# Patient Record
Sex: MALE | Race: WHITE | NOT HISPANIC OR LATINO | Employment: FULL TIME | URBAN - METROPOLITAN AREA
[De-identification: names, ages, dates, MRNs, and addresses within clinical notes are randomized per-mention and may not be internally consistent; named-entity substitution may affect disease eponyms.]

---

## 2017-05-02 DIAGNOSIS — N52.9 ERECTILE DYSFUNCTION, UNSPECIFIED ERECTILE DYSFUNCTION TYPE: ICD-10-CM

## 2017-05-02 DIAGNOSIS — E22.0 ACROMEGALY: ICD-10-CM

## 2017-05-02 DIAGNOSIS — E29.1 HYPOGONADISM IN MALE: ICD-10-CM

## 2017-05-02 DIAGNOSIS — R37 SEXUAL DYSFUNCTION: ICD-10-CM

## 2017-05-02 RX ORDER — TESTOSTERONE 20.25 MG/1.25G
GEL TOPICAL
Qty: 150 G | Refills: 5 | Status: SHIPPED | OUTPATIENT
Start: 2017-05-02 | End: 2017-05-25 | Stop reason: SDUPTHER

## 2017-05-02 NOTE — TELEPHONE ENCOUNTER
----- Message from Martha Paiz sent at 5/2/2017  1:00 PM CDT -----  Contact: pt   States he is calling to get a refill on androgel 1.062% gel and filled until the time of his appt and can be  reached at [phone]//thanks/dbw    Pt pharm is   [facility] in [place]   On [address]  [place]

## 2017-05-09 ENCOUNTER — OFFICE VISIT (OUTPATIENT)
Dept: ENDOCRINOLOGY | Facility: CLINIC | Age: 47
End: 2017-05-09
Payer: COMMERCIAL

## 2017-05-09 ENCOUNTER — LAB VISIT (OUTPATIENT)
Dept: LAB | Facility: HOSPITAL | Age: 47
End: 2017-05-09
Attending: INTERNAL MEDICINE
Payer: COMMERCIAL

## 2017-05-09 VITALS
DIASTOLIC BLOOD PRESSURE: 75 MMHG | SYSTOLIC BLOOD PRESSURE: 105 MMHG | TEMPERATURE: 98 F | HEART RATE: 64 BPM | BODY MASS INDEX: 26.04 KG/M2 | HEIGHT: 75 IN | WEIGHT: 209.44 LBS

## 2017-05-09 DIAGNOSIS — E22.0 ACROMEGALY: ICD-10-CM

## 2017-05-09 DIAGNOSIS — E29.1 HYPOGONADISM IN MALE: ICD-10-CM

## 2017-05-09 DIAGNOSIS — E23.7 PITUITARY DISORDER: ICD-10-CM

## 2017-05-09 DIAGNOSIS — E29.1 HYPOGONADISM IN MALE: Primary | ICD-10-CM

## 2017-05-09 LAB
ALBUMIN SERPL BCP-MCNC: 3.9 G/DL
ALP SERPL-CCNC: 49 U/L
ALT SERPL W/O P-5'-P-CCNC: 13 U/L
ANION GAP SERPL CALC-SCNC: 8 MMOL/L
AST SERPL-CCNC: 16 U/L
BASOPHILS # BLD AUTO: 0.02 K/UL
BASOPHILS NFR BLD: 0.5 %
BILIRUB SERPL-MCNC: 1.1 MG/DL
BUN SERPL-MCNC: 13 MG/DL
CALCIUM SERPL-MCNC: 9.5 MG/DL
CHLORIDE SERPL-SCNC: 108 MMOL/L
CHOLEST/HDLC SERPL: 3.2 {RATIO}
CO2 SERPL-SCNC: 26 MMOL/L
CORTIS SERPL-MCNC: 6.2 UG/DL
CREAT SERPL-MCNC: 0.9 MG/DL
DIFFERENTIAL METHOD: ABNORMAL
EOSINOPHIL # BLD AUTO: 0.1 K/UL
EOSINOPHIL NFR BLD: 1.8 %
ERYTHROCYTE [DISTWIDTH] IN BLOOD BY AUTOMATED COUNT: 13.1 %
EST. GFR  (AFRICAN AMERICAN): >60 ML/MIN/1.73 M^2
EST. GFR  (NON AFRICAN AMERICAN): >60 ML/MIN/1.73 M^2
FSH SERPL-ACNC: 3.5 MIU/ML
GLUCOSE SERPL-MCNC: 82 MG/DL
HCT VFR BLD AUTO: 40.1 %
HDL/CHOLESTEROL RATIO: 30.9 %
HDLC SERPL-MCNC: 139 MG/DL
HDLC SERPL-MCNC: 43 MG/DL
HGB BLD-MCNC: 13.8 G/DL
LDLC SERPL CALC-MCNC: 80.2 MG/DL
LH SERPL-ACNC: 2.2 MIU/ML
LYMPHOCYTES # BLD AUTO: 1.5 K/UL
LYMPHOCYTES NFR BLD: 37.8 %
MCH RBC QN AUTO: 29.9 PG
MCHC RBC AUTO-ENTMCNC: 34.4 %
MCV RBC AUTO: 87 FL
MONOCYTES # BLD AUTO: 0.3 K/UL
MONOCYTES NFR BLD: 6.9 %
NEUTROPHILS # BLD AUTO: 2.1 K/UL
NEUTROPHILS NFR BLD: 53 %
NONHDLC SERPL-MCNC: 96 MG/DL
PLATELET # BLD AUTO: 171 K/UL
PMV BLD AUTO: 9 FL
POTASSIUM SERPL-SCNC: 4.3 MMOL/L
PROLACTIN SERPL IA-MCNC: 15 NG/ML
PROT SERPL-MCNC: 7.5 G/DL
RBC # BLD AUTO: 4.61 M/UL
SODIUM SERPL-SCNC: 142 MMOL/L
T3FREE SERPL-MCNC: 2.7 PG/ML
T4 FREE SERPL-MCNC: 1.08 NG/DL
TESTOST SERPL-MCNC: 393 NG/DL
TRIGL SERPL-MCNC: 79 MG/DL
TSH SERPL DL<=0.005 MIU/L-ACNC: 1.08 UIU/ML
WBC # BLD AUTO: 3.92 K/UL

## 2017-05-09 PROCEDURE — 84305 ASSAY OF SOMATOMEDIN: CPT

## 2017-05-09 PROCEDURE — 84481 FREE ASSAY (FT-3): CPT

## 2017-05-09 PROCEDURE — 99999 PR PBB SHADOW E&M-EST. PATIENT-LVL III: CPT | Mod: PBBFAC,,, | Performed by: INTERNAL MEDICINE

## 2017-05-09 PROCEDURE — 82533 TOTAL CORTISOL: CPT

## 2017-05-09 PROCEDURE — 82024 ASSAY OF ACTH: CPT

## 2017-05-09 PROCEDURE — 80061 LIPID PANEL: CPT

## 2017-05-09 PROCEDURE — 84439 ASSAY OF FREE THYROXINE: CPT

## 2017-05-09 PROCEDURE — 1160F RVW MEDS BY RX/DR IN RCRD: CPT | Mod: S$GLB,,, | Performed by: INTERNAL MEDICINE

## 2017-05-09 PROCEDURE — 83001 ASSAY OF GONADOTROPIN (FSH): CPT

## 2017-05-09 PROCEDURE — 84403 ASSAY OF TOTAL TESTOSTERONE: CPT

## 2017-05-09 PROCEDURE — 84402 ASSAY OF FREE TESTOSTERONE: CPT

## 2017-05-09 PROCEDURE — 83002 ASSAY OF GONADOTROPIN (LH): CPT

## 2017-05-09 PROCEDURE — 84443 ASSAY THYROID STIM HORMONE: CPT

## 2017-05-09 PROCEDURE — 99204 OFFICE O/P NEW MOD 45 MIN: CPT | Mod: S$GLB,,, | Performed by: INTERNAL MEDICINE

## 2017-05-09 PROCEDURE — 80053 COMPREHEN METABOLIC PANEL: CPT

## 2017-05-09 PROCEDURE — 85025 COMPLETE CBC W/AUTO DIFF WBC: CPT

## 2017-05-09 PROCEDURE — 83003 ASSAY GROWTH HORMONE (HGH): CPT

## 2017-05-09 PROCEDURE — 84146 ASSAY OF PROLACTIN: CPT

## 2017-05-09 NOTE — PROGRESS NOTES
"Subjective:       Patient ID: Stephon Lynne is a 46 y.o. male.    Chief Complaint: Low Testosterone    HPI     Here to establish care with endocrinologist; here with wife who is former ER nurse  Hx of low testosterone and acromegaly  Dx: 2002 by Dr Mackey and wife states diagnosed based on physical features and IGF-1/Growth hormones were always normal  Had one MRI at time of diagnosis hat showed "Space around pituitary on MRI and possibly tumor?"    Unable to keep follow up care due lack of insurance    Has low testosterone but states androgel pump was not helping fatigue even with 4 pumps- takes appropriately-has been out for several weeks    Wife notices depressed mood    Sister has cushings  Patient had Seizures in childhood-9yo- took dilantin 2 yrs    Hit forehead on forklift blade as an adult-no LOC otherwise no head trauma    Ring size now 14 since 2000, was 12 incollege    Hat size increased , pants size increased gradually they say at hip not waste        Reports change of facial features gradually since childhood and young adulthood      Former binge drinker per wife, he quit 3 months ago    Quit tobacco in 2000      Eye surgery for strabismus in childhood    Seen by u genetics in childhood    Had echo 2001-nl per wife    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 Negative for appetite change, fever and unexpected weight change.   HENT: Positive for hearing loss. Negative for sore throat, trouble swallowing and voice change.         Large tongue   Eyes: Positive for visual disturbance.        Trouble with far-sight over 2 years since exam, no diplopia, uses reading glasses   Respiratory: Negative for shortness of breath and wheezing.    Cardiovascular: Negative for chest pain, palpitations and leg swelling.   Gastrointestinal: Negative for abdominal " pain, blood in stool, diarrhea, nausea and vomiting.   Endocrine: Negative for cold intolerance, heat intolerance, polydipsia, polyphagia and polyuria.   Genitourinary: Negative for difficulty urinating and dysuria.   Musculoskeletal: Negative for arthralgias, gait problem and joint swelling.   Skin: Negative for color change and rash.        Small patch of dry skin mid chest he says from heat   Neurological: Positive for headaches. Negative for dizziness, tremors, seizures, weakness and numbness.        Headaches only twice a year   Hematological: Negative for adenopathy. Does not bruise/bleed easily.   Psychiatric/Behavioral: Positive for dysphoric mood and sleep disturbance. Negative for confusion. The patient is not nervous/anxious.         Has sleep apnea       Objective:      Physical Exam   Constitutional: He is oriented to person, place, and time. He appears well-developed and well-nourished. No distress.   Low baritone voice quality   HENT:   Head: Normocephalic and atraumatic.   Right Ear: External ear normal.   Left Ear: External ear normal.   Mouth/Throat: Oropharynx is clear and moist. No oropharyngeal exudate.   Teeth are crowded together  With some spacing,  Prominent jaw  Macroglossia present   Eyes: Conjunctivae and EOM are normal. Pupils are equal, round, and reactive to light. No scleral icterus.   Neck: No JVD present. No tracheal deviation present. No thyromegaly present.   Cardiovascular: Normal rate, regular rhythm, normal heart sounds and intact distal pulses.  Exam reveals no gallop and no friction rub.    No murmur heard.  Pulmonary/Chest: Effort normal and breath sounds normal. No respiratory distress. He has no wheezes. He has no rales.   Abdominal: Soft. Bowel sounds are normal. He exhibits no distension and no mass. There is no tenderness. There is no rebound and no guarding. No hernia.   Musculoskeletal: He exhibits deformity. He exhibits no edema.   Chest with pes excavatum    Large  hands and feet   Lymphadenopathy:     He has no cervical adenopathy.   Neurological: He is alert and oriented to person, place, and time. He has normal reflexes. No cranial nerve deficit.   Skin: Skin is warm and dry. Rash noted. He is not diaphoretic. No erythema.   Small patch of paular erythema mid chest    Normal body hair distribution   Psychiatric: He has a normal mood and affect. His behavior is normal. Thought content normal.   Vitals reviewed.        Lab Review:   Lab Results   Component Value Date    CHOL 126 09/01/2015    TRIG 70 09/01/2015    HDL 51 09/01/2015     Results for DAVID DÍAZ (MRN [id number]) as of 5/9/2017 09:58   Ref. Range 10/6/2016 09:00   WBC Latest Ref Range: 3.90 - 12.70 K/uL 7.51   RBC Latest Ref Range: 4.60 - 6.20 M/uL 4.67   Hemoglobin Latest Ref Range: 14.0 - 18.0 g/dL 13.4 (L)   Hematocrit Latest Ref Range: 40.0 - 54.0 % 40.1   MCV Latest Ref Range: 82 - 98 fL 86   MCH Latest Ref Range: 27.0 - 31.0 pg 28.7   MCHC Latest Ref Range: 32.0 - 36.0 % 33.4   RDW Latest Ref Range: 11.5 - 14.5 % 12.5   Platelets Latest Ref Range: 150 - 350 K/uL 174   MPV Latest Ref Range: 9.2 - 12.9 fL 9.9   Gran% Latest Ref Range: 38.0 - 73.0 % 62.4   Gran # Latest Ref Range: 1.8 - 7.7 K/uL 4.7   Lymph% Latest Ref Range: 18.0 - 48.0 % 25.8   Lymph # Latest Ref Range: 1.0 - 4.8 K/uL 1.9   Mono% Latest Ref Range: 4.0 - 15.0 % 11.2   Mono # Latest Ref Range: 0.3 - 1.0 K/uL 0.8   Eosinophil% Latest Ref Range: 0.0 - 8.0 % 0.4   Eos # Latest Ref Range: 0.0 - 0.5 K/uL 0.0   Basophil% Latest Ref Range: 0.0 - 1.9 % 0.1   Baso # Latest Ref Range: 0.00 - 0.20 K/uL 0.01   Sodium Latest Ref Range: 136 - 145 mmol/L 139   Potassium Latest Ref Range: 3.5 - 5.1 mmol/L 3.8   Chloride Latest Ref Range: 95 - 110 mmol/L 106   CO2 Latest Ref Range: 23 - 29 mmol/L 26   Anion Gap Latest Ref Range: 8 - 16 mmol/L 7 (L)   BUN, Bld Latest Ref Range: 6 - 20 mg/dL 10   Creatinine Latest Ref Range: 0.5 - 1.4 mg/dL 0.9   eGFR if  non  Latest Ref Range: >60 mL/min/1.73 m^2 >60.0   eGFR if African American Latest Ref Range: >60 mL/min/1.73 m^2 >60.0   Glucose Latest Ref Range: 70 - 110 mg/dL 97   Calcium Latest Ref Range: 8.7 - 10.5 mg/dL 9.1   Alkaline Phosphatase Latest Ref Range: 55 - 135 U/L 56   Total Protein Latest Ref Range: 6.0 - 8.4 g/dL 7.2   Albumin Latest Ref Range: 3.5 - 5.2 g/dL 3.8   Total Bilirubin Latest Ref Range: 0.1 - 1.0 mg/dL 2.5 (H)   AST Latest Ref Range: 10 - 40 U/L 17   ALT Latest Ref Range: 10 - 44 U/L 17   TSH Latest Ref Range: 0.400 - 4.000 uIU/mL 1.480   T3, Free Latest Ref Range: 2.3 - 4.2 pg/mL 2.3   Free T4 Latest Ref Range: 0.71 - 1.51 ng/dL 1.09   PSA, SCREEN Latest Ref Range: 0.00 - 4.00 ng/mL 0.14   Prolactin Latest Ref Range: 3.5 - 19.4 ng/mL 11.1   Testosterone Latest Ref Range: 250 - 1100 ng/dL 137 (L)   Testosterone, Free Latest Ref Range: 46.0 - 224.0 pg/mL 19.8 (L)   Testosterone, Total Latest Ref Range: 195.0 - 1138.0 ng/dL 142 (L)   Testosterone Testosterone Latest Ref Range: 110.0 - 575.0 ng/dL 40.8 (L)   Sex Hormone Binding Globulin Latest Ref Range: 10 - 50 nmol/L 26   Albumin Latest Ref Range: 3.6 - 5.1 g/dL 4.5   Differential Method Unknown Automated   Somatomedin (IGF-I) Latest Ref Range: 101 - 267 ng/mL 259       Assessment:     1. Hypogonadism in male  Lipid panel    Prolactin    T3, free    T4, free    TSH    Testosterone, free    Insulin-like growth factor    Growth hormone    Comprehensive metabolic panel    CBC auto differential    Follicle stimulating hormone    Cortisol, 8AM    ACTH    MRI Brain W WO Contrast    Luteinizing hormone    Testosterone   2. Acromegaly  Lipid panel    Prolactin    T3, free    T4, free    TSH    Testosterone, free    Insulin-like growth factor    Growth hormone    Comprehensive metabolic panel    CBC auto differential    Follicle stimulating hormone    Cortisol, 8AM    ACTH    MRI Brain W WO Contrast    Luteinizing hormone    Testosterone   3.  Pituitary disorder  MRI Brain W WO Contrast        Patient does have acromegalic features but unusually has normal IGF-1/growth hormone levels. 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 This is rare though has been reported in case review literature.     Will re-evaluate HPA-axis  He would like to try injections of testosterone as topical is too expensive and didn't seem to work  May give first dose in office and since wife is a nurse she would like to administer at home    Discussed pathophysiology, signs and symptoms, complications and management of disease.    Plan:   Hypogonadism in male  -     Lipid panel; Future; Expected date: 5/9/17  -     Prolactin; Future; Expected date: 5/9/17  -     T3, free; Future; Expected date: 5/9/17  -     T4, free; Future; Expected date: 5/9/17  -     TSH; Future; Expected date: 5/9/17  -     Testosterone, free; Future; Expected date: 5/9/17  -     Insulin-like growth factor; Future; Expected date: 5/9/17  -     Growth hormone; Future; Expected date: 5/9/17  -     Comprehensive metabolic panel; Future; Expected date: 5/9/17  -     CBC auto differential; Future; Expected date: 5/9/17  -     Follicle stimulating hormone; Future; Expected date: 5/9/17  -     Cortisol, 8AM; Future; Expected date: 5/9/17  -     ACTH; Future; Expected date: 5/9/17  -     MRI Brain W WO Contrast; Future; Expected date: 5/9/17  -     Luteinizing hormone; Future; Expected date: 5/9/17  -     Testosterone; Future; Expected date: 5/9/17    Acromegaly  -     Lipid panel; Future; Expected date: 5/9/17  -     Prolactin; Future; Expected date: 5/9/17  -     T3, free; Future; Expected date: 5/9/17  -     T4, free; Future; Expected date: 5/9/17  -     TSH; Future; Expected date: 5/9/17  -     Testosterone, free; Future; Expected date: 5/9/17  -     Insulin-like growth factor; Future; Expected date:  5/9/17  -     Growth hormone; Future; Expected date: 5/9/17  -     Comprehensive metabolic panel; Future; Expected date: 5/9/17  -     CBC auto differential; Future; Expected date: 5/9/17  -     Follicle stimulating hormone; Future; Expected date: 5/9/17  -     Cortisol, 8AM; Future; Expected date: 5/9/17  -     ACTH; Future; Expected date: 5/9/17  -     MRI Brain W WO Contrast; Future; Expected date: 5/9/17  -     Luteinizing hormone; Future; Expected date: 5/9/17  -     Testosterone; Future; Expected date: 5/9/17    Pituitary disorder  -     MRI Brain W WO Contrast; Future; Expected date: 5/9/17      Return in about 6 weeks (around 6/20/2017).      This note is unavailable for viewing online. Certain specialties, including Behavioral Health and Pain Management, are currently not included in the open progress note program. For notes unavailable online, you can request a copy of your medical record.

## 2017-05-09 NOTE — MR AVS SNAPSHOT
[facility]  [address]  [address]  [place] [address]  Phone: [phone]  Fax: [phone]                  Stephon Lynne   2017 8:00 AM   Office Visit    Description:  Male : 1970   Provider:  Nory Bacon MD   Department:  [facility]           Reason for Visit     Low Testosterone           Diagnoses this Visit        Comments    Hypogonadism in male    -  Primary     Acromegaly         Pituitary disorder                To Do List           Future Appointments        Provider Department Dept Phone    2017 11:55 AM LABORATORY, [facility] [phone]    2017 4:00 PM [facility] MRI1 [facility] -  [phone]    2017 9:30 AM Nory Bacon MD [facility] [phone]    10/6/2017 8:20 AM Ailyn Child MD [facility] [phone]      Goals (5 Years of Data)     None      Follow-Up and Disposition     Return in about 6 years (around 2023).      [facility]     [facility] Nurse Care Line -  Assistance  Unless otherwise directed by your provider, please contact [facility], our nurse care line that is available for  assistance.     Registered nurses in the [facility] provide: appointment scheduling, clinical advisement, health education, and other advisory services.  Call: [phone] (toll free)               Medications           Message regarding Medications     Verify the changes and/or additions to your medication regime listed below are the same as discussed with your clinician today.  If any of these changes or additions are incorrect, please notify your healthcare provider.             Verify that the below list of medications is an accurate representation of the medications you are currently taking.  If none reported, the list may be blank. If incorrect, please contact your healthcare provider. Carry this list with you in case of  "emergency.           Current Medications     testosterone (ANDROGEL) 20.25 mg/1.25 gram (1.62 %) GlPm APPLY 4 PUMPS TO SKIN DAILY AS DIRECTED           Clinical Reference Information           Your Vitals Were     BP Pulse Temp Height Weight BMI    105/75 64 97.6 °F (36.4 °C) 6' 3" (1.905 m) 95 kg (209 lb 7 oz) 26.18 kg/m2      Blood Pressure          Most Recent Value    BP  105/75      Allergies as of 5/9/2017     No Known Allergies      Immunizations Administered on Date of Encounter - 5/9/2017     None      Orders Placed During Today's Visit     Future Labs/Procedures Expected by Expires    ACTH  5/9/2017 7/8/2018    CBC auto differential  5/9/2017 7/8/2018    Comprehensive metabolic panel  5/9/2017 7/8/2018    Cortisol, 8AM  5/9/2017 7/8/2018    Follicle stimulating hormone  5/9/2017 7/8/2018    Growth hormone  5/9/2017 7/8/2018    Insulin-like growth factor  5/9/2017 7/8/2018    Lipid panel  5/9/2017 7/8/2018    Luteinizing hormone  5/9/2017 7/8/2018    MRI Brain W WO Contrast  5/9/2017 5/9/2018    Prolactin  5/9/2017 7/8/2018    T3, free  5/9/2017 7/8/2018    T4, free  5/9/2017 7/8/2018    Testosterone, free  5/9/2017 7/8/2018    Testosterone  5/9/2017 7/8/2018    TSH  5/9/2017 7/8/2018      Language Assistance Services     ATTENTION: Language assistance services are available, free of charge. Please call [phone].      ATENCIÓN: Si habla español, tiene a lora disposición servicios gratuitos de asistencia lingüística. Llame al [phone].     CHÚ Ý: N?u b?n nói Ti?ng Vi?t, có các d?ch v? h? tr? ngôn ng? mi?n phí dành cho b?n. G?i s? [phone].         [facility] complies with applicable Federal civil rights laws and does not discriminate on the basis of race, color, national origin, age, disability, or sex.        "

## 2017-05-12 LAB
ACTH PLAS-MCNC: 23 PG/ML
GH SERPL-MCNC: 0.1 NG/ML
TESTOST FREE SERPL-MCNC: 8.6 PG/ML

## 2017-05-18 LAB
IGF-I SERPL-MCNC: 144 NG/ML (ref 44–275)
IGF-I Z-SCORE SERPL: 0.25 SD

## 2017-05-25 DIAGNOSIS — N52.9 ERECTILE DYSFUNCTION, UNSPECIFIED ERECTILE DYSFUNCTION TYPE: ICD-10-CM

## 2017-05-25 DIAGNOSIS — E22.0 ACROMEGALY: ICD-10-CM

## 2017-05-25 DIAGNOSIS — R37 SEXUAL DYSFUNCTION: ICD-10-CM

## 2017-05-25 DIAGNOSIS — E29.1 HYPOGONADISM IN MALE: ICD-10-CM

## 2017-05-29 ENCOUNTER — PATIENT MESSAGE (OUTPATIENT)
Dept: ENDOCRINOLOGY | Facility: CLINIC | Age: 47
End: 2017-05-29

## 2017-05-29 RX ORDER — TESTOSTERONE 20.25 MG/1.25G
GEL TOPICAL
Qty: 150 G | Refills: 5 | Status: SHIPPED | OUTPATIENT
Start: 2017-05-29 | End: 2018-02-13 | Stop reason: SDUPTHER

## 2017-06-07 ENCOUNTER — PATIENT MESSAGE (OUTPATIENT)
Dept: ENDOCRINOLOGY | Facility: CLINIC | Age: 47
End: 2017-06-07

## 2017-06-08 ENCOUNTER — TELEPHONE (OUTPATIENT)
Dept: ADMINISTRATIVE | Facility: HOSPITAL | Age: 47
End: 2017-06-08

## 2017-06-08 NOTE — TELEPHONE ENCOUNTER
Informed patients wife we can start the testosterone injections once I am able to give it in the department. . So we will call him when we are able to start.

## 2017-06-08 NOTE — TELEPHONE ENCOUNTER
This message is being sent by Courtney Lynne on behalf of Brandon Bacon,   I am sending a message for my  Brandon Lynne whom I have proxy access for. I know he emailed you for a refill on his Androgel however he forgot about the cost associated with the gel ($500 with ins). Are we able to do the injections or a cheaper route?     Thank you,   Courtney Lynne

## 2017-06-15 NOTE — TELEPHONE ENCOUNTER
PATIENT CALLED REQUESTING INFORMATION ON TESTOSTERONE INJECTIONS. HE WAS CONFUSED AS TO WHY HE COULD NOT RECEIVE THE INJECTION AT THIS TIME AT [facility],. I EXPLAINED HE CAN, WE HAVE HAD TO ORDER CERTAIN MEDICATONS SINCE DR STORY CAME ON BOARD AND WE ARE AWAITING THE ARRIVAL OF MEDICATIONS AT THIS TIME. HE VOICED UNDERSTANDING AND WAS VERY APPRECIATIVE.

## 2017-08-21 ENCOUNTER — TELEPHONE (OUTPATIENT)
Dept: ENDOCRINOLOGY | Facility: CLINIC | Age: 47
End: 2017-08-21

## 2017-08-21 NOTE — TELEPHONE ENCOUNTER
Per Camelia Deras, attempted to advise pt that once testosterone was received, prescribed treatment would begin.

## 2017-08-21 NOTE — TELEPHONE ENCOUNTER
----- Message from Camelia Deras LPN sent at 8/18/2017  8:42 AM CDT -----  Sereathia, Can you please notify this patient once the depo-testosterone has been ordered and received in clinic so he may start his ordered therapy?  Thank you -Camelia  ----- Message -----  From: Kristi Franz LPN  Sent: 6/8/2017   3:50 PM  To: JULIANA Rosales,    See below..... Kristi  ----- Message -----  From: Nory Bacon MD  Sent: 6/8/2017   3:10 PM  To: Kristi Franz LPN    In response to the testosterone please let patient or his wife know we can start the testosterone injections once I am able to give it in the department. Joy Deras is working on getting this done, there needs to be a lock box, etc. So we will call him when we are able to start. Thanks

## 2018-02-06 PROBLEM — E34.9 TESTOSTERONE DEFICIENCY: Status: ACTIVE | Noted: 2018-02-06

## 2018-12-01 ENCOUNTER — OFFICE VISIT (OUTPATIENT)
Dept: URGENT CARE | Facility: CLINIC | Age: 48
End: 2018-12-01
Payer: COMMERCIAL

## 2018-12-01 VITALS
HEART RATE: 79 BPM | TEMPERATURE: 98 F | WEIGHT: 209.88 LBS | OXYGEN SATURATION: 98 % | HEIGHT: 75 IN | BODY MASS INDEX: 26.1 KG/M2 | SYSTOLIC BLOOD PRESSURE: 132 MMHG | RESPIRATION RATE: 20 BRPM | DIASTOLIC BLOOD PRESSURE: 86 MMHG

## 2018-12-01 DIAGNOSIS — J01.40 ACUTE NON-RECURRENT PANSINUSITIS: Primary | ICD-10-CM

## 2018-12-01 PROCEDURE — 99214 OFFICE O/P EST MOD 30 MIN: CPT | Mod: S$GLB,,, | Performed by: PHYSICIAN ASSISTANT

## 2018-12-01 PROCEDURE — 99999 PR PBB SHADOW E&M-EST. PATIENT-LVL III: CPT | Mod: PBBFAC,,, | Performed by: PHYSICIAN ASSISTANT

## 2018-12-01 RX ORDER — AMOXICILLIN AND CLAVULANATE POTASSIUM 875; 125 MG/1; MG/1
1 TABLET, FILM COATED ORAL 2 TIMES DAILY
Qty: 14 TABLET | Refills: 0 | Status: SHIPPED | OUTPATIENT
Start: 2018-12-01 | End: 2018-12-08

## 2018-12-01 NOTE — PATIENT INSTRUCTIONS
Sinusitis (Antibiotic Treatment)    The sinuses are air-filled spaces within the bones of the face. They connect to the inside of the nose. Sinusitis is an inflammation of the tissue lining the sinus cavity. Sinus inflammation can occur during a cold. It can also be due to allergies to pollens and other particles in the air. Sinusitis can cause symptoms of sinus congestion and fullness. A sinus infection causes fever, headache and facial pain. There is often green or yellow drainage from the nose or into the back of the throat (post-nasal drip). You have been given antibiotics to treat this condition.  Home care:  · Take the full course of antibiotics as instructed. Do not stop taking them, even if you feel better.  · Drink plenty of water, hot tea, and other liquids. This may help thin mucus. It also may promote sinus drainage.  · Heat may help soothe painful areas of the face. Use a towel soaked in hot water. Or,  the shower and direct the hot spray onto your face. Using a vaporizer along with a menthol rub at night may also help.   · An expectorant containing guaifenesin may help thin the mucus and promote drainage from the sinuses.  · Over-the-counter decongestants may be used unless a similar medicine was prescribed. Nasal sprays work the fastest. Use one that contains phenylephrine or oxymetazoline. First blow the nose gently. Then use the spray. Do not use these medicines more often than directed on the label or symptoms may get worse. You may also use tablets containing pseudoephedrine. Avoid products that combine ingredients, because side effects may be increased. Read labels. You can also ask the pharmacist for help. (NOTE: Persons with high blood pressure should not use decongestants. They can raise blood pressure.)  · Over-the-counter antihistamines may help if allergies contributed to your sinusitis.    · Do not use nasal rinses or irrigation during an acute sinus infection, unless told to by  your health care provider. Rinsing may spread the infection to other sinuses.  · Use acetaminophen or ibuprofen to control pain, unless another pain medicine was prescribed. (If you have chronic liver or kidney disease or ever had a stomach ulcer, talk with your doctor before using these medicines. Aspirin should never be used in anyone under 18 years of age who is ill with a fever. It may cause severe liver damage.)  · Don't smoke. This can worsen symptoms.  Follow-up care  Follow up with your healthcare provider or our staff if you are not improving within the next week.  When to seek medical advice  Call your healthcare provider if any of these occur:  · Facial pain or headache becoming more severe  · Stiff neck  · Unusual drowsiness or confusion  · Swelling of the forehead or eyelids  · Vision problems, including blurred or double vision  · Fever of 100.4ºF (38ºC) or higher, or as directed by your healthcare provider  · Seizure  · Breathing problems  · Symptoms not resolving within 10 days  Date Last Reviewed: 4/13/2015  © 6512-7918 [employer]. [address] [place] [address]. All rights reserved. This information is not intended as a substitute for professional medical care. Always follow your healthcare professional's instructions.      Take all antibiotics even if you feel better before completing the entire regimen.   -Use normal saline nasal spray during the day every 3 hours for sinus irrigation and congestion.    -Avoid exposure to cigarette smoke.    -Practice good handwashing.  -Use warm compresses to sinuses for relief several times a day  -Increase fluid intake to at least 64 ounces of water per day to keep secretions thin and loose  -Use a humidifier in home to help relieve congestion or steam inhalation three times a day for 20-30 minutes.  -Sleep with head of bed elevated   -Take tylenol or ibuprofen as needed for sinus headache.    -Use warm salt water gargles for throat  discomfort.  -Avoid caffeine and alcohol    Follow up with PCP in 1 week if no improvement or sooner if worsening.    Go to ER if you develop fever of 103 or higher, chest pain, shortness of breath, upper back pain, stiff neck or severe headache.

## 2018-12-01 NOTE — PROGRESS NOTES
"Subjective:       Patient ID: Stephon Lynne is a 48 y.o. male.    Chief Complaint: Sinusitis    Sinus Problem   This is a new problem. The current episode started 1 to 4 weeks ago (had improved a few days ago and then sudden worsening of headache). The problem has been rapidly worsening since onset. Maximum temperature: earlier in the week low grade 100. The pain is severe. Associated symptoms include congestion (thick green and yellow discharge), headaches and sinus pressure. Pertinent negatives include no chills, coughing (not consistent), ear pain, shortness of breath, sneezing or sore throat. Treatments tried: nyquil for a week  The treatment provided no relief.     Review of Systems   Constitutional: Negative for chills, fatigue and fever.   HENT: Positive for congestion (thick green and yellow discharge) and sinus pressure. Negative for ear discharge, ear pain, postnasal drip, rhinorrhea, sneezing and sore throat.    Eyes: Negative for pain and discharge.   Respiratory: Negative for cough (not consistent), shortness of breath and wheezing.    Cardiovascular: Negative for chest pain and leg swelling.   Gastrointestinal: Negative for abdominal pain, nausea and vomiting.   Musculoskeletal: Negative for myalgias.   Skin: Negative for rash.   Neurological: Positive for headaches.       Objective:      /86   Pulse 79   Temp 98.2 °F (36.8 °C)   Resp 20   Ht 6' 3" (1.905 m)   Wt 95.2 kg (209 lb 14.1 oz)   SpO2 98%   BMI 26.23 kg/m²   Physical Exam   Constitutional: He is oriented to person, place, and time. He appears well-developed and well-nourished. No distress.   HENT:   Head: Normocephalic and atraumatic.   Right Ear: Tympanic membrane, external ear and ear canal normal.   Left Ear: Tympanic membrane, external ear and ear canal normal.   Nose: Nose normal. Right sinus exhibits no maxillary sinus tenderness and no frontal sinus tenderness. Left sinus exhibits no maxillary sinus tenderness and no " frontal sinus tenderness.   Mouth/Throat: Oropharynx is clear and moist. No oropharyngeal exudate or posterior oropharyngeal erythema. No tonsillar exudate.   Eyes: Conjunctivae and EOM are normal. Pupils are equal, round, and reactive to light.   Neck: Normal range of motion. Neck supple.   Cardiovascular: Normal rate, regular rhythm, normal heart sounds and intact distal pulses. Exam reveals no gallop and no friction rub.   No murmur heard.  Pulmonary/Chest: Effort normal and breath sounds normal. No stridor. No respiratory distress. He has no wheezes. He has no rales. He exhibits no tenderness.   Lymphadenopathy:     He has no cervical adenopathy.   Neurological: He is alert and oriented to person, place, and time.   Skin: Skin is warm and dry. No rash noted. He is not diaphoretic.   Nursing note and vitals reviewed.      Assessment:       1. Acute non-recurrent pansinusitis        Plan:       Acute non-recurrent pansinusitis  -     amoxicillin-clavulanate 875-125mg (AUGMENTIN) 875-125 mg per tablet; Take 1 tablet by mouth 2 (two) times daily. for 7 days  Dispense: 14 tablet; Refill: 0          Take all antibiotics even if you feel better before completing the entire regimen.   -Use normal saline nasal spray during the day every 3 hours for sinus irrigation and congestion.    -Avoid exposure to cigarette smoke.    -Practice good handwashing.  -Use warm compresses to sinuses for relief several times a day  -Increase fluid intake to at least 64 ounces of water per day to keep secretions thin and loose  -Use a humidifier in home to help relieve congestion or steam inhalation three times a day for 20-30 minutes.  -Sleep with head of bed elevated   -Take tylenol or ibuprofen as needed for sinus headache.    -Use warm salt water gargles for throat discomfort.  -Avoid caffeine and alcohol    Follow up with PCP in 1 week if no improvement or sooner if worsening.    Go to ER if you develop fever of 103 or higher, chest  pain, shortness of breath, upper back pain, stiff neck or severe headache.      Heather Trant PA-C [facility]

## 2019-01-30 ENCOUNTER — TELEPHONE (OUTPATIENT)
Dept: NEUROLOGY | Facility: CLINIC | Age: 49
End: 2019-01-30

## 2019-01-30 NOTE — TELEPHONE ENCOUNTER
----- Message from Nichelle Daley sent at 1/30/2019  1:34 PM CST -----  Contact: wife  Wife - Courtney Lynne - [phone] MRN [id number] is a patient of Dr Paiz's/patient is being referred to a Neurologist by his eye doctor - Dr Naveen Jane - [facility]/Dr Jane is wanting patient checked for a possible Pituitary tumor and feels patient is needing an MRI/can Dr Paiz check patient for this per wife?/please advise

## 2019-01-30 NOTE — TELEPHONE ENCOUNTER
Called wife in regards to patient and scheduling new patient appointment. Appointment successfully scheduled. Patient informed to arrive 15 minutes early , and informed of location of clinic , patient verbalized understanding.

## 2019-02-15 ENCOUNTER — OFFICE VISIT (OUTPATIENT)
Dept: FAMILY MEDICINE | Facility: CLINIC | Age: 49
End: 2019-02-15
Payer: COMMERCIAL

## 2019-02-15 VITALS
HEART RATE: 107 BPM | OXYGEN SATURATION: 96 % | WEIGHT: 213.06 LBS | DIASTOLIC BLOOD PRESSURE: 60 MMHG | BODY MASS INDEX: 27.34 KG/M2 | TEMPERATURE: 99 F | HEIGHT: 74 IN | SYSTOLIC BLOOD PRESSURE: 100 MMHG

## 2019-02-15 DIAGNOSIS — R68.89 FLU-LIKE SYMPTOMS: Primary | ICD-10-CM

## 2019-02-15 LAB
CTP QC/QA: YES
FLUAV AG NPH QL: NEGATIVE
FLUBV AG NPH QL: NEGATIVE

## 2019-02-15 PROCEDURE — 99999 PR PBB SHADOW E&M-EST. PATIENT-LVL III: CPT | Mod: PBBFAC,,, | Performed by: FAMILY MEDICINE

## 2019-02-15 PROCEDURE — 99213 OFFICE O/P EST LOW 20 MIN: CPT | Mod: 25,S$GLB,, | Performed by: FAMILY MEDICINE

## 2019-02-15 PROCEDURE — 87804 INFLUENZA ASSAY W/OPTIC: CPT | Mod: QW,S$GLB,, | Performed by: FAMILY MEDICINE

## 2019-02-15 PROCEDURE — 99213 PR OFFICE/OUTPT VISIT, EST, LEVL III, 20-29 MIN: ICD-10-PCS | Mod: 25,S$GLB,, | Performed by: FAMILY MEDICINE

## 2019-02-15 PROCEDURE — 99999 PR PBB SHADOW E&M-EST. PATIENT-LVL III: ICD-10-PCS | Mod: PBBFAC,,, | Performed by: FAMILY MEDICINE

## 2019-02-15 PROCEDURE — 96372 THER/PROPH/DIAG INJ SC/IM: CPT | Mod: 59,S$GLB,, | Performed by: FAMILY MEDICINE

## 2019-02-15 PROCEDURE — 96372 PR INJECTION,THERAP/PROPH/DIAG2ST, IM OR SUBCUT: ICD-10-PCS | Mod: 59,S$GLB,, | Performed by: FAMILY MEDICINE

## 2019-02-15 PROCEDURE — 87804 POCT INFLUENZA A/B: ICD-10-PCS | Mod: QW,S$GLB,, | Performed by: FAMILY MEDICINE

## 2019-02-15 RX ORDER — ONDANSETRON 4 MG/1
4 TABLET, ORALLY DISINTEGRATING ORAL EVERY 6 HOURS PRN
Qty: 20 TABLET | Refills: 0 | Status: SHIPPED | OUTPATIENT
Start: 2019-02-15

## 2019-02-15 RX ORDER — BETAMETHASONE SODIUM PHOSPHATE AND BETAMETHASONE ACETATE 3; 3 MG/ML; MG/ML
6 INJECTION, SUSPENSION INTRA-ARTICULAR; INTRALESIONAL; INTRAMUSCULAR; SOFT TISSUE
Status: COMPLETED | OUTPATIENT
Start: 2019-02-15 | End: 2019-02-15

## 2019-02-15 RX ORDER — PROMETHAZINE HYDROCHLORIDE AND DEXTROMETHORPHAN HYDROBROMIDE 6.25; 15 MG/5ML; MG/5ML
5 SYRUP ORAL 3 TIMES DAILY PRN
Qty: 118 ML | Refills: 0 | Status: SHIPPED | OUTPATIENT
Start: 2019-02-15 | End: 2019-02-25

## 2019-02-15 RX ADMIN — BETAMETHASONE SODIUM PHOSPHATE AND BETAMETHASONE ACETATE 6 MG: 3; 3 INJECTION, SUSPENSION INTRA-ARTICULAR; INTRALESIONAL; INTRAMUSCULAR; SOFT TISSUE at 08:02

## 2019-02-15 NOTE — LETTER
February 15, 2019      [facility]  [address]  [facility] [address]  Phone: [phone]  Fax: [phone]       Patient: Stephon Lynne   YOB: 1970  Date of Visit: 02/15/2019    To Whom It May Concern:    Luis Miguel Lynne  was at [facility] on 02/15/2019. He may return to work on 2/18/19 with no restrictions. If you have any questions or concerns, or if I can be of further assistance, please do not hesitate to contact me.    Sincerely,    Candi Pacheco MA

## 2019-02-15 NOTE — PROGRESS NOTES
"Subjective:       Patient ID: Stephon Lynne is a 48 y.o. male.    Chief Complaint: Sore Throat; Cough; Fatigue; and Fever      HPI Comments:       Current Outpatient Medications:     ondansetron (ZOFRAN-ODT) 4 MG TbDL, Take 1 tablet (4 mg total) by mouth every 6 (six) hours as needed., Disp: 20 tablet, Rfl: 0    promethazine-dextromethorphan (PROMETHAZINE-DM) 6.25-15 mg/5 mL Syrp, Take 5 mLs by mouth 3 (three) times daily as needed., Disp: 118 mL, Rfl: 0    testosterone (ANDROGEL) 20.25 mg/1.25 gram (1.62 %) GlPm, APPLY 3 PUMPS TO SKIN DAILY AS DIRECTED, Disp: 150 g, Rfl: 5    Current Facility-Administered Medications:     betamethasone acetate-betamethasone sodium phosphate injection 6 mg, 6 mg, Intramuscular, 1 time in Clinic/HOD, Fredi Landis MD      Presents with a 48 hr history of sore throat and cough, body aches and headache, nausea and vomiting, low-grade fever and chills.  Recent exposure to flu.  Nonsmoker.  Taking DayQuil and Robitussin DM which gives temporary relief.  Works in a plant.  White/yellow sputum.      Review of Systems   Constitutional: Positive for activity change, chills and fever. Negative for appetite change.   HENT: Positive for congestion, postnasal drip and rhinorrhea. Negative for sore throat.    Respiratory: Positive for cough. Negative for shortness of breath and wheezing.    Cardiovascular: Negative for chest pain.   Gastrointestinal: Positive for nausea and vomiting. Negative for abdominal pain and diarrhea.   Genitourinary: Negative for difficulty urinating.   Musculoskeletal: Positive for myalgias. Negative for arthralgias.   Neurological: Positive for headaches. Negative for dizziness.       Objective:      Vitals:    02/15/19 0803   BP: 100/60   Pulse: 107   Temp: 98.6 °F (37 °C)   SpO2: 96%   Weight: 96.6 kg (213 lb 1.2 oz)   Height: 6' 2" (1.88 m)     Physical Exam   Constitutional: He is oriented to person, place, and time. He appears well-developed and " well-nourished.  Non-toxic appearance. He appears ill. No distress.   HENT:   Head: Normocephalic.   Right Ear: Tympanic membrane, external ear and ear canal normal.   Left Ear: Tympanic membrane, external ear and ear canal normal.   Nose: Mucosal edema and rhinorrhea present.   Mouth/Throat: Mucous membranes are normal. Posterior oropharyngeal edema present. No oropharyngeal exudate or posterior oropharyngeal erythema.   Neck: Neck supple. No thyromegaly present.   Cardiovascular: Normal rate, regular rhythm and normal heart sounds.   No murmur heard.  Pulmonary/Chest: Effort normal and breath sounds normal. He has no wheezes. He has no rales.   Abdominal: Soft. He exhibits no distension and no mass. There is no hepatosplenomegaly. There is no tenderness.   Musculoskeletal: He exhibits no edema.   Lymphadenopathy:     He has no cervical adenopathy.   Neurological: He is alert and oriented to person, place, and time.   Skin: Skin is warm and dry. He is not diaphoretic.   Psychiatric: He has a normal mood and affect. His behavior is normal. Judgment and thought content normal.   Nursing note and vitals reviewed.      Assessment:       1. Flu-like symptoms        Plan:   Flu-like symptoms  Comments:  Rapid flu negative.  Fluids and rest.  Promethazine DM for nighttime cough.  Zofran for nausea and vomiting.  [facility] IM.  Follow-up precautions given  Orders:  -     POCT Influenza A/B    Other orders  -     ondansetron (ZOFRAN-ODT) 4 MG TbDL; Take 1 tablet (4 mg total) by mouth every 6 (six) hours as needed.  Dispense: 20 tablet; Refill: 0  -     promethazine-dextromethorphan (PROMETHAZINE-DM) 6.25-15 mg/5 mL Syrp; Take 5 mLs by mouth 3 (three) times daily as needed.  Dispense: 118 mL; Refill: 0  -     betamethasone acetate-betamethasone sodium phosphate injection 6 mg

## 2019-04-04 ENCOUNTER — OFFICE VISIT (OUTPATIENT)
Dept: NEUROLOGY | Facility: CLINIC | Age: 49
End: 2019-04-04
Payer: COMMERCIAL

## 2019-04-04 ENCOUNTER — TELEPHONE (OUTPATIENT)
Dept: NEUROLOGY | Facility: CLINIC | Age: 49
End: 2019-04-04

## 2019-04-04 VITALS
SYSTOLIC BLOOD PRESSURE: 114 MMHG | DIASTOLIC BLOOD PRESSURE: 84 MMHG | WEIGHT: 210.63 LBS | BODY MASS INDEX: 27.03 KG/M2 | HEIGHT: 74 IN | RESPIRATION RATE: 16 BRPM | HEART RATE: 60 BPM

## 2019-04-04 DIAGNOSIS — E23.7 PITUITARY DYSFUNCTION: ICD-10-CM

## 2019-04-04 DIAGNOSIS — G44.53 THUNDERCLAP HEADACHE: ICD-10-CM

## 2019-04-04 DIAGNOSIS — G43.719 INTRACTABLE CHRONIC MIGRAINE WITHOUT AURA AND WITHOUT STATUS MIGRAINOSUS: Primary | ICD-10-CM

## 2019-04-04 DIAGNOSIS — E22.0 GIGANTISM AND ACROMEGALY: ICD-10-CM

## 2019-04-04 DIAGNOSIS — E22.0 ACROMEGALY: ICD-10-CM

## 2019-04-04 PROCEDURE — 99999 PR PBB SHADOW E&M-EST. PATIENT-LVL III: CPT | Mod: PBBFAC,,, | Performed by: PSYCHIATRY & NEUROLOGY

## 2019-04-04 PROCEDURE — 99204 OFFICE O/P NEW MOD 45 MIN: CPT | Mod: S$GLB,,, | Performed by: PSYCHIATRY & NEUROLOGY

## 2019-04-04 PROCEDURE — 99204 PR OFFICE/OUTPT VISIT, NEW, LEVL IV, 45-59 MIN: ICD-10-PCS | Mod: S$GLB,,, | Performed by: PSYCHIATRY & NEUROLOGY

## 2019-04-04 PROCEDURE — 99999 PR PBB SHADOW E&M-EST. PATIENT-LVL III: ICD-10-PCS | Mod: PBBFAC,,, | Performed by: PSYCHIATRY & NEUROLOGY

## 2019-04-04 NOTE — PROGRESS NOTES
Headache questionnaire    1. When did your Headaches start?    Occasional       2. Where are your headaches located?   Right temple      3. Your headache's characteristics:   Stabbing      4. How long does the headache last?   hours      5. How often does the headache occur?   occasional       6. Are your headaches preceded or accompanied by other symptoms? yes   If yes, please describe.  Nausea, always associated with headaches      7. Does the headache awaken you at night? yes   If so, how often? Infrequently- maybe 2-3 times per year        8. Please alw the word that best describes your headache's intensity:    moderate      9. Using a scale of 1 through 10, with 0 = no pain and 10 = the worst pain:   What score is your headache now? 0   What score is your headache at its worst? 10   What score is your headache at its best? 1        10. Possible associated headache symptoms:  []  Sensitivity to light  [] Dizziness  [] Nasal or sinus pressure/ pain   [x] Sensitivity to noise  [] Vertigo  [] Problems with concentration  [] Sensitivity to smells  [] Ringing in ears  [x] Problems with memory    [x] Blurred vision  [] Irritability  [] Problems with task completion   [] Double vision  [] Anger  []  Problems with relaxation  [] Loss of appetite  [] Anxiety  [] Neck tightness, Neck pain  [] Nausea   [] Nasal congestion  [] Vomiting         11. Headache improving factors:  [x] Sleep  [] Heat  [] Darkness  [] Ice  [x] Local pressure [] Menses (period)  [] Massage   [x] Medications:        12. Headache worsening factors:   [x] Fatigue [] Sneezing  [] Changes in Weather  [x] Light [x] Bending Over [] Stress  [x] Noise [] Ovulation  [] Multiple Sclerosis Flare-Up  [] Smells  [] Menses  [] Food   [] Coughing [] Alcohol      13. Number of caffeinated drinks per day: 2-3      14. Number of diet drinks per day:  0      15. Have you seen any other [facility] within the last 3 years?  No      Please Check any  Medications Tried for Headache    AED Neuromodulators  MAOIs  Ergot Alkaloids    Acetazolamide (Diamox) [] Phenelzine (Nardil) [] Dihydroergotamine (Migranal) []   Carbamazepine (Tegretol) [] Tranylcypromine (Parnate) [] Ergotamine (Ergomar) []   Gabapentin (Neurontin) [] Antihistamine/Serotonergic  Triptans    Lacosamide (Vimpat) [] Cyproheptadine (Periactin) [] Almotriptan (Axert) []   Lamotrigine (Lamictal) [] Antihypertensives  Eletriptan (Relpax) []   Levatiracetam (Keppra) [] Atenolol (Tenormin) [] Frovatriptan (Frova) []   Oxcarbazepine (Trileptal) [] Bisoprostol (Zebeta) [] Naratriptan (Amerge) []   Phenobarbital [] Candesartan (Atacand) [] Rizatriptan (Maxalt) []   Phenytoin (Dilantin) [] Diltiazem (Cardizem) [] Sumatriptan (Imitrex) []   Pregabalin (Lyrica) [] Lisinopril (Prinivil, Zestril) [] Zolmitriptan (Zomig) []   Primidone (Mysoline) [] Metoprolol (Toprol) [] Combo Abortives    Tiagabine (Gabatril) [] Nadolol (Corgard) [] Butalbital and Acetaminophen (Bupap) []   Topiramate (Topamax)  (Trokendi) [] Nicardipine (Cardene) []     Vigabatrin (Sabril) [] Nimodipine (Nimotop) [] Butalbital, Acetaminophen, and caffeine (Fioricet) []   Valproic Acid (Depakote) (Divalproex Sodium) [] Propranolol (Inderal) []     Zonisamide (Zonegran) [] Telmisartan (Micardis) [] Butalbital, Aspirin, and caffeine (Fiorinal) []   Benzodiazepines  Timolol (Blocadren) []     Alprazolam (Xanax) [] Verapamil (Calan, Verelan) [] Butalbital, Caffeine, Acetaminophen, and Codeine (Fioricet with Codeine) []   Diazepam (Valium) [] NSAIDs      Lorazepam (Ativan) [] Acetaminophen (Tylenol) [x]     Clonazepam (Klonopin) [] Acetylsalicylic Acid (Aspirin) [] Butalbital, Caffeine, Aspirin, and Codeine  (Fiorinal with Codeine) []   Antidepressants  Diclofenac (Cambia) [x]     Amitriptyline (Elavil) [] Ibuprofen (Motrin) [x]     Desipramine (Norpramin) [] Indomethacin (Indocin) [] Aspirin, Caffeine, and Acetaminophen (Excedrin) (Goodys) []    Doxepin (Sinequan) [] Ketoprofen (Orudis) []     Fluoxetine (Prozac) [] Ketorolac (Toradol) [] Acetaminophen, Dichloralphenazone, and Isometheptene (Midrin) []   Imipramine (Tofranil) [] Naproxen (Anaprox) (Aleve) [x]     Nortriptyline (Pamelor) [] Meclofenamic Acid (Meclomen) []     Venlafaxine (Effexor) [] Meloxicam (Mobic) [] Aspirin, Salicylamide, and Caffeine (BC Powder) []

## 2019-04-04 NOTE — PROGRESS NOTES
Subjective:       Patient ID: Stephon Lynne is a 48 y.o. male.    Chief Complaint: Headache    HPI   The patient is a pleasant 47 y/o male presenting with chief complaint of a new onset of scintillating scotoma not followed by headache. He has experienced a total of 3 episodes. He was evaluated by ophthalmology and given the diagnosis of scintillating scotoma. Of interest, the patient has history of [place] Headache. Imaging after headache resolved revealed an abnormality in the area surrounding the pituitary. He has been diagnosed with Acromegaly and Hypogonadism. He was not followed with imaging studies. He also reports anosmia, cannot remember ever being able to smell. 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   Headache questionnaire    1. When did your Headaches start?    Occasional       2. Where are your headaches located?   Right temple      3. Your headache's characteristics:   Stabbing      4. How long does the headache last?   hours      5. How often does the headache occur?   occasional       6. Are your headaches preceded or accompanied by other symptoms? yes   If yes, please describe.  Nausea, always associated with headaches      7. Does the headache awaken you at night? yes   If so, how often? Infrequently- maybe 2-3 times per year        8. Please law the word that best describes your headache's intensity:    moderate      9. Using a scale of 1 through 10, with 0 = no pain and 10 = the worst pain:   What score is your headache now? 0   What score is your headache at its worst? 10   What score is your headache at its best? 1        10. Possible associated headache symptoms:  []  Sensitivity to light  [] Dizziness  [] Nasal or sinus pressure/ pain   [x] Sensitivity to noise  [] Vertigo  [] Problems with concentration  [] Sensitivity to smells  [] Ringing in ears  [x] Problems with memory    [x] Blurred vision  [] Irritability  [] Problems with task completion   [] Double vision  [] Anger  []  Problems  with relaxation  [] Loss of appetite  [] Anxiety  [] Neck tightness, Neck pain  [] Nausea   [] Nasal congestion  [] Vomiting         11. Headache improving factors:  [x] Sleep  [] Heat  [] Darkness  [] Ice  [x] Local pressure [] Menses (period)  [] Massage   [x] Medications:        12. Headache worsening factors:   [x] Fatigue [] Sneezing  [] Changes in Weather  [x] Light [x] Bending Over [] Stress  [x] Noise [] Ovulation  [] Multiple Sclerosis Flare-Up  [] Smells  [] Menses  [] Food   [] Coughing [] Alcohol      13. Number of caffeinated drinks per day: 2-3      14. Number of diet drinks per day:  0      15. Have you seen any other [facility] within the last 3 years?  No      Please Check any Medications Tried for Headache    AED Neuromodulators  MAOIs  Ergot Alkaloids    Acetazolamide (Diamox) [] Phenelzine (Nardil) [] Dihydroergotamine (Migranal) []   Carbamazepine (Tegretol) [] Tranylcypromine (Parnate) [] Ergotamine (Ergomar) []   Gabapentin (Neurontin) [] Antihistamine/Serotonergic  Triptans    Lacosamide (Vimpat) [] Cyproheptadine (Periactin) [] Almotriptan (Axert) []   Lamotrigine (Lamictal) [] Antihypertensives  Eletriptan (Relpax) []   Levatiracetam (Keppra) [] Atenolol (Tenormin) [] Frovatriptan (Frova) []   Oxcarbazepine (Trileptal) [] Bisoprostol (Zebeta) [] Naratriptan (Amerge) []   Phenobarbital [] Candesartan (Atacand) [] Rizatriptan (Maxalt) []   Phenytoin (Dilantin) [] Diltiazem (Cardizem) [] Sumatriptan (Imitrex) []   Pregabalin (Lyrica) [] Lisinopril (Prinivil, Zestril) [] Zolmitriptan (Zomig) []   Primidone (Mysoline) [] Metoprolol (Toprol) [] Combo Abortives    Tiagabine (Gabatril) [] Nadolol (Corgard) [] Butalbital and Acetaminophen (Bupap) []   Topiramate (Topamax)  (Trokendi) [] Nicardipine (Cardene) []     Vigabatrin (Sabril) [] Nimodipine (Nimotop) [] Butalbital, Acetaminophen, and caffeine (Fioricet) []   Valproic Acid (Depakote) (Divalproex Sodium) [] Propranolol (Inderal) []      Zonisamide (Zonegran) [] Telmisartan (Micardis) [] Butalbital, Aspirin, and caffeine (Fiorinal) []   Benzodiazepines  Timolol (Blocadren) []     Alprazolam (Xanax) [] Verapamil (Calan, Verelan) [] Butalbital, Caffeine, Acetaminophen, and Codeine (Fioricet with Codeine) []   Diazepam (Valium) [] NSAIDs      Lorazepam (Ativan) [] Acetaminophen (Tylenol) [x]     Clonazepam (Klonopin) [] Acetylsalicylic Acid (Aspirin) [] Butalbital, Caffeine, Aspirin, and Codeine  (Fiorinal with Codeine) []   Antidepressants  Diclofenac (Cambia) [x]     Amitriptyline (Elavil) [] Ibuprofen (Motrin) [x]     Desipramine (Norpramin) [] Indomethacin (Indocin) [] Aspirin, Caffeine, and Acetaminophen (Excedrin) (Goodys) []   Doxepin (Sinequan) [] Ketoprofen (Orudis) []     Fluoxetine (Prozac) [] Ketorolac (Toradol) [] Acetaminophen, Dichloralphenazone, and Isometheptene (Midrin) []   Imipramine (Tofranil) [] Naproxen (Anaprox) (Aleve) [x]     Nortriptyline (Pamelor) [] Meclofenamic Acid (Meclomen) []     Venlafaxine (Effexor) [] Meloxicam (Mobic) [] Aspirin, Salicylamide, and Caffeine (BC Powder) []          Review of Systems   Constitutional: Positive for fatigue. Negative for activity change, appetite change, chills and fever.   HENT: Negative for congestion, dental problem, ear pain, hearing loss, sinus pressure, tinnitus, trouble swallowing and voice change.    Eyes: Positive for visual disturbance. Negative for photophobia and pain.   Respiratory: Negative for cough, chest tightness and shortness of breath.    Cardiovascular: Negative for chest pain, palpitations and leg swelling.   Gastrointestinal: Negative for abdominal pain, blood in stool, constipation, diarrhea, nausea and vomiting.   Endocrine: Negative for cold intolerance and heat intolerance.   Genitourinary: Negative for difficulty urinating, dysuria and urgency.   Musculoskeletal: Negative for arthralgias, back pain, gait problem, myalgias, neck pain and neck stiffness.    Skin: Negative for color change, pallor and rash.   Neurological: Positive for tremors. Negative for dizziness, seizures, syncope, facial asymmetry, speech difficulty, weakness, light-headedness, numbness and headaches.   Hematological: Negative for adenopathy. Does not bruise/bleed easily.   Psychiatric/Behavioral: Negative for agitation, behavioral problems, confusion, decreased concentration, self-injury, sleep disturbance and suicidal ideas. The patient is not nervous/anxious.                Past Medical History:   Diagnosis Date    Acromegaly     Hypogonadism in male     Sleep apnea      Past Surgical History:   Procedure Laterality Date    EYE SURGERY      hydrocele       No family history on file.  Social History     Socioeconomic History    Marital status:      Spouse name: Not on file    Number of children: Not on file    Years of education: Not on file    Highest education level: Not on file   Occupational History    Not on file   Social Needs    Financial resource strain: Not on file    Food insecurity:     Worry: Not on file     Inability: Not on file    Transportation needs:     Medical: Not on file     Non-medical: Not on file   Tobacco Use    Smoking status: Never Smoker    Smokeless tobacco: Never Used   Substance and Sexual Activity    Alcohol use: No     Alcohol/week: 0.0 oz    Drug use: No    Sexual activity: Not on file   Lifestyle    Physical activity:     Days per week: Not on file     Minutes per session: Not on file    Stress: Not on file   Relationships    Social connections:     Talks on phone: Not on file     Gets together: Not on file     Attends [employer] service: Not on file     Active member of club or organization: Not on file     Attends meetings of clubs or organizations: Not on file     Relationship status: Not on file   Other Topics Concern    Not on file   Social History Narrative    Not on file     Review of patient's allergies indicates:  No  Known Allergies    Current Outpatient Medications:     ondansetron (ZOFRAN-ODT) 4 MG TbDL, Take 1 tablet (4 mg total) by mouth every 6 (six) hours as needed., Disp: 20 tablet, Rfl: 0    testosterone (ANDROGEL) 20.25 mg/1.25 gram (1.62 %) GlPm, APPLY 3 PUMPS TO SKIN DAILY AS DIRECTED, Disp: 150 g, Rfl: 5      Objective:      Vitals:    04/04/19 1105   BP: 114/84   Pulse: 60   Resp: 16     Body mass index is 27.05 kg/m².    Physical Exam    Constitutional:     He appears well-developed and well-nourished. He is well groomed  HENT:    Head: Dysmorphic face with course features, Macrognathia and macroglossia.   Eyes: Conjunctivae and EOM are normal. Pupils are equal, round, and reactive to light OU. Slight skew deviation  Neck: Neck supple. No thyromegaly present  Cardiovascular: Normal rate and normal heart sounds  Pulmonary/Chest: Effort normal and breath sounds normal  Musculoskeletal: Large and wide hands and feet  Skin: Skin is warm and dry  Psychiatric: Normal mood and affect     Neuro exam:    Mental status:  Awake, attentive, Alert, oriented to self, place, year and month  Language function is intact    Cranial Nerves:  Smell was not formally evaluated  Cranial Nerves II - XII: intact    Coordination:     Rapid alternating movements and rapid finger tapping - normal bilaterally  Finger to nose - normal and symmetric bilaterally   Heel to shin test - normal and symmetric bilaterally   Arm roll - smooth and symmetric   No intentional or positional tremor.     Motor:  Normal muscle bulk and symmetry. No fasciculations were noted    Strength 5/5 bilaterally     Reflexes:  Tendon reflexes were 2 + at biceps, triceps, brachioradialis, patellar, and Achilles bilaterally  On plantar stimulation toes were down going bilaterally  No clonus was noted     Sensory: Intact to light touch, pin prick in all extremities.    Gait: Romberg absent. Normal gait.     Review of Data:  Lab Results   Component Value Date      02/08/2018    K 4.4 02/08/2018     02/08/2018    CO2 25 02/08/2018    BUN 15 02/08/2018    CREATININE 1.00 02/08/2018     (H) 02/08/2018    HGBA1C 5.2 09/01/2015    AST 18 02/08/2018    ALT 16 02/08/2018    ALBUMIN 4.5 02/08/2018    ALBUMIN 4.5 10/06/2016    PROT 7.4 02/08/2018    BILITOT 1.0 02/08/2018    CHOL 139 05/09/2017    HDL 43 05/09/2017    LDLCALC 80.2 05/09/2017    TRIG 79 05/09/2017       Lab Results   Component Value Date    WBC 3.92 05/09/2017    HGB 13.8 (L) 05/09/2017    HCT 40.1 05/09/2017    MCV 87 05/09/2017     05/09/2017       Lab Results   Component Value Date    TSH 1.085 05/09/2017           Assessment and Plan   This patient with Acromegaly and known pituitary dysfunction presents with new onset of scintillating scotoma. He has history of abnormal MRI of the brain. He was told that surrounding area of pituitary underwent Some kind of event but no specific diagnosis given. This is not typically related to pituitary dysfunction, rather we see more commonly in association with PFO and ASD. Nonetheless, I will obtain a dedicated pituitary MRI W WO to assess the pituitary region. Will obtain a contrasted ECHO to assess persistency of ASD/PFO.    Refer to endocrinology for follow up and to assess further need of pituitary function    Gigantism and acromegaly  -     MRI Pituitary W W/O Contrast; Future; Expected date: 04/04/2019  2D ECHO with agitated saline          Dhaval Paiz M.D  Medical Director, Headache and Facial Pain  [facility]

## 2019-07-04 ENCOUNTER — HOSPITAL ENCOUNTER (EMERGENCY)
Facility: HOSPITAL | Age: 49
Discharge: HOME | End: 2019-07-04
Attending: EMERGENCY MEDICINE
Payer: COMMERCIAL

## 2019-07-04 VITALS
HEART RATE: 63 BPM | RESPIRATION RATE: 19 BRPM | BODY MASS INDEX: 27.93 KG/M2 | SYSTOLIC BLOOD PRESSURE: 109 MMHG | DIASTOLIC BLOOD PRESSURE: 62 MMHG | HEIGHT: 74 IN | TEMPERATURE: 99 F | OXYGEN SATURATION: 99 % | WEIGHT: 217.63 LBS

## 2019-07-04 DIAGNOSIS — Q67.6 PECTUS EXCAVATUM: Primary | ICD-10-CM

## 2019-07-04 DIAGNOSIS — D64.9 ANEMIA, UNSPECIFIED TYPE: ICD-10-CM

## 2019-07-04 DIAGNOSIS — R07.9 CHEST PAIN: ICD-10-CM

## 2019-07-04 LAB
ALBUMIN SERPL BCP-MCNC: 4.2 G/DL (ref 3.5–5.2)
ALP SERPL-CCNC: 60 U/L (ref 55–135)
ALT SERPL W/O P-5'-P-CCNC: 16 U/L (ref 10–44)
ANION GAP SERPL CALC-SCNC: 11 MMOL/L (ref 8–16)
AST SERPL-CCNC: 15 U/L (ref 10–40)
BASOPHILS # BLD AUTO: 0.01 K/UL (ref 0–0.2)
BASOPHILS NFR BLD: 0.1 % (ref 0–1.9)
BILIRUB SERPL-MCNC: 2.7 MG/DL (ref 0.1–1)
BNP SERPL-MCNC: 13 PG/ML (ref 0–99)
BUN SERPL-MCNC: 14 MG/DL (ref 6–20)
CALCIUM SERPL-MCNC: 9.2 MG/DL (ref 8.7–10.5)
CHLORIDE SERPL-SCNC: 105 MMOL/L (ref 95–110)
CO2 SERPL-SCNC: 23 MMOL/L (ref 23–29)
CREAT SERPL-MCNC: 0.9 MG/DL (ref 0.5–1.4)
DIFFERENTIAL METHOD: ABNORMAL
EOSINOPHIL # BLD AUTO: 0.1 K/UL (ref 0–0.5)
EOSINOPHIL NFR BLD: 0.8 % (ref 0–8)
ERYTHROCYTE [DISTWIDTH] IN BLOOD BY AUTOMATED COUNT: 13.1 % (ref 11.5–14.5)
EST. GFR  (AFRICAN AMERICAN): >60 ML/MIN/1.73 M^2
EST. GFR  (NON AFRICAN AMERICAN): >60 ML/MIN/1.73 M^2
GLUCOSE SERPL-MCNC: 91 MG/DL (ref 70–110)
HCT VFR BLD AUTO: 39.8 % (ref 40–54)
HGB BLD-MCNC: 13.4 G/DL (ref 14–18)
LYMPHOCYTES # BLD AUTO: 1.7 K/UL (ref 1–4.8)
LYMPHOCYTES NFR BLD: 23.5 % (ref 18–48)
MCH RBC QN AUTO: 29.7 PG (ref 27–31)
MCHC RBC AUTO-ENTMCNC: 33.7 G/DL (ref 32–36)
MCV RBC AUTO: 88 FL (ref 82–98)
MONOCYTES # BLD AUTO: 0.7 K/UL (ref 0.3–1)
MONOCYTES NFR BLD: 9.7 % (ref 4–15)
NEUTROPHILS # BLD AUTO: 4.8 K/UL (ref 1.8–7.7)
NEUTROPHILS NFR BLD: 65.9 % (ref 38–73)
PLATELET # BLD AUTO: 186 K/UL (ref 150–350)
PMV BLD AUTO: 9.2 FL (ref 9.2–12.9)
POTASSIUM SERPL-SCNC: 4.2 MMOL/L (ref 3.5–5.1)
PROT SERPL-MCNC: 7.6 G/DL (ref 6–8.4)
RBC # BLD AUTO: 4.51 M/UL (ref 4.6–6.2)
SODIUM SERPL-SCNC: 139 MMOL/L (ref 136–145)
TROPONIN I SERPL DL<=0.01 NG/ML-MCNC: 0.01 NG/ML (ref 0–0.03)
TROPONIN I SERPL DL<=0.01 NG/ML-MCNC: <0.006 NG/ML (ref 0–0.03)
WBC # BLD AUTO: 7.24 K/UL (ref 3.9–12.7)

## 2019-07-04 PROCEDURE — 93005 ELECTROCARDIOGRAM TRACING: CPT

## 2019-07-04 PROCEDURE — 93010 ELECTROCARDIOGRAM REPORT: CPT | Mod: ,,, | Performed by: INTERNAL MEDICINE

## 2019-07-04 PROCEDURE — 83880 ASSAY OF NATRIURETIC PEPTIDE: CPT

## 2019-07-04 PROCEDURE — 86703 HIV-1/HIV-2 1 RESULT ANTBDY: CPT

## 2019-07-04 PROCEDURE — 96361 HYDRATE IV INFUSION ADD-ON: CPT

## 2019-07-04 PROCEDURE — 84484 ASSAY OF TROPONIN QUANT: CPT

## 2019-07-04 PROCEDURE — 25500020 PHARM REV CODE 255: Performed by: EMERGENCY MEDICINE

## 2019-07-04 PROCEDURE — 93010 EKG 12-LEAD: ICD-10-PCS | Mod: ,,, | Performed by: INTERNAL MEDICINE

## 2019-07-04 PROCEDURE — 96374 THER/PROPH/DIAG INJ IV PUSH: CPT | Mod: 59

## 2019-07-04 PROCEDURE — 99285 EMERGENCY DEPT VISIT HI MDM: CPT | Mod: 25

## 2019-07-04 PROCEDURE — 85025 COMPLETE CBC W/AUTO DIFF WBC: CPT

## 2019-07-04 PROCEDURE — 63600175 PHARM REV CODE 636 W HCPCS: Performed by: EMERGENCY MEDICINE

## 2019-07-04 PROCEDURE — 25000003 PHARM REV CODE 250: Performed by: EMERGENCY MEDICINE

## 2019-07-04 PROCEDURE — 80053 COMPREHEN METABOLIC PANEL: CPT

## 2019-07-04 RX ORDER — NITROGLYCERIN 0.4 MG/1
0.4 TABLET SUBLINGUAL EVERY 5 MIN PRN
Status: DISCONTINUED | OUTPATIENT
Start: 2019-07-04 | End: 2019-07-04 | Stop reason: HOSPADM

## 2019-07-04 RX ORDER — NAPROXEN 500 MG/1
500 TABLET ORAL 2 TIMES DAILY WITH MEALS
Qty: 28 TABLET | Refills: 0 | Status: SHIPPED | OUTPATIENT
Start: 2019-07-04 | End: 2019-07-11

## 2019-07-04 RX ORDER — KETOROLAC TROMETHAMINE 30 MG/ML
30 INJECTION, SOLUTION INTRAMUSCULAR; INTRAVENOUS
Status: COMPLETED | OUTPATIENT
Start: 2019-07-04 | End: 2019-07-04

## 2019-07-04 RX ADMIN — NITROGLYCERIN 0.4 MG: 0.4 TABLET SUBLINGUAL at 11:07

## 2019-07-04 RX ADMIN — SODIUM CHLORIDE 500 ML: 0.9 INJECTION, SOLUTION INTRAVENOUS at 12:07

## 2019-07-04 RX ADMIN — IOHEXOL 100 ML: 350 INJECTION, SOLUTION INTRAVENOUS at 12:07

## 2019-07-04 RX ADMIN — KETOROLAC TROMETHAMINE 30 MG: 30 INJECTION, SOLUTION INTRAMUSCULAR; INTRAVENOUS at 02:07

## 2019-07-04 NOTE — ED PROVIDER NOTES
SCRIBE #1 NOTE: I, Zeeshan Mosqueda, am scribing for, and in the presence of, Jossy Honeycutt DO. I have scribed the entire note.      History      Chief Complaint   Patient presents with    Chest Pain     pt c/o chest pain since this morning with nausea and weakness       Review of patient's allergies indicates:  No Known Allergies     HPI   HPI    7/4/2019, 11:11 AM   History obtained from the patient      History of Present Illness: Stephon Lynne is a 48 y.o. male patient who presents to the Emergency Department for CP which onset gradually this morning around 7:30 AM. Pt states after waking up he had soreness in his chest. He thought his muscles were sore after working yesterday. Pt c/o the pain focused in the center of his chest radiating to his left collar bone.  Chest pain has been constant.  Pt also reports when he was drinking water he felt a slight bump in his chest. Symptoms are constant and moderate in severity. No mitigating or exacerbating factors reported. Associated sxs include bilateral upper extremity weakness and nausea this morning. Patient denies any SOB, vomiting, HA, numbness, abdominal pain, diaphoresis, fever, chills, diarrhea, dizziness, and all other sxs at this time. Prior Tx includes 3  ; 81 mg Asprin. No further complaints or concerns at this time.  Patient denies any prior history of coronary artery disease, hypertension, hyperlipidemia, or diabetes.  Patient does have a family history of coronary artery disease.  Patient denies any calf pain or calf tenderness Prior DVT or PE..        Arrival mode: Personal vehicle     PCP: Yuniel Simon MD       Past Medical History:  Past Medical History:   Diagnosis Date    Acromegaly     Hypogonadism in male     Sleep apnea        Past Surgical History:  Past Surgical History:   Procedure Laterality Date    EYE SURGERY      HERNIA REPAIR      hydrocele         Family History:  History reviewed. No pertinent family  history.      Social History:  Social History     Tobacco Use    Smoking status: Never Smoker    Smokeless tobacco: Never Used   Substance and Sexual Activity    Alcohol use: No     Alcohol/week: 0.0 oz    Drug use: No    Sexual activity: Unknown       ROS   Review of Systems   Constitutional: Negative for fever.   HENT: Negative for sore throat.    Respiratory: Negative for shortness of breath.    Cardiovascular: Positive for chest pain.   Gastrointestinal: Positive for nausea. Negative for abdominal pain and vomiting.   Genitourinary: Negative for dysuria and frequency.   Musculoskeletal: Negative for back pain.   Skin: Negative for rash.   Neurological: Positive for weakness (upper extremities). Negative for dizziness and headaches.   Hematological: Does not bruise/bleed easily.   All other systems reviewed and are negative.      Physical Exam      Initial Vitals [07/04/19 1053]   BP Pulse Resp Temp SpO2   109/61 85 18 98.2 °F (36.8 °C) 98 %      MAP       --          Physical Exam  Nursing Notes and Vital Signs Reviewed.  Constitutional: Patient is in no acute distress. Well-developed and well-nourished.  Head: Atraumatic. Normocephalic.  Eyes: PERRL. EOM intact. Conjunctivae are not pale. No scleral icterus.  ENT: Mucous membranes are moist. Oropharynx is clear and symmetric.    Neck: Supple. Full ROM. No lymphadenopathy.  Cardiovascular: Regular rate. Regular rhythm. No murmurs, rubs, or gallops. Distal pulses are 2+ and symmetric.  Pulmonary/Chest: No respiratory distress. Clear to auscultation bilaterally. No wheezing or rales. Positive Pectus excavatum.  Abdominal: Soft and non-distended.  There is no tenderness.  No rebound, guarding, or rigidity. Good bowel sounds.  Musculoskeletal: Moves all extremities. No obvious deformities. No edema. No calf tenderness.  Skin: Warm and dry.  Neurological:  Alert, awake, and appropriate.  Normal speech.  No acute focal neurological deficits are appreciated. CN 2  through 12 intact  Psychiatric: Normal affect. Good eye contact. Appropriate in content.    ED Course    Procedures  ED Vital Signs:  Vitals:    07/04/19 1102 07/04/19 1110 07/04/19 1115 07/04/19 1116   BP: (!) 117/59 (!) 110/58  104/61   Pulse: 77 76 76 77   Resp: 17 18 18   Temp:       TempSrc:       SpO2: 99% 98%  98%   Weight:       Height:        07/04/19 1131 07/04/19 1146 07/04/19 1151 07/04/19 1259   BP: 104/64 (!) 98/58 (!) 102/59 (!) 100/56   Pulse: 77 80 75 69   Resp: 16 19 11 17   Temp:       TempSrc:       SpO2: 96% 97% 98% 99%   Weight:       Height:        07/04/19 1330 07/04/19 1444 07/04/19 1447 07/04/19 1520   BP: (!) 102/58 (!) 98/56  (!) 99/59   Pulse: 67 68  67   Resp: 16 19   Temp:   98.8 °F (37.1 °C)    TempSrc:   Oral    SpO2: 97% 99%  97%   Weight:       Height:        07/04/19 1538 07/04/19 1542 07/04/19 1547   BP:  109/62    Pulse:  63    Resp:  19    Temp: 98.8 °F (37.1 °C)  98.8 °F (37.1 °C)   TempSrc:   Oral   SpO2:  99%    Weight:      Height:          Abnormal Lab Results:  Labs Reviewed   CBC W/ AUTO DIFFERENTIAL - Abnormal; Notable for the following components:       Result Value    RBC 4.51 (*)     Hemoglobin 13.4 (*)     Hematocrit 39.8 (*)     All other components within normal limits   COMPREHENSIVE METABOLIC PANEL - Abnormal; Notable for the following components:    Total Bilirubin 2.7 (*)     All other components within normal limits   TROPONIN I   TROPONIN I   B-TYPE NATRIURETIC PEPTIDE   HIV 1 / 2 ANTIBODY        All Lab Results:  Results for orders placed or performed during the hospital encounter of 07/04/19   CBC auto differential   Result Value Ref Range    WBC 7.24 3.90 - 12.70 K/uL    RBC 4.51 (L) 4.60 - 6.20 M/uL    Hemoglobin 13.4 (L) 14.0 - 18.0 g/dL    Hematocrit 39.8 (L) 40.0 - 54.0 %    Mean Corpuscular Volume 88 82 - 98 fL    Mean Corpuscular Hemoglobin 29.7 27.0 - 31.0 pg    Mean Corpuscular Hemoglobin Conc 33.7 32.0 - 36.0 g/dL    RDW 13.1 11.5 - 14.5 %     Platelets 186 150 - 350 K/uL    MPV 9.2 9.2 - 12.9 fL    Gran # (ANC) 4.8 1.8 - 7.7 K/uL    Lymph # 1.7 1.0 - 4.8 K/uL    Mono # 0.7 0.3 - 1.0 K/uL    Eos # 0.1 0.0 - 0.5 K/uL    Baso # 0.01 0.00 - 0.20 K/uL    Gran% 65.9 38.0 - 73.0 %    Lymph% 23.5 18.0 - 48.0 %    Mono% 9.7 4.0 - 15.0 %    Eosinophil% 0.8 0.0 - 8.0 %    Basophil% 0.1 0.0 - 1.9 %    Differential Method Automated    Comprehensive metabolic panel   Result Value Ref Range    Sodium 139 136 - 145 mmol/L    Potassium 4.2 3.5 - 5.1 mmol/L    Chloride 105 95 - 110 mmol/L    CO2 23 23 - 29 mmol/L    Glucose 91 70 - 110 mg/dL    BUN, Bld 14 6 - 20 mg/dL    Creatinine 0.9 0.5 - 1.4 mg/dL    Calcium 9.2 8.7 - 10.5 mg/dL    Total Protein 7.6 6.0 - 8.4 g/dL    Albumin 4.2 3.5 - 5.2 g/dL    Total Bilirubin 2.7 (H) 0.1 - 1.0 mg/dL    Alkaline Phosphatase 60 55 - 135 U/L    AST 15 10 - 40 U/L    ALT 16 10 - 44 U/L    Anion Gap 11 8 - 16 mmol/L    eGFR if African American >60 >60 mL/min/1.73 m^2    eGFR if non African American >60 >60 mL/min/1.73 m^2   Troponin I #1   Result Value Ref Range    Troponin I <0.006 0.000 - 0.026 ng/mL   Troponin I #2   Result Value Ref Range    Troponin I 0.011 0.000 - 0.026 ng/mL   B-Type natriuretic peptide (BNP)   Result Value Ref Range    BNP 13 0 - 99 pg/mL     Results for STEWART DÍAZ (MRN [id number]) as of 7/4/2019 12:21   Ref. Range 11/13/2015 07:58 10/6/2016 09:00 5/9/2017 10:17 2/8/2018 07:09 2/15/2019 08:23 7/4/2019 11:26   BILIRUBIN TOTAL Latest Ref Range: 0.1 - 1.0 mg/dL 1.6 (H) 2.5 (H) 1.1 (H) 1.0  2.7 (H)       Imaging Results:  Imaging Results          CTA Chest Non-Coronary (PE Study) (Final result)  Result time 07/04/19 13:13:10    Final result by Zeeshan Sims Jr., MD (07/04/19 13:13:10)                 Impression:      No pulmonary embolus.    Subtle patchy right upper lobe infiltrate.    All CT scan at this facility use dose modulation, iterative reconstruction, and/or weight base dosing when appropriate to reduce  radiation dose to as low as reasonably achievable.      Electronically signed by: Zeeshan Sims Jr., MD  Date:    07/04/2019  Time:    13:13             Narrative:    EXAMINATION:  CTA CHEST NON CORONARY    CLINICAL HISTORY:  Chest pain, normal ekg;    TECHNIQUE:  Axial CTA images performed through the chest after the administration of 100 cc intravenous contrast. Maximum intensity projections were performed and interpreted.    FINDINGS:  There is no evidence of pulmonary embolus. Pulmonary artery caliber is normal. The heart, great vessels, and mediastinal structures are within normal limits. No thoracic adenopathy.    In the patchy right upper lobe infiltrate.  No effusion or pneumothorax.    The upper abdominal visualized organs are within normal limits.    The bones are intact.                               X-Ray Chest AP Portable (Final result)  Result time 07/04/19 11:32:00    Final result by Alejandro Garcia MD (07/04/19 11:32:00)                 Impression:      No acute findings.      Electronically signed by: Alejandro Garcia MD  Date:    07/04/2019  Time:    11:32             Narrative:    EXAMINATION:  XR CHEST AP PORTABLE    CLINICAL HISTORY:  Acute chest pain, Chest Pain;    COMPARISON:  None    FINDINGS:  Heart size is normal.  Aortic arch is mildly prominent.  The lung fields are clear at this time.                               The EKG was ordered, reviewed, and independently interpreted by the ED provider.  Interpretation time: 10:53  Rate: 72 BPM  Rhythm: normal sinus rhythm  Interpretation: possible left atrial enlargement. No STEMI.             The Emergency Provider reviewed the vital signs and test results, which are outlined above.    ED Discussion     12:22 PM: Re-evaluated pt. Pt is resting comfortably and is in no acute distress.  D/w pt all pertinent results. D/w pt any concerns expressed at this time. Answered all questions. Pt expresses understanding at this time.    1:37 PM:   Re-evaluated pt. Pt is resting comfortably and expresses no major concerns or complaints.     2:57 PM: Repeat troponin  Results for STEWART DÍAZ (MRN [id number]) as of 7/4/2019 14:56   Ref. Range 7/4/2019 11:26 7/4/2019 11:30 7/4/2019 12:50 7/4/2019 14:13   Troponin I Latest Ref Range: 0.000 - 0.026 ng/mL <0.006   0.011     3:18 PM: Reassessed pt at this time.  Pt states his condition has improved at this time. Discussed with pt all pertinent ED information and results. Discussed pt dx and plan of tx. Gave pt all f/u and return to the ED instructions. All questions and concerns were addressed at this time. Pt expresses understanding of information and instructions, and is comfortable with plan to discharge. Pt is stable for discharge.    I have discussed with patient and/or family/caretaker chest pain precautions, specifically to return for worsening chest pain, shortness of breath, fever, or any concern.  I have low suspicion for cardiopulmonary, vascular, infectious, respiratory, or other emergent medical condition based on my evaluation in the ED.    I discussed with patient and/or family/caretaker that evaluation in the ED does not suggest any emergent or life threatening medical conditions requiring immediate intervention beyond what was provided in the ED, and I believe patient is safe for discharge.  Regardless, an unremarkable evaluation in the ED does not preclude the development or presence of a serious of life threatening condition. As such, patient was instructed to return immediately for any worsening or change in current symptoms.      ED Medication(s):  Medications   sodium chloride 0.9% bolus 500 mL (0 mLs Intravenous Stopped 7/4/19 1345)   iohexol (OMNIPAQUE 350) injection 100 mL (100 mLs Intravenous Given 7/4/19 1250)   ketorolac injection 30 mg (30 mg Intravenous Given 7/4/19 8104)     Discharge Medication List as of 7/4/2019  3:18 PM      START taking these medications    Details   naproxen  (NAPROSYN) 500 MG tablet Take 1 tablet (500 mg total) by mouth 2 (two) times daily with meals., Starting Thu 7/4/2019, Print               Follow-up Information     Jacky Seaman MD In 2 days.    Specialty:  Cardiology  Why:  Return to the ED for: Worsening pain, nausea, vomiting, breaking out in sweat or other concerns.  Contact information:  [id number] [facility]  [place] [address] [phone]                     Medical Decision Making    Medical Decision Making:   Clinical Tests:   Lab Tests: Ordered and Reviewed  Radiological Study: Ordered and Reviewed  Medical Tests: Ordered and Reviewed     Additional MDM:   Heart Score:    History:          Slightly suspicious.  ECG:             Normal  Age:               45-65 years  Risk factors: 1-2 risk factors  Troponin:       Less than or equal to normal limit  Final Score: 2           Scribe Attestation:   Scribe #1: I performed the above scribed service and the documentation accurately describes the services I performed. I attest to the accuracy of the note.    Attending:   Physician Attestation Statement for Scribe #1: I, Jossy Honeycutt DO, personally performed the services described in this documentation, as scribed by Zeeshan Mosqueda, in my presence, and it is both accurate and complete.          Clinical Impression       ICD-10-CM ICD-9-CM   1. Pectus excavatum Q67.6 754.81   2. Chest pain R07.9 786.50   3. Anemia, unspecified type D64.9 285.9       Disposition:   Disposition: Discharged  Condition: Stable       Jossy Honeycutt DO  07/05/19 0972

## 2019-07-05 LAB — HIV 1+2 AB+HIV1 P24 AG SERPL QL IA: NEGATIVE

## 2019-07-08 ENCOUNTER — TELEPHONE (OUTPATIENT)
Dept: CARDIOLOGY | Facility: CLINIC | Age: 49
End: 2019-07-08

## 2019-07-08 NOTE — TELEPHONE ENCOUNTER
----- Message from Valentina Hewitt sent at 7/8/2019  7:42 AM CDT -----  Contact: pt spouse   Type:  Sooner Apoointment Request    Caller is requesting a sooner appointment.  Caller declined first available appointment listed below.  Caller will not accept being placed on the waitlist and is requesting a message be sent to doctor.  Name of Caller:Courtney/pt spouse   When is the first available appointment? 08/14/19  Symptoms:F/U after ER visit   Would the patient rather a call back or a response via [facility]? Call back   Best Call Back Number:[phone]  Additional Information:

## 2019-07-11 ENCOUNTER — OFFICE VISIT (OUTPATIENT)
Dept: CARDIOLOGY | Facility: CLINIC | Age: 49
End: 2019-07-11
Payer: COMMERCIAL

## 2019-07-11 VITALS
BODY MASS INDEX: 28.71 KG/M2 | DIASTOLIC BLOOD PRESSURE: 80 MMHG | HEART RATE: 72 BPM | HEIGHT: 74 IN | WEIGHT: 223.75 LBS | SYSTOLIC BLOOD PRESSURE: 100 MMHG

## 2019-07-11 DIAGNOSIS — R07.89 OTHER CHEST PAIN: ICD-10-CM

## 2019-07-11 DIAGNOSIS — G47.33 OSA (OBSTRUCTIVE SLEEP APNEA): ICD-10-CM

## 2019-07-11 DIAGNOSIS — R07.9 CHEST PAIN, UNSPECIFIED TYPE: Primary | ICD-10-CM

## 2019-07-11 PROCEDURE — 99999 PR PBB SHADOW E&M-EST. PATIENT-LVL III: ICD-10-PCS | Mod: PBBFAC,,, | Performed by: INTERNAL MEDICINE

## 2019-07-11 PROCEDURE — 99204 OFFICE O/P NEW MOD 45 MIN: CPT | Mod: S$GLB,,, | Performed by: INTERNAL MEDICINE

## 2019-07-11 PROCEDURE — 99204 PR OFFICE/OUTPT VISIT, NEW, LEVL IV, 45-59 MIN: ICD-10-PCS | Mod: S$GLB,,, | Performed by: INTERNAL MEDICINE

## 2019-07-11 PROCEDURE — 3008F PR BODY MASS INDEX (BMI) DOCUMENTED: ICD-10-PCS | Mod: CPTII,S$GLB,, | Performed by: INTERNAL MEDICINE

## 2019-07-11 PROCEDURE — 3008F BODY MASS INDEX DOCD: CPT | Mod: CPTII,S$GLB,, | Performed by: INTERNAL MEDICINE

## 2019-07-11 PROCEDURE — 99999 PR PBB SHADOW E&M-EST. PATIENT-LVL III: CPT | Mod: PBBFAC,,, | Performed by: INTERNAL MEDICINE

## 2019-07-11 RX ORDER — ACETAMINOPHEN, DEXTROMETHORPHAN HBR, DOXYLAMINE SUCCINATE, PHENYLEPHRINE HCL 650; 20; 12.5; 1 MG/30ML; MG/30ML; MG/30ML; MG/30ML
1 SOLUTION ORAL DAILY
COMMUNITY

## 2019-07-11 NOTE — PROGRESS NOTES
Subjective:   Patient ID:  Stephon Lynne is a 48 y.o. male who presents for cardiac consult of Chest Pain (E.R. f/u)      HPI  The patient came in today for cardiac consult of Chest Pain (E.R. f/u)    7/11/19  Stephon Lynne is a 48 y.o. male pt with YURI, hypogonadism, former smoker presents for initial CV evaluation after recent ER visit.     He went to the ER last week with - CP - which onset gradually that morning around 7:30 AM. Pt stated after waking up he had soreness in his chest. He thought his muscles were sore after working yesterday. Pt c/o the pain focused in the center of his chest radiating to his left collar bone.  Chest pain has been constant.  Pt also reports when he was drinking water he felt a slight bump in his chest. Symptoms are constant and moderate in severity. No mitigating or exacerbating factors reported. Associated sxs include bilateral upper extremity weakness and nausea this morning. Patient denies any SOB, vomiting, HA, numbness, abdominal pain, diaphoresis, fever, chills, diarrhea, dizziness, and all other sxs at this time. Prior Tx includes 3 ; 81 mg Asprin.     PT had neg trops x 2, CTA neg for PE. ECG neg for ischemia.  He had neg workup in 2003 as well. He has intermittent bradycardia as well, has been using old CPAP machine, needs updated settings.     Patient feels  no leg swelling, no PND, no palpitation, no dizziness, no syncope, no CNS symptoms.    Patient has fairly good exercise tolerance. Works as .     Patient is compliant with medications.    FH - brother with multiple stents    7/4/19 ECG  Normal sinus rhythm  Possible Left atrial enlargement  Left axis deviation    Past Medical History:   Diagnosis Date    Acromegaly     Hypogonadism in male     Sleep apnea        Past Surgical History:   Procedure Laterality Date    EYE SURGERY      HERNIA REPAIR      hydrocele         Social History     Tobacco Use    Smoking status: Former Smoker      Types: Cigarettes     Last attempt to quit: 2002     Years since quittin.5    Smokeless tobacco: Never Used   Substance Use Topics    Alcohol use: Not Currently     Alcohol/week: 0.0 oz    Drug use: No       Family History   Problem Relation Age of Onset    Aortic aneurysm Mother     Hyperlipidemia Father     Pulmonary embolism Sister     Heart attack Brother     Aortic aneurysm Sister        Patient's Medications   New Prescriptions    No medications on file   Previous Medications    CYANOCOBALAMIN, VITAMIN B-12, (VITAMIN B-12) 1,000 MCG TBSR    Take 1 tablet by mouth once daily.    ONDANSETRON (ZOFRAN-ODT) 4 MG TBDL    Take 1 tablet (4 mg total) by mouth every 6 (six) hours as needed.    DON'S WORT ORAL    Take 1 tablet by mouth once daily.    Modified Medications    No medications on file   Discontinued Medications    NAPROXEN (NAPROSYN) 500 MG TABLET    Take 1 tablet (500 mg total) by mouth 2 (two) times daily with meals.    TESTOSTERONE (ANDROGEL) 20.25 MG/1.25 GRAM (1.62 %) GLPM    APPLY 3 PUMPS TO SKIN DAILY AS DIRECTED       Review of Systems   Constitutional: Positive for malaise/fatigue.   HENT: Negative.    Eyes: Negative.    Respiratory: Positive for shortness of breath.    Cardiovascular: Positive for chest pain.   Gastrointestinal: Negative.    Genitourinary: Negative.    Musculoskeletal: Negative.    Skin: Negative.    Neurological: Negative.    Endo/Heme/Allergies: Negative.    Psychiatric/Behavioral: Negative.    All 12 systems otherwise negative.      Wt Readings from Last 3 Encounters:   19 101.5 kg (223 lb 12.3 oz)   19 98.7 kg (217 lb 9.5 oz)   19 95.5 kg (210 lb 10.4 oz)     Temp Readings from Last 3 Encounters:   19 98.8 °F (37.1 °C) (Oral)   02/15/19 98.6 °F (37 °C)   18 98.2 °F (36.8 °C)     BP Readings from Last 3 Encounters:   19 100/80   19 109/62   19 114/84     Pulse Readings from Last 3 Encounters:   19 72  "  07/04/19 63   04/04/19 60       /80 (BP Method: Large (Manual))   Pulse 72   Ht 6' 2" (1.88 m)   Wt 101.5 kg (223 lb 12.3 oz)   BMI 28.73 kg/m²     Objective:   Physical Exam   Constitutional: He is oriented to person, place, and time. He appears well-developed and well-nourished. No distress.   HENT:   Head: Normocephalic and atraumatic.   Nose: Nose normal.   Mouth/Throat: Oropharynx is clear and moist.   Eyes: Conjunctivae and EOM are normal. No scleral icterus.   Neck: Normal range of motion. Neck supple. No JVD present. No thyromegaly present.   Cardiovascular: Normal rate, regular rhythm, S1 normal and S2 normal. Exam reveals no gallop, no S3, no S4 and no friction rub.   No murmur heard.  Pulmonary/Chest: Effort normal and breath sounds normal. No stridor. No respiratory distress. He has no wheezes. He has no rales. He exhibits no tenderness.   Abdominal: Soft. Bowel sounds are normal. He exhibits no distension and no mass. There is no tenderness. There is no rebound.   Genitourinary:   Genitourinary Comments: Deferred   Musculoskeletal: Normal range of motion. He exhibits no edema, tenderness or deformity.   Lymphadenopathy:     He has no cervical adenopathy.   Neurological: He is alert and oriented to person, place, and time. He exhibits normal muscle tone. Coordination normal.   Skin: Skin is warm and dry. No rash noted. He is not diaphoretic. No erythema. No pallor.   Psychiatric: He has a normal mood and affect. His behavior is normal. Judgment and thought content normal.   Nursing note and vitals reviewed.      Lab Results   Component Value Date     07/04/2019    K 4.2 07/04/2019     07/04/2019    CO2 23 07/04/2019    BUN 14 07/04/2019    CREATININE 0.9 07/04/2019    GLU 91 07/04/2019    HGBA1C 5.2 09/01/2015    AST 15 07/04/2019    ALT 16 07/04/2019    ALBUMIN 4.2 07/04/2019    ALBUMIN 4.5 10/06/2016    PROT 7.6 07/04/2019    BILITOT 2.7 (H) 07/04/2019    WBC 7.24 07/04/2019    " HGB 13.4 (L) 07/04/2019    HCT 39.8 (L) 07/04/2019    MCV 88 07/04/2019     07/04/2019    TSH 1.085 05/09/2017    CHOL 139 05/09/2017    HDL 43 05/09/2017    LDLCALC 80.2 05/09/2017    TRIG 79 05/09/2017    BNP 13 07/04/2019     Assessment:      1. Chest pain, unspecified type    2. Other chest pain    3. YURI (obstructive sleep apnea)        Plan:   1. Chest pain with significant RFs  - recent ER workup neg   - will order exercise nuclear stress test  - 2D ECHO ordered  - discussed non cardiac etiologies as well    2. YURI  - needs to f/u with pulm    Thank you for allowing me to participate in this patient's care. Please do not hesitate to contact me with any questions or concerns. Consult note has been forwarded to the referral physician.

## 2019-07-23 ENCOUNTER — HOSPITAL ENCOUNTER (OUTPATIENT)
Dept: RADIOLOGY | Facility: HOSPITAL | Age: 49
Discharge: HOME | End: 2019-07-23
Attending: INTERNAL MEDICINE
Payer: COMMERCIAL

## 2019-07-23 ENCOUNTER — CLINICAL SUPPORT (OUTPATIENT)
Dept: CARDIOLOGY | Facility: CLINIC | Age: 49
End: 2019-07-23
Attending: INTERNAL MEDICINE
Payer: COMMERCIAL

## 2019-07-23 DIAGNOSIS — R07.9 CHEST PAIN, UNSPECIFIED TYPE: ICD-10-CM

## 2019-07-23 DIAGNOSIS — R07.89 OTHER CHEST PAIN: ICD-10-CM

## 2019-07-23 LAB
DIASTOLIC DYSFUNCTION: NO
DIASTOLIC DYSFUNCTION: YES
ESTIMATED PA SYSTOLIC PRESSURE: 28.2
RETIRED EF AND QEF - SEE NOTES: 45 (ref 55–65)

## 2019-07-23 PROCEDURE — 78452 HT MUSCLE IMAGE SPECT MULT: CPT | Mod: 26,,, | Performed by: INTERNAL MEDICINE

## 2019-07-23 PROCEDURE — 93306 TTE W/DOPPLER COMPLETE: CPT | Mod: PBBFAC | Performed by: INTERNAL MEDICINE

## 2019-07-23 PROCEDURE — 78452 NM MULTI TREAD STRESS CARDIAC COMPONENT: ICD-10-PCS | Mod: 26,,, | Performed by: INTERNAL MEDICINE

## 2019-07-23 PROCEDURE — 93306 2D ECHO WITH COLOR FLOW DOPPLER: ICD-10-PCS | Mod: S$GLB,,, | Performed by: INTERNAL MEDICINE

## 2019-07-23 PROCEDURE — 93015 NM MULTI TREAD STRESS CARDIAC COMPONENT: ICD-10-PCS | Mod: S$GLB,,, | Performed by: INTERNAL MEDICINE

## 2019-07-23 PROCEDURE — A9502 TC99M TETROFOSMIN: HCPCS

## 2019-07-23 PROCEDURE — 93015 CV STRESS TEST SUPVJ I&R: CPT | Mod: S$GLB,,, | Performed by: INTERNAL MEDICINE

## 2019-08-07 ENCOUNTER — OFFICE VISIT (OUTPATIENT)
Dept: PULMONOLOGY | Facility: CLINIC | Age: 49
End: 2019-08-07
Payer: COMMERCIAL

## 2019-08-07 ENCOUNTER — OFFICE VISIT (OUTPATIENT)
Dept: CARDIOLOGY | Facility: CLINIC | Age: 49
End: 2019-08-07
Payer: COMMERCIAL

## 2019-08-07 ENCOUNTER — LAB VISIT (OUTPATIENT)
Dept: LAB | Facility: HOSPITAL | Age: 49
End: 2019-08-07
Attending: INTERNAL MEDICINE
Payer: COMMERCIAL

## 2019-08-07 VITALS
SYSTOLIC BLOOD PRESSURE: 100 MMHG | BODY MASS INDEX: 29.09 KG/M2 | OXYGEN SATURATION: 96 % | HEART RATE: 82 BPM | DIASTOLIC BLOOD PRESSURE: 77 MMHG | HEIGHT: 74 IN | WEIGHT: 226.63 LBS | RESPIRATION RATE: 18 BRPM

## 2019-08-07 VITALS
DIASTOLIC BLOOD PRESSURE: 80 MMHG | SYSTOLIC BLOOD PRESSURE: 102 MMHG | HEART RATE: 83 BPM | OXYGEN SATURATION: 96 % | BODY MASS INDEX: 29.09 KG/M2 | WEIGHT: 226.63 LBS | HEIGHT: 74 IN

## 2019-08-07 DIAGNOSIS — G47.33 OSA (OBSTRUCTIVE SLEEP APNEA): ICD-10-CM

## 2019-08-07 DIAGNOSIS — R07.89 OTHER CHEST PAIN: ICD-10-CM

## 2019-08-07 DIAGNOSIS — Z82.49 FAMILY HISTORY OF PREMATURE CAD: ICD-10-CM

## 2019-08-07 DIAGNOSIS — R06.09 DYSPNEA ON EXERTION: ICD-10-CM

## 2019-08-07 DIAGNOSIS — R07.89 CHEST WALL PAIN: ICD-10-CM

## 2019-08-07 DIAGNOSIS — R93.89 ABNORMAL CT OF THE CHEST: Primary | ICD-10-CM

## 2019-08-07 DIAGNOSIS — R55 NEAR SYNCOPE: Primary | ICD-10-CM

## 2019-08-07 DIAGNOSIS — R93.1 ABNORMAL ECHOCARDIOGRAM: ICD-10-CM

## 2019-08-07 DIAGNOSIS — R55 NEAR SYNCOPE: ICD-10-CM

## 2019-08-07 PROCEDURE — 99999 PR PBB SHADOW E&M-EST. PATIENT-LVL III: CPT | Mod: PBBFAC,,, | Performed by: INTERNAL MEDICINE

## 2019-08-07 PROCEDURE — 36415 COLL VENOUS BLD VENIPUNCTURE: CPT

## 2019-08-07 PROCEDURE — 99999 PR PBB SHADOW E&M-EST. PATIENT-LVL III: ICD-10-PCS | Mod: PBBFAC,,, | Performed by: INTERNAL MEDICINE

## 2019-08-07 PROCEDURE — 99214 PR OFFICE/OUTPT VISIT, EST, LEVL IV, 30-39 MIN: ICD-10-PCS | Mod: S$GLB,,, | Performed by: INTERNAL MEDICINE

## 2019-08-07 PROCEDURE — 3008F BODY MASS INDEX DOCD: CPT | Mod: CPTII,S$GLB,, | Performed by: INTERNAL MEDICINE

## 2019-08-07 PROCEDURE — 3008F PR BODY MASS INDEX (BMI) DOCUMENTED: ICD-10-PCS | Mod: CPTII,S$GLB,, | Performed by: INTERNAL MEDICINE

## 2019-08-07 PROCEDURE — 82306 VITAMIN D 25 HYDROXY: CPT

## 2019-08-07 PROCEDURE — 99205 OFFICE O/P NEW HI 60 MIN: CPT | Mod: S$GLB,,, | Performed by: INTERNAL MEDICINE

## 2019-08-07 PROCEDURE — 99213 OFFICE O/P EST LOW 20 MIN: CPT | Mod: PBBFAC | Performed by: INTERNAL MEDICINE

## 2019-08-07 PROCEDURE — 99205 PR OFFICE/OUTPT VISIT, NEW, LEVL V, 60-74 MIN: ICD-10-PCS | Mod: S$GLB,,, | Performed by: INTERNAL MEDICINE

## 2019-08-07 PROCEDURE — 84443 ASSAY THYROID STIM HORMONE: CPT

## 2019-08-07 PROCEDURE — 99214 OFFICE O/P EST MOD 30 MIN: CPT | Mod: S$GLB,,, | Performed by: INTERNAL MEDICINE

## 2019-08-07 NOTE — PATIENT INSTRUCTIONS
Please call the [facility] to schedule sleep study at  [phone] . Usually take 1- 2 days to get insurance company approval.  You will need to schedule at follow up clinic appointment 7 days after the sleep study to review the results.

## 2019-08-07 NOTE — PROGRESS NOTES
Subjective:   Patient ID:  Stephon Lynne is a 48 y.o. male who presents for follow up of chest pain, unspecified type      HPI  A 47 yo male with acromegaly untreated sleep apnea is here fro f/u he had an episode of nausea looked bad felt chest pressure fatigue nausea. He has  An eval done showing normal lvf by cardiolite and negative cardiolite . He still has episodes suggestive of vagal symptoms. He has untreated sleep apnea has not been using his machine regularily he has exertional shortness of breath he feels tired fatigued. Has chest pain gets near syncopal if he exerts pressure or bears down . He has conduction abnormalities on ekg he has pectus excavatum.  He stays well  Hydrated.   Past Medical History:   Diagnosis Date    Abnormal echocardiogram 2019    Acromegaly     Family history of premature CAD 2019    Hypogonadism in male     Near syncope 2019    Other chest pain 2019    Sleep apnea        Past Surgical History:   Procedure Laterality Date    EYE SURGERY      HERNIA REPAIR      hydrocele         Social History     Tobacco Use    Smoking status: Former Smoker     Types: Cigarettes     Last attempt to quit: 2002     Years since quittin.6    Smokeless tobacco: Never Used   Substance Use Topics    Alcohol use: Not Currently     Alcohol/week: 0.0 oz    Drug use: No       Family History   Problem Relation Age of Onset    Aortic aneurysm Mother     Hyperlipidemia Father     Pulmonary embolism Sister     Heart attack Brother     Aortic aneurysm Sister        Current Outpatient Medications   Medication Sig    cyanocobalamin, vitamin B-12, (VITAMIN B-12) 1,000 mcg TbSR Take 1 tablet by mouth once daily.    ondansetron (ZOFRAN-ODT) 4 MG TbDL Take 1 tablet (4 mg total) by mouth every 6 (six) hours as needed.    DON'S WORT ORAL Take 1 tablet by mouth once daily.      No current facility-administered medications for this visit.      Current Outpatient  Medications on File Prior to Visit   Medication Sig    cyanocobalamin, vitamin B-12, (VITAMIN B-12) 1,000 mcg TbSR Take 1 tablet by mouth once daily.    ondansetron (ZOFRAN-ODT) 4 MG TbDL Take 1 tablet (4 mg total) by mouth every 6 (six) hours as needed.    DON'S WORT ORAL Take 1 tablet by mouth once daily.      No current facility-administered medications on file prior to visit.      Review of patient's allergies indicates:  No Known Allergies  Review of Systems   Constitution: Positive for malaise/fatigue.   Eyes: Negative for blurred vision.   Cardiovascular: Positive for chest pain and dyspnea on exertion. Negative for claudication, cyanosis, irregular heartbeat, leg swelling, near-syncope, orthopnea, palpitations and paroxysmal nocturnal dyspnea.   Respiratory: Positive for shortness of breath, sleep disturbances due to breathing and snoring. Negative for cough and hemoptysis.    Hematologic/Lymphatic: Negative for bleeding problem. Does not bruise/bleed easily.   Skin: Negative for dry skin and itching.   Musculoskeletal: Negative for falls, muscle weakness and myalgias.   Gastrointestinal: Positive for nausea. Negative for abdominal pain, diarrhea, heartburn, hematemesis, hematochezia and melena.   Genitourinary: Negative for flank pain and hematuria.   Neurological: Positive for weakness. Negative for dizziness, focal weakness, headaches, light-headedness, numbness, paresthesias and seizures.   Psychiatric/Behavioral: Negative for altered mental status and memory loss. The patient is not nervous/anxious.    Allergic/Immunologic: Negative for hives.       Objective:   Physical Exam   Constitutional: He is oriented to person, place, and time. He appears well-developed and well-nourished. No distress.   HENT:   Head: Normocephalic and atraumatic.   Eyes: Pupils are equal, round, and reactive to light. EOM are normal. Right eye exhibits no discharge. Left eye exhibits no discharge.   Neck: Neck supple.  "No JVD present. No thyromegaly present.   Cardiovascular: Normal rate, regular rhythm, normal heart sounds and intact distal pulses. Exam reveals no gallop and no friction rub.   No murmur heard.  Pulmonary/Chest: Effort normal and breath sounds normal. No respiratory distress. He has no wheezes. He has no rales. He exhibits no tenderness.   Pectus excavatum noted.   Abdominal: Soft. Bowel sounds are normal. He exhibits no distension. There is no tenderness.   Musculoskeletal: Normal range of motion. He exhibits no edema.   Neurological: He is alert and oriented to person, place, and time. No cranial nerve deficit.   Skin: Skin is warm and dry. No rash noted. He is not diaphoretic. No erythema.   Psychiatric: He has a normal mood and affect. His behavior is normal.   Nursing note and vitals reviewed.    Vitals:    08/07/19 1347 08/07/19 1350   BP: 96/78 102/80   BP Location: Left arm Right arm   Patient Position: Sitting Sitting   Pulse: 83    SpO2: 96%    Weight: 102.8 kg (226 lb 10.1 oz)    Height: 6' 2" (1.88 m)      Lab Results   Component Value Date    CHOL 139 05/09/2017    CHOL 126 09/01/2015     Lab Results   Component Value Date    HDL 43 05/09/2017    HDL 51 09/01/2015     Lab Results   Component Value Date    LDLCALC 80.2 05/09/2017    LDLCALC 61.0 (L) 09/01/2015     Lab Results   Component Value Date    TRIG 79 05/09/2017    TRIG 70 09/01/2015     Lab Results   Component Value Date    CHOLHDL 30.9 05/09/2017    CHOLHDL 40.5 09/01/2015       Chemistry        Component Value Date/Time     07/04/2019 1126    K 4.2 07/04/2019 1126     07/04/2019 1126    CO2 23 07/04/2019 1126    BUN 14 07/04/2019 1126    CREATININE 0.9 07/04/2019 1126    GLU 91 07/04/2019 1126        Component Value Date/Time    CALCIUM 9.2 07/04/2019 1126    ALKPHOS 60 07/04/2019 1126    AST 15 07/04/2019 1126    ALT 16 07/04/2019 1126    BILITOT 2.7 (H) 07/04/2019 1126    ESTGFRAFRICA >60 07/04/2019 1126    EGFRNONAA >60 " 07/04/2019 1126          Lab Results   Component Value Date    TSH 1.085 05/09/2017     No results found for: INR, PROTIME  Lab Results   Component Value Date    WBC 7.24 07/04/2019    HGB 13.4 (L) 07/04/2019    HCT 39.8 (L) 07/04/2019    MCV 88 07/04/2019     07/04/2019     BMP  Sodium   Date Value Ref Range Status   07/04/2019 139 136 - 145 mmol/L Final     Potassium   Date Value Ref Range Status   07/04/2019 4.2 3.5 - 5.1 mmol/L Final     Chloride   Date Value Ref Range Status   07/04/2019 105 95 - 110 mmol/L Final     CO2   Date Value Ref Range Status   07/04/2019 23 23 - 29 mmol/L Final     BUN, Bld   Date Value Ref Range Status   07/04/2019 14 6 - 20 mg/dL Final     Creatinine   Date Value Ref Range Status   07/04/2019 0.9 0.5 - 1.4 mg/dL Final     Calcium   Date Value Ref Range Status   07/04/2019 9.2 8.7 - 10.5 mg/dL Final     Anion Gap   Date Value Ref Range Status   07/04/2019 11 8 - 16 mmol/L Final     eGFR if    Date Value Ref Range Status   07/04/2019 >60 >60 mL/min/1.73 m^2 Final     eGFR if non    Date Value Ref Range Status   07/04/2019 >60 >60 mL/min/1.73 m^2 Final     Comment:     Calculation used to obtain the estimated glomerular filtration  rate (eGFR) is the CKD-EPI equation.        CrCl cannot be calculated (Patient's most recent lab result is older than the maximum 7 days allowed.).    Assessment:     1. Near syncope    2. YURI (obstructive sleep apnea)    3. Other chest pain    4. Abnormal echocardiogram    5. Family history of premature CAD      I think he has sleep apnea symptomatic   Needs check tsh  And  vit d   Need r/o arrythmias and assess his exercise tolerance with ett to see hr response.  holter needed to assess for bradyarrythmias.  Has abnormal septal motion by echo overall has normal lvf by my review. Due to his fh cad I am worried that eh juanita be having some cad issues would like to do ett and see if reproduce symptoms or arrythmias.   Has  coronary calcification on ct (fh premature cad)  Plan:   As per above   Get test done then decide on f/u.  Would add asa due to coronary calcification.

## 2019-08-07 NOTE — PROGRESS NOTES
Subjective:       Patient ID: Stephon Lynne is a 48 y.o. male.    Chief Complaint: He       Sleep Apnea (consult)    HPI Last two years - reduced indurance  history of Obstructive Sleep Apnea - no longer using Continuous Positive Airway Pressure   Fatigue and shortness of breath  Worse over the past year      Sleep Apnea  He presents for a sleep evaluation. He complains of snoring, periods of not breathing, decreased memory, decreased concentration, excessive daytime sleepiness, falling asleep while reading, watching television, awakening in the middle of the night because of jerking, feels sleepy during the day, take naps during the day.  Symptoms began 10 years ago, gradually worsening since that time.  He goes to sleep at 8-9 weekdays and 10-10:30 weekends. He awakens 5:30 weekdays and 7 weekends. He falls asleep in 30 minutes.  Collar size na. He denies knees buckling with laughing, completely or partially paralyzed while falling asleep or waking up. Previous evaluation and treatment has included PSG and PSG with C PAP titration.  EPWORTH 12  Chest pressure and discomfort    Right chest wall   Right shoulder discomfort -for 6 months  Just to left of right shoulder jemal      Past Medical History:   Diagnosis Date    Abnormal echocardiogram 8/7/2019    Acromegaly     Family history of premature CAD 8/7/2019    Hypogonadism in male     Near syncope 8/7/2019    Other chest pain 8/7/2019    Sleep apnea      Past Surgical History:   Procedure Laterality Date    EYE SURGERY      HERNIA REPAIR      hydrocele       Social History     Socioeconomic History    Marital status:      Spouse name: Not on file    Number of children: Not on file    Years of education: Not on file    Highest education level: Not on file   Occupational History    Not on file   Social Needs    Financial resource strain: Not on file    Food insecurity:     Worry: Not on file     Inability: Not on file     "Transportation needs:     Medical: Not on file     Non-medical: Not on file   Tobacco Use    Smoking status: Former Smoker     Packs/day: 1.50     Years: 12.00     Pack years: 18.00     Types: Cigarettes     Start date: 1990     Last attempt to quit: 2002     Years since quittin.6    Smokeless tobacco: Never Used   Substance and Sexual Activity    Alcohol use: Not Currently     Alcohol/week: 0.0 oz    Drug use: No    Sexual activity: Not on file   Lifestyle    Physical activity:     Days per week: Not on file     Minutes per session: Not on file    Stress: Not on file   Relationships    Social connections:     Talks on phone: Not on file     Gets together: Not on file     Attends Buddhist service: Not on file     Active member of club or organization: Not on file     Attends meetings of clubs or organizations: Not on file     Relationship status: Not on file   Other Topics Concern    Not on file   Social History Narrative    Not on file     Review of Systems   Constitutional: Positive for fatigue.   HENT: Negative.    Respiratory: Positive for apnea and shortness of breath.    Cardiovascular: Positive for chest pain.   Genitourinary: Negative.    Endocrine: endocrine negative   Musculoskeletal: Positive for arthralgias and back pain.   Skin: Negative.    Gastrointestinal: Negative.    Neurological: Negative.    Psychiatric/Behavioral: Positive for sleep disturbance.       Objective:      /77   Pulse 82   Resp 18   Ht 6' 2" (1.88 m)   Wt 102.8 kg (226 lb 10.1 oz)   SpO2 96%   BMI 29.10 kg/m²   Physical Exam   Constitutional: He is oriented to person, place, and time. He appears well-developed and well-nourished.   HENT:   Head: Normocephalic and atraumatic.   Eyes: Pupils are equal, round, and reactive to light. Conjunctivae are normal.   Neck: Neck supple. No JVD present. No tracheal deviation present. No thyromegaly present.   Cardiovascular: Normal rate, regular rhythm and " normal heart sounds.   Pulmonary/Chest: Effort normal. He has decreased breath sounds in the right lower field and the left lower field.   Abdominal: Soft.   Musculoskeletal: Normal range of motion.   Lymphadenopathy:     He has no cervical adenopathy.   Neurological: He is alert and oriented to person, place, and time.   Skin: Skin is warm and dry.   Nursing note and vitals reviewed.    Personal Diagnostic Review  CT of chest performed on 7/4/2019 with contrast revealed right upper lobe scar vs infiltrate.    CTA Chest Non-Coronary (PE Study)   Order: [id number]   Status:  Final result   Visible to patient:  Yes (Patient Portal) Next appt:  08/21/2019 at 02:10 PM in Radiology ([facility] CT1 LIMIT 500 LBS)   Details     Reading Physician Reading Date Result Priority   Zeeshan Sims Jr., MD 7/4/2019 STAT      Narrative     EXAMINATION:  CTA CHEST NON CORONARY    CLINICAL HISTORY:  Chest pain, normal ekg;    TECHNIQUE:  Axial CTA images performed through the chest after the administration of 100 cc intravenous contrast. Maximum intensity projections were performed and interpreted.    FINDINGS:  There is no evidence of pulmonary embolus. Pulmonary artery caliber is normal. The heart, great vessels, and mediastinal structures are within normal limits. No thoracic adenopathy.    In the patchy right upper lobe infiltrate.  No effusion or pneumothorax.    The upper abdominal visualized organs are within normal limits.    The bones are intact.      Impression       No pulmonary embolus.    Subtle patchy right upper lobe infiltrate.    All CT scan at this facility use dose modulation, iterative reconstruction, and/or weight base dosing when appropriate to reduce radiation dose to as low as reasonably achievable.      Electronically signed by: Zeeshan Sims Jr., MD  Date: 07/04/2019               NM Multi Pharm Stress Cardiac Component  Date of Procedure: 07/23/2019    PRE-TEST DATA   EKG: EKG demonstrates adequate. Resting  electrocardiogram reveals normal sinus rhythm with right bundle branch block at a rate of 67 bpm. LAFB, 1 av block. There was left atrial enlargement or conduction defect.     TEST DESCRIPTION   The patient received 0.4 mg of Regadenoson as an IV bolus. Peak heart rate was 114 bpm, which is 66% of the age predicted maximum heart rate. .     EKG Conclusions:    1. The EKG portion of this study is negative for ischemia at a peak heart rate of 114 bpm (66% of predicted).   2. Blood pressure remained stable throughout the protocol  (Presenting BP: 95/70 Peak BP: 113/71).   3. No significant arrhythmias were present.   4. There were no symptoms of chest discomfort or significant dyspnea throughout the protocol.     Nuclear Procedure:  Following a single isotope protocol, 9.6 mCi of Tc99 labeled Tetrofosmin was given at rest and tomographic imaging was performed. Regadenoson pharmacologic stress testing was performed as described above. Immediately following the IV bolus of   regadenoson, 27.7 mCi of Tc99 labeled Tetrofosmin was given and tomographic imaging was performed. The site of the IV injection was the right hand.     Comments:  This is a technically adequate study. Inspection of the transaxial images demonstrated no significant cranial, caudal, or lateral patient motion in the camera between rest and stress acquisitions. There is homogeneous uptake of radiotracer in all walls   of the myocardium on stress and rest images. The extracardiac distribution of radioactivity is normal. The left ventricular cavity is normal in size and does not increase with stress. On gated SPECT, left ventricular motion is normal at rest.     Nuclear Quantitative Functional Analysis:   LVEF: 65 %    Impression: NORMAL MYOCARDIAL PERFUSION  1. The perfusion scan is free of evidence for myocardial ischemia or injury.   2. Resting wall motion is physiologic.   3. Resting LV function is normal.   4. The ventricular volumes are normal at  rest and stress.   5. The extracardiac distribution of radioactivity is normal.     This document has been electronically    SIGNED BY: Fredi Mckeon MD On: 07/23/2019 16:25  NM Myocardial Perfusion Spect Multi Exer  See Procedure Notes for results.     IMPRESSION: Please see Cardiology procedure notes for report      This procedure was auto-finalized by: Virtual Radiologist  2D echo with color flow doppler  Date of Procedure: 07/23/2019    TEST DESCRIPTION   Technical Quality: This is a technically adequate study.     Aorta: The aortic root is normal in size, measuring 3.1 cm at sinotubular junction and 3.2 cm at Sinuses of Valsalva. The proximal ascending aorta is upper limit of normal, measuring 3.5 cm across.     Left Atrium: The left atrial volume index is normal, measuring 12.63 cc/m2.     Left Ventricle: The left ventricle is normal in size, with an end-diastolic diameter of 4.8 cm, and an end-systolic diameter of 3.4 cm. Septal wall thickness is increased, with the septum measuring 1.6 cm and the posterior wall measuring 1.1 cm across.   Relative wall thickness was increased at 0.46, and the LV mass index was increased at 139.1 g/m2 consistent with concentric left ventricular hypertrophy. The following segments were moderately hypokinetic: mid anteroseptum, basal anteroseptum, basal   inferoseptum.  Left ventricular systolic function appears mildly depressed. Visually estimated ejection fraction is 45-50%. The LV Doppler derived stroke volume equals 69.0 ccs.     Diastolic indices: E wave velocity 0.7 m/s, E/A ratio 1.4,  msec., E/e' ratio(avg) 5. There is diastolic dysfunction secondary to relaxation abnormality.     Right Atrium: The right atrium is normal in size, measuring 4.2 cm in length and 2.7 cm in width in the apical view.     Right Ventricle: The right ventricle is normal in size measuring 2.5 cm at the base in the apical right ventricle-focused view. Global right ventricular systolic  function appears normal. Tricuspid annular plane systolic excursion (TAPSE) is 2.4 cm. The   estimated PA systolic pressure is 28 mmHg.     Aortic Valve:  Aortic valve is normal in structure with normal leaflet mobility.     Mitral Valve:  Mitral valve is normal in structure with normal leaflet mobility.     Tricuspid Valve:  Tricuspid valve is normal in structure with normal leaflet mobility.     Pulmonary Valve:  Pulmonary valve is normal in structure with normal leaflet mobility.     IVC: IVC is normal in size and collapses > 50% with a sniff, suggesting normal right atrial pressure of 3 mmHg.     Intracavitary: There is no evidence of pericardial effusion, intracavity mass, thrombi, or vegetation.     CONCLUSIONS     1 - Wall motion abnormalities.     2 - Mildly depressed left ventricular systolic function (EF 45-50%).     3 - Impaired LV relaxation, normal LAP (grade 1 diastolic dysfunction).     4 - Normal right ventricular systolic function .     5 - Concentric hypertrophy.     6 - The estimated PA systolic pressure is 28 mmHg.     This document has been electronically    SIGNED BY: Fredi Mckeon MD On: 07/23/2019 11:35      Office Spirometry Results:     No flowsheet data found.  Pulmonary Studies Review 8/7/2019   SpO2 96   Height 74.000   Weight 3626.13   BMI (Calculated) 29.2   Predicted Distance 503.95   Predicted Distance Meters (Calculated) 702.31     ECHOCARDIGRAM  CONCLUSIONS     1 - Wall motion abnormalities.     2 - Mildly depressed left ventricular systolic function (EF 45-50%).     3 - Impaired LV relaxation, normal LAP (grade 1 diastolic dysfunction).     4 - Normal right ventricular systolic function .     5 - Concentric hypertrophy.     6 - The estimated PA systolic pressure is 28 mmHg.             This document has been electronically    SIGNED BY: Fredi Mckeon MD On: 07/23/2019 11:35      Assessment:       Abnormal CT of the chest - right apical scarringe    YURI (obstructive sleep apnea)  -      Home Sleep Studies; Future; Expected date: 08/07/2019    Chest wall pain  -     CT Chest Without Contrast; Future; Expected date: 08/07/2019    Dyspnea on exertion  -     CT Chest Without Contrast; Future; Expected date: 08/07/2019          Outpatient Encounter Medications as of 8/7/2019   Medication Sig Dispense Refill    cyanocobalamin, vitamin B-12, (VITAMIN B-12) 1,000 mcg TbSR Take 1 tablet by mouth once daily.      ondansetron (ZOFRAN-ODT) 4 MG TbDL Take 1 tablet (4 mg total) by mouth every 6 (six) hours as needed. 20 tablet 0    DON'S WORT ORAL Take 1 tablet by mouth once daily.        No facility-administered encounter medications on file as of 8/7/2019.      Plan:       Requested Prescriptions      No prescriptions requested or ordered in this encounter     Problem List Items Addressed This Visit     YURI (obstructive sleep apnea)    Relevant Orders    Home Sleep Studies      Other Visit Diagnoses     Abnormal CT of the chest - right apical scarringe    -  Primary    Chest wall pain        Relevant Orders    CT Chest Without Contrast    Dyspnea on exertion        Relevant Orders    CT Chest Without Contrast             Follow up in about 3 weeks (around 8/28/2019) for Release Medical Records - Sleep Study.    MEDICAL DECISION MAKING: Moderate to high complexity.  Overall, the multiple problems listed are of moderate to high severity that may impact quality of life and activities of daily living. Side effects of medications, treatment plan as well as options and alternatives reviewed and discussed with patient. There was counseling of patient concerning these issues.    Total time spent in face to face counseling and coordination of care - 60  minutes over 50% of time was used in discussion of prognosis, risks, benefits of treatment, instructions and compliance with regimen . Discussion with other physicians or health care providers (DME, NP, pharmacy, respiratory therapy) occurred.

## 2019-08-08 ENCOUNTER — TELEPHONE (OUTPATIENT)
Dept: PULMONOLOGY | Facility: CLINIC | Age: 49
End: 2019-08-08

## 2019-08-08 LAB
25(OH)D3+25(OH)D2 SERPL-MCNC: 33 NG/ML (ref 30–96)
TSH SERPL DL<=0.005 MIU/L-ACNC: 1.38 UIU/ML (ref 0.4–4)

## 2019-08-16 ENCOUNTER — PATIENT MESSAGE (OUTPATIENT)
Dept: CARDIOLOGY | Facility: CLINIC | Age: 49
End: 2019-08-16

## 2019-09-11 ENCOUNTER — OFFICE VISIT (OUTPATIENT)
Dept: PULMONOLOGY | Facility: CLINIC | Age: 49
End: 2019-09-11
Payer: COMMERCIAL

## 2019-09-11 ENCOUNTER — HOSPITAL ENCOUNTER (OUTPATIENT)
Dept: RADIOLOGY | Facility: HOSPITAL | Age: 49
Discharge: HOME | End: 2019-09-11
Attending: INTERNAL MEDICINE
Payer: COMMERCIAL

## 2019-09-11 VITALS
DIASTOLIC BLOOD PRESSURE: 68 MMHG | OXYGEN SATURATION: 97 % | BODY MASS INDEX: 28.68 KG/M2 | WEIGHT: 230.63 LBS | HEIGHT: 75 IN | SYSTOLIC BLOOD PRESSURE: 94 MMHG | HEART RATE: 75 BPM | RESPIRATION RATE: 18 BRPM

## 2019-09-11 DIAGNOSIS — G47.33 OSA (OBSTRUCTIVE SLEEP APNEA): ICD-10-CM

## 2019-09-11 DIAGNOSIS — R07.89 CHEST WALL PAIN: ICD-10-CM

## 2019-09-11 DIAGNOSIS — J40 BRONCHITIS: Primary | ICD-10-CM

## 2019-09-11 DIAGNOSIS — R06.09 DYSPNEA ON EXERTION: ICD-10-CM

## 2019-09-11 DIAGNOSIS — R91.1 LUNG NODULE: ICD-10-CM

## 2019-09-11 PROCEDURE — 3008F PR BODY MASS INDEX (BMI) DOCUMENTED: ICD-10-PCS | Mod: CPTII,S$GLB,, | Performed by: INTERNAL MEDICINE

## 2019-09-11 PROCEDURE — 90686 FLU VACCINE (QUAD) GREATER THAN OR EQUAL TO 3YO PRESERVATIVE FREE IM: ICD-10-PCS | Mod: S$GLB,,, | Performed by: INTERNAL MEDICINE

## 2019-09-11 PROCEDURE — 3008F BODY MASS INDEX DOCD: CPT | Mod: CPTII,S$GLB,, | Performed by: INTERNAL MEDICINE

## 2019-09-11 PROCEDURE — 71250 CT CHEST WITHOUT CONTRAST: ICD-10-PCS | Mod: 26,,, | Performed by: RADIOLOGY

## 2019-09-11 PROCEDURE — 90472 IMMUNIZATION ADMIN EACH ADD: CPT | Mod: S$GLB,,, | Performed by: INTERNAL MEDICINE

## 2019-09-11 PROCEDURE — 71250 CT THORAX DX C-: CPT | Mod: TC

## 2019-09-11 PROCEDURE — 90732 PNEUMOCOCCAL POLYSACCHARIDE VACCINE 23-VALENT =>2YO SQ IM: ICD-10-PCS | Mod: S$GLB,,, | Performed by: INTERNAL MEDICINE

## 2019-09-11 PROCEDURE — 90686 IIV4 VACC NO PRSV 0.5 ML IM: CPT | Mod: S$GLB,,, | Performed by: INTERNAL MEDICINE

## 2019-09-11 PROCEDURE — 90472 PNEUMOCOCCAL POLYSACCHARIDE VACCINE 23-VALENT =>2YO SQ IM: ICD-10-PCS | Mod: S$GLB,,, | Performed by: INTERNAL MEDICINE

## 2019-09-11 PROCEDURE — 71250 CT THORAX DX C-: CPT | Mod: 26,,, | Performed by: RADIOLOGY

## 2019-09-11 PROCEDURE — 99215 PR OFFICE/OUTPT VISIT, EST, LEVL V, 40-54 MIN: ICD-10-PCS | Mod: 25,S$GLB,, | Performed by: INTERNAL MEDICINE

## 2019-09-11 PROCEDURE — 99999 PR PBB SHADOW E&M-EST. PATIENT-LVL III: ICD-10-PCS | Mod: PBBFAC,,, | Performed by: INTERNAL MEDICINE

## 2019-09-11 PROCEDURE — 90471 IMMUNIZATION ADMIN: CPT | Mod: S$GLB,,, | Performed by: INTERNAL MEDICINE

## 2019-09-11 PROCEDURE — 99999 PR PBB SHADOW E&M-EST. PATIENT-LVL III: CPT | Mod: PBBFAC,,, | Performed by: INTERNAL MEDICINE

## 2019-09-11 PROCEDURE — 90732 PPSV23 VACC 2 YRS+ SUBQ/IM: CPT | Mod: S$GLB,,, | Performed by: INTERNAL MEDICINE

## 2019-09-11 PROCEDURE — 99215 OFFICE O/P EST HI 40 MIN: CPT | Mod: 25,S$GLB,, | Performed by: INTERNAL MEDICINE

## 2019-09-11 PROCEDURE — 90471 FLU VACCINE (QUAD) GREATER THAN OR EQUAL TO 3YO PRESERVATIVE FREE IM: ICD-10-PCS | Mod: S$GLB,,, | Performed by: INTERNAL MEDICINE

## 2019-09-11 NOTE — PROGRESS NOTES
Subjective:       Patient ID: Stephon Lynne is a 48 y.o. male.    Chief Complaint: He       Sleep Apnea    HPI   Follow-up for an abnormal chest x-ray with the appearance of apical pleural scarring.  Abnormal Chest X Ray - pulmonary sequestration    Since his last office visit reports that tightness in his chest has improved his fatigue has improved.  He has no complaints of cough or sputum production.  TIghtness I chest and fatigue  Radiation pain in the right shoulder 2-3 times a week for 1 hour or more. About the same to improved      Sleep Apnea  Sleep Apnea  He presents for a sleep evaluation. He complains of snoring, periods of not breathing, decreased memory, decreased concentration, excessive daytime sleepiness, falling asleep while reading, watching television, awakening in the middle of the night because of jerking, feels sleepy during the day, take naps during the day.  Symptoms began 10 years ago, gradually worsening since that time.  He goes to sleep at 8-9 weekdays and 10-10:30 weekends. He awakens 5:30 weekdays and 7 weekends. He falls asleep in 30 minutes.  Collar size na. He denies knees buckling with laughing, completely or partially paralyzed while falling asleep or waking up. Previous evaluation and treatment has included PSG and PSG with C PAP titration.  EPWORTH 12        Past Medical History:   Diagnosis Date    Abnormal echocardiogram 8/7/2019    Acromegaly     Family history of premature CAD 8/7/2019    Hypogonadism in male     Near syncope 8/7/2019    Other chest pain 8/7/2019    Sleep apnea      Past Surgical History:   Procedure Laterality Date    EYE SURGERY      HERNIA REPAIR      hydrocele       Social History     Socioeconomic History    Marital status:      Spouse name: Not on file    Number of children: Not on file    Years of education: Not on file    Highest education level: Not on file   Occupational History    Not on file   Social Needs    Financial  resource strain: Not on file    Food insecurity:     Worry: Not on file     Inability: Not on file    Transportation needs:     Medical: Not on file     Non-medical: Not on file   Tobacco Use    Smoking status: Former Smoker     Packs/day: 1.50     Years: 12.00     Pack years: 18.00     Types: Cigarettes     Start date: 1990     Last attempt to quit: 2002     Years since quittin.7    Smokeless tobacco: Never Used   Substance and Sexual Activity    Alcohol use: Not Currently     Alcohol/week: 0.0 oz    Drug use: No    Sexual activity: Not on file   Lifestyle    Physical activity:     Days per week: Not on file     Minutes per session: Not on file    Stress: Not on file   Relationships    Social connections:     Talks on phone: Not on file     Gets together: Not on file     Attends Hindu service: Not on file     Active member of club or organization: Not on file     Attends meetings of clubs or organizations: Not on file     Relationship status: Not on file   Other Topics Concern    Not on file   Social History Narrative    Not on file     Review of Systems   Constitutional: Positive for fatigue. Negative for fever.   HENT: Positive for postnasal drip, rhinorrhea and congestion.    Eyes: Negative for redness and itching.   Respiratory: Positive for cough, sputum production, shortness of breath, dyspnea on extertion, use of rescue inhaler and Paroxysmal Nocturnal Dyspnea.    Cardiovascular: Positive for chest pain. Negative for palpitations and leg swelling.   Genitourinary: Negative for difficulty urinating and hematuria.   Endocrine: Negative for cold intolerance and heat intolerance.    Skin: Negative for rash.   Gastrointestinal: Negative for nausea and abdominal pain.   Neurological: Negative for dizziness, syncope, weakness and light-headedness.   Hematological: Negative for adenopathy. Does not bruise/bleed easily.   Psychiatric/Behavioral: Negative for sleep disturbance. The patient  "is not nervous/anxious.        Objective:      BP 94/68   Pulse 75   Resp 18   Ht 6' 3" (1.905 m)   Wt 104.6 kg (230 lb 9.6 oz)   SpO2 97%   BMI 28.82 kg/m²   Physical Exam   Constitutional: He is oriented to person, place, and time. He appears well-developed and well-nourished.   HENT:   Head: Normocephalic and atraumatic.   Eyes: Pupils are equal, round, and reactive to light. Conjunctivae are normal.   Neck: Neck supple. No JVD present. No tracheal deviation present. No thyromegaly present.   Cardiovascular: Normal rate, regular rhythm and normal heart sounds.   Pulmonary/Chest: Effort normal and breath sounds normal.   Abdominal: Soft.   Musculoskeletal: Normal range of motion.   Lymphadenopathy:     He has no cervical adenopathy.   Neurological: He is alert and oriented to person, place, and time.   Skin: Skin is warm and dry.   Nursing note and vitals reviewed.    Personal Diagnostic Review  CT of chest performed on 9/11/2019 without contrast revealed clearing of pulmonary infiltrates.  Cardiac treadmill stress test  Date of Procedure: 09/18/2019    PRE-TEST DATA   EKG: Resting electrocardiogram reveals normal sinus rhythm at a rate of 83 bpm.     TEST DESCRIPTION   The patient exercised for 9.62 minutes on a Lalo protocol, corresponding to a functional capacity of 11 estimated METS, achieving a peak heart rate of 162 bpm, which is 93% of the age predicted maximum heart rate. The patient discontinued exercise   secondary to chest pain.     There were no significant electrocardiographic changes throughout the protocol suggesting ischemia. patient had chest pain in the second  stage at hr 120 bpm resolved 2 minutes into recovery    EKG Conclusions:    1. The EKG portion of this study is negative for ischemia at a moderate workload, and peak heart rate of 162 bpm (93% of predicted).   2. Blood pressure response to exercise was normal (Presenting BP: 101/78 Peak BP: 158/84).   3. No significant arrhythmias " were present.   4. There were no symptoms of chest discomfort or significant dyspnea throughout the protocol.   5. The Jasso treadmill score was 10 suggesting a low probability for future cardiovascular events.  patient had a clinically positive stress test    This document has been electronically    SIGNED BY: Marilyn Seaman MD On: 09/18/2019 21:25      Office Spirometry Results:     No flowsheet data found.  Pulmonary Studies Review 9/11/2019   SpO2 97   Height 75.000   Weight 3689.62   BMI (Calculated) 28.9   Predicted Distance 505.63   Predicted Distance Meters (Calculated) 718.07         Assessment:       Bronchitis  -     albuterol sulfate (PROAIR RESPICLICK) 90 mcg/actuation AePB; Inhale 180 mcg into the lungs every 4 (four) hours. Rescue  Dispense: 1 each; Refill: 11  -     (In Office Administered) Pneumococcal Polysaccharide Vaccine (23 Valent) (SQ/IM)    Lung nodule  -     CT Chest Without Contrast; Future; Expected date: 09/11/2019    YURI (obstructive sleep apnea)  -     Home Sleep Studies; Future; Expected date: 09/11/2019    Other orders  -     Influenza - Quadrivalent (3 years & older) (PF)          Outpatient Encounter Medications as of 9/11/2019   Medication Sig Dispense Refill    cyanocobalamin, vitamin B-12, (VITAMIN B-12) 1,000 mcg TbSR Take 1 tablet by mouth once daily.      ondansetron (ZOFRAN-ODT) 4 MG TbDL Take 1 tablet (4 mg total) by mouth every 6 (six) hours as needed. 20 tablet 0    DON'S WORT ORAL Take 1 tablet by mouth once daily.       albuterol sulfate (PROAIR RESPICLICK) 90 mcg/actuation AePB Inhale 180 mcg into the lungs every 4 (four) hours. Rescue 1 each 11     No facility-administered encounter medications on file as of 9/11/2019.      Plan:       Requested Prescriptions     Signed Prescriptions Disp Refills    albuterol sulfate (PROAIR RESPICLICK) 90 mcg/actuation AePB 1 each 11     Sig: Inhale 180 mcg into the lungs every 4 (four) hours. Rescue     Problem List Items  Addressed This Visit     YURI (obstructive sleep apnea)    Relevant Orders    Home Sleep Studies      Other Visit Diagnoses     Bronchitis    -  Primary    Relevant Medications    albuterol sulfate (PROAIR RESPICLICK) 90 mcg/actuation AePB    Other Relevant Orders    (In Office Administered) Pneumococcal Polysaccharide Vaccine (23 Valent) (SQ/IM) (Completed)    Lung nodule        Relevant Orders    CT Chest Without Contrast             Follow up in about 1 year (around 9/11/2020) for Review CT/PET day of visit.    MEDICAL DECISION MAKING: Moderate to high complexity.  Overall, the multiple problems listed are of moderate to high severity that may impact quality of life and activities of daily living. Side effects of medications, treatment plan as well as options and alternatives reviewed and discussed with patient. There was counseling of patient concerning these issues.    Total time spent in face to face counseling and coordination of care - 45  minutes over 50% of time was used in discussion of prognosis, risks, benefits of treatment, instructions and compliance with regimen . Discussion with other physicians or health care providers (DME, NP, pharmacy, respiratory therapy) occurred.

## 2019-09-11 NOTE — PATIENT INSTRUCTIONS
[facility] call [facility] to schedule sleep study at  [phone] . Usually take 1- 2 days to get insurance company approval.  You will need to schedule at follow up clinic appointment 7 days after the sleep study to review the results.

## 2019-09-12 ENCOUNTER — TELEPHONE (OUTPATIENT)
Dept: PULMONOLOGY | Facility: HOSPITAL | Age: 49
End: 2019-09-12

## 2019-09-18 ENCOUNTER — CLINICAL SUPPORT (OUTPATIENT)
Dept: CARDIOLOGY | Facility: CLINIC | Age: 49
End: 2019-09-18
Attending: INTERNAL MEDICINE
Payer: COMMERCIAL

## 2019-09-18 ENCOUNTER — PROCEDURE VISIT (OUTPATIENT)
Dept: SLEEP MEDICINE | Facility: CLINIC | Age: 49
End: 2019-09-18
Payer: COMMERCIAL

## 2019-09-18 DIAGNOSIS — G47.33 OSA (OBSTRUCTIVE SLEEP APNEA): Primary | ICD-10-CM

## 2019-09-18 DIAGNOSIS — R55 NEAR SYNCOPE: ICD-10-CM

## 2019-09-18 DIAGNOSIS — Z82.49 FAMILY HISTORY OF PREMATURE CAD: ICD-10-CM

## 2019-09-18 DIAGNOSIS — R93.1 ABNORMAL ECHOCARDIOGRAM: ICD-10-CM

## 2019-09-18 LAB — DIASTOLIC DYSFUNCTION: NO

## 2019-09-18 PROCEDURE — 99499 NO LOS: ICD-10-PCS | Mod: S$GLB,,, | Performed by: INTERNAL MEDICINE

## 2019-09-18 PROCEDURE — 93015 CV STRESS TEST SUPVJ I&R: CPT | Mod: S$GLB,,, | Performed by: INTERNAL MEDICINE

## 2019-09-18 PROCEDURE — 93015 CARDIAC TREADMILL STRESS TEST: ICD-10-PCS | Mod: S$GLB,,, | Performed by: INTERNAL MEDICINE

## 2019-09-18 PROCEDURE — 99499 UNLISTED E&M SERVICE: CPT | Mod: S$GLB,,, | Performed by: INTERNAL MEDICINE

## 2019-09-18 PROCEDURE — 95800 SLP STDY UNATTENDED: CPT

## 2019-09-18 NOTE — Clinical Note
PHYSICIAN INTERPRETATION AND COMMENTS: Findings are consistent with moderate, non-positional obstructivesleep apnea (YURI). Overall AHI was 18.0 events per hour. Minimum oxygen saturation during study was 81.5%. Loud snoringrecorded. Treatment is indicated. Intervention with CPAP. Auto PAP 5-20 cm water pressure with suitable mask. Closefollow-up to ensure resolution of symptoms.CLINICAL HISTORY: 48 year old male presented with: Snoring, daytime sleepiness. Previous evaluation for obstructivesleep apnea. 18.5 inch neck, BMI of 27, an [place] sleepiness score of 4, history of a previous diagnosis of YURI andsymptoms of nocturnal snoring, waking up choking and witnessed apneas. Based on the clinical history, the patient has a highpre-test probability of having severe YURI.SLEEP STUDY FINDINGS: Patient underwent a one night Home Sleep Test and by behavioral criteria, slept forapproximately 5.2 hours, with a sleep latency of 5 minutes and a sleep efficiency of 90.4%. Moderate sleep disorderedbrea

## 2019-09-19 ENCOUNTER — TELEPHONE (OUTPATIENT)
Dept: PULMONOLOGY | Facility: CLINIC | Age: 49
End: 2019-09-19

## 2019-09-19 ENCOUNTER — TELEPHONE (OUTPATIENT)
Dept: CARDIOLOGY | Facility: CLINIC | Age: 49
End: 2019-09-19

## 2019-09-19 NOTE — TELEPHONE ENCOUNTER
----- Message from Marilyn Seaman MD sent at 9/18/2019  8:47 PM CDT -----  Has few skipped beats but no significant arrythmias.

## 2019-09-19 NOTE — TELEPHONE ENCOUNTER
----- Message from Alma Mckeon sent at 9/19/2019 10:07 AM CDT -----  Contact: Wife Mrs LynneOfihpj-[phone]  Would like to consult with the nurse, Wife would like the Patient to be work in for a sooner Appt, Please call back at [phone], Thanks sj  .Type:  Sooner Apoointment Request    Caller is requesting a sooner appointment.  Caller declined first available appointment listed below.  Caller will not accept being placed on the waitlist and is requesting a message be sent to doctor.  Name of Caller: Ms Lynne  When is the first available appointment?Oct 7, 2019  Symptoms:Follow Up sleep study  Would the patient rather a call back or a response via [facility]? Call back  Best Call Back Number:[phone]  Additional Information:

## 2019-09-19 NOTE — TELEPHONE ENCOUNTER
----- Message from Marilyn Seaman MD sent at 9/19/2019  8:56 AM CDT -----  Needs f/u visit to discuss findings

## 2019-09-20 ENCOUNTER — OFFICE VISIT (OUTPATIENT)
Dept: CARDIOLOGY | Facility: CLINIC | Age: 49
End: 2019-09-20
Payer: COMMERCIAL

## 2019-09-20 ENCOUNTER — LAB VISIT (OUTPATIENT)
Dept: LAB | Facility: HOSPITAL | Age: 49
End: 2019-09-20
Attending: INTERNAL MEDICINE
Payer: COMMERCIAL

## 2019-09-20 VITALS
HEART RATE: 76 BPM | DIASTOLIC BLOOD PRESSURE: 70 MMHG | WEIGHT: 232.38 LBS | SYSTOLIC BLOOD PRESSURE: 100 MMHG | HEIGHT: 75 IN | BODY MASS INDEX: 28.89 KG/M2

## 2019-09-20 DIAGNOSIS — Z82.49 FAMILY HISTORY OF PREMATURE CAD: ICD-10-CM

## 2019-09-20 DIAGNOSIS — R94.39 ABNORMAL STRESS TEST: Primary | ICD-10-CM

## 2019-09-20 DIAGNOSIS — R94.39 ABNORMAL STRESS TEST: ICD-10-CM

## 2019-09-20 DIAGNOSIS — R07.89 OTHER CHEST PAIN: ICD-10-CM

## 2019-09-20 DIAGNOSIS — R93.1 ABNORMAL ECHOCARDIOGRAM: ICD-10-CM

## 2019-09-20 DIAGNOSIS — G47.33 OSA (OBSTRUCTIVE SLEEP APNEA): ICD-10-CM

## 2019-09-20 LAB
APTT BLDCRRT: 31 SEC (ref 21–32)
INR PPP: 1 (ref 0.8–1.2)
PROTHROMBIN TIME: 10.9 SEC (ref 9–12.5)

## 2019-09-20 PROCEDURE — 99214 OFFICE O/P EST MOD 30 MIN: CPT | Mod: S$GLB,,, | Performed by: INTERNAL MEDICINE

## 2019-09-20 PROCEDURE — 36415 COLL VENOUS BLD VENIPUNCTURE: CPT

## 2019-09-20 PROCEDURE — 85610 PROTHROMBIN TIME: CPT

## 2019-09-20 PROCEDURE — 80048 BASIC METABOLIC PNL TOTAL CA: CPT

## 2019-09-20 PROCEDURE — 95800 PR SLEEP STUDY, UNATTENDED, RECORD HEART RATE/O2 SAT/RESP ANAL/SLEEP TIME: ICD-10-PCS | Mod: 26,52,, | Performed by: INTERNAL MEDICINE

## 2019-09-20 PROCEDURE — 3008F PR BODY MASS INDEX (BMI) DOCUMENTED: ICD-10-PCS | Mod: CPTII,S$GLB,, | Performed by: INTERNAL MEDICINE

## 2019-09-20 PROCEDURE — 3008F BODY MASS INDEX DOCD: CPT | Mod: CPTII,S$GLB,, | Performed by: INTERNAL MEDICINE

## 2019-09-20 PROCEDURE — 99999 PR PBB SHADOW E&M-EST. PATIENT-LVL III: CPT | Mod: PBBFAC,,, | Performed by: INTERNAL MEDICINE

## 2019-09-20 PROCEDURE — 95800 SLP STDY UNATTENDED: CPT | Mod: 26,52,, | Performed by: INTERNAL MEDICINE

## 2019-09-20 PROCEDURE — 99214 PR OFFICE/OUTPT VISIT, EST, LEVL IV, 30-39 MIN: ICD-10-PCS | Mod: S$GLB,,, | Performed by: INTERNAL MEDICINE

## 2019-09-20 PROCEDURE — 99999 PR PBB SHADOW E&M-EST. PATIENT-LVL III: ICD-10-PCS | Mod: PBBFAC,,, | Performed by: INTERNAL MEDICINE

## 2019-09-20 PROCEDURE — 85730 THROMBOPLASTIN TIME PARTIAL: CPT

## 2019-09-20 PROCEDURE — 85025 COMPLETE CBC W/AUTO DIFF WBC: CPT

## 2019-09-20 RX ORDER — ASPIRIN 81 MG/1
81 TABLET ORAL DAILY
COMMUNITY

## 2019-09-20 NOTE — PROGRESS NOTES
Subjective:   Patient ID:  Stephon Lynne is a 48 y.o. male who presents for follow up of Follow-up and Dizziness (with exertion)      HPI  A 49 yo male with recurrent episodes exertional shortness of breath and chest pain exertional in nature for which w/u was negative however he had an ett that was positive by reproduction chest pain that resolved 3 minutes into recovery. He ahs no st depression but that reproduced the symptoms he is complaining of,No results found for this visit on 19. Pain. He has minimal coronary calcification by ct scan.  Past Medical History:   Diagnosis Date    Abnormal echocardiogram 2019    Acromegaly     Family history of premature CAD 2019    Hypogonadism in male     Near syncope 2019    Other chest pain 2019    Sleep apnea        Past Surgical History:   Procedure Laterality Date    EYE SURGERY      HERNIA REPAIR      hydrocele         Social History     Tobacco Use    Smoking status: Former Smoker     Packs/day: 1.50     Years: 12.00     Pack years: 18.00     Types: Cigarettes     Start date: 1990     Last attempt to quit: 2002     Years since quittin.7    Smokeless tobacco: Never Used   Substance Use Topics    Alcohol use: Not Currently     Alcohol/week: 0.0 oz    Drug use: No       Family History   Problem Relation Age of Onset    Aortic aneurysm Mother     Hyperlipidemia Father     Pulmonary embolism Sister     Heart attack Brother     Aortic aneurysm Sister        Current Outpatient Medications   Medication Sig    albuterol sulfate (PROAIR RESPICLICK) 90 mcg/actuation AePB Inhale 180 mcg into the lungs every 4 (four) hours. Rescue    cyanocobalamin, vitamin B-12, (VITAMIN B-12) 1,000 mcg TbSR Take 1 tablet by mouth once daily.    ondansetron (ZOFRAN-ODT) 4 MG TbDL Take 1 tablet (4 mg total) by mouth every 6 (six) hours as needed.    DON'S WORT ORAL Take 1 tablet by mouth once daily.      No current  facility-administered medications for this visit.      Current Outpatient Medications on File Prior to Visit   Medication Sig    albuterol sulfate (PROAIR RESPICLICK) 90 mcg/actuation AePB Inhale 180 mcg into the lungs every 4 (four) hours. Rescue    cyanocobalamin, vitamin B-12, (VITAMIN B-12) 1,000 mcg TbSR Take 1 tablet by mouth once daily.    ondansetron (ZOFRAN-ODT) 4 MG TbDL Take 1 tablet (4 mg total) by mouth every 6 (six) hours as needed.    DON'S WORT ORAL Take 1 tablet by mouth once daily.      No current facility-administered medications on file prior to visit.      Review of patient's allergies indicates:  No Known Allergies  ROS  Constitution: Positive for malaise/fatigue.   Eyes: Negative for blurred vision.   Cardiovascular: Positive for chest pain and dyspnea on exertion. Negative for claudication, cyanosis, irregular heartbeat, leg swelling, near-syncope, orthopnea, palpitations and paroxysmal nocturnal dyspnea.   Respiratory: Positive for shortness of breath, sleep disturbances due to breathing and snoring. Negative for cough and hemoptysis.    Hematologic/Lymphatic: Negative for bleeding problem. Does not bruise/bleed easily.   Skin: Negative for dry skin and itching.   Musculoskeletal: Negative for falls, muscle weakness and myalgias.   Gastrointestinal: Positive for nausea. Negative for abdominal pain, diarrhea, heartburn, hematemesis, hematochezia and melena.   Genitourinary: Negative for flank pain and hematuria.   Neurological: Positive for weakness. Negative for dizziness, focal weakness, headaches, light-headedness, numbness, paresthesias and seizures.   Psychiatric/Behavioral: Negative for altered mental status and memory loss. The patient is not nervous/anxious.    Allergic/Immunologic: Negative for hives.   Objective:   Physical Exam  Vitals:    09/20/19 1545 09/20/19 1547   BP: 112/68 100/70   BP Location: Right arm Left arm   Patient Position: Sitting    Pulse: 76    Weight:  "105.4 kg (232 lb 5.8 oz)    Height: 6' 3" (1.905 m)    Constitutional: He is oriented to person, place, and time. He appears well-developed and well-nourished. No distress.   HENT:   Head: Normocephalic and atraumatic.   Eyes: Pupils are equal, round, and reactive to light. EOM are normal. Right eye exhibits no discharge. Left eye exhibits no discharge.   Neck: Neck supple. No JVD present. No thyromegaly present.   Cardiovascular: Normal rate, regular rhythm, normal heart sounds and intact distal pulses. Exam reveals no gallop and no friction rub.   No murmur heard.  Pulmonary/Chest: Effort normal and breath sounds normal. No respiratory distress. He has no wheezes. He has no rales. He exhibits no tenderness.   Pectus excavatum noted.   Abdominal: Soft. Bowel sounds are normal. He exhibits no distension. There is no tenderness.   Musculoskeletal: Normal range of motion. He exhibits no edema.   Neurological: He is alert and oriented to person, place, and time. No cranial nerve deficit.   Skin: Skin is warm and dry. No rash noted. He is not diaphoretic. No erythema.   Psychiatric: He has a normal mood and affect. His behavior is normal.   Nursing note and vitals reviewed.  Lab Results   Component Value Date    CHOL 139 05/09/2017    CHOL 126 09/01/2015     Lab Results   Component Value Date    HDL 43 05/09/2017    HDL 51 09/01/2015     Lab Results   Component Value Date    LDLCALC 80.2 05/09/2017    LDLCALC 61.0 (L) 09/01/2015     Lab Results   Component Value Date    TRIG 79 05/09/2017    TRIG 70 09/01/2015     Lab Results   Component Value Date    CHOLHDL 30.9 05/09/2017    CHOLHDL 40.5 09/01/2015       Chemistry        Component Value Date/Time     07/04/2019 1126    K 4.2 07/04/2019 1126     07/04/2019 1126    CO2 23 07/04/2019 1126    BUN 14 07/04/2019 1126    CREATININE 0.9 07/04/2019 1126    GLU 91 07/04/2019 1126        Component Value Date/Time    CALCIUM 9.2 07/04/2019 1126    ALKPHOS 60 " 07/04/2019 1126    AST 15 07/04/2019 1126    ALT 16 07/04/2019 1126    BILITOT 2.7 (H) 07/04/2019 1126    ESTGFRAFRICA >60 07/04/2019 1126    EGFRNONAA >60 07/04/2019 1126          Lab Results   Component Value Date    TSH 1.376 08/07/2019     No results found for: INR, PROTIME  Lab Results   Component Value Date    WBC 7.24 07/04/2019    HGB 13.4 (L) 07/04/2019    HCT 39.8 (L) 07/04/2019    MCV 88 07/04/2019     07/04/2019     BMP  Sodium   Date Value Ref Range Status   07/04/2019 139 136 - 145 mmol/L Final     Potassium   Date Value Ref Range Status   07/04/2019 4.2 3.5 - 5.1 mmol/L Final     Chloride   Date Value Ref Range Status   07/04/2019 105 95 - 110 mmol/L Final     CO2   Date Value Ref Range Status   07/04/2019 23 23 - 29 mmol/L Final     BUN, Bld   Date Value Ref Range Status   07/04/2019 14 6 - 20 mg/dL Final     Creatinine   Date Value Ref Range Status   07/04/2019 0.9 0.5 - 1.4 mg/dL Final     Calcium   Date Value Ref Range Status   07/04/2019 9.2 8.7 - 10.5 mg/dL Final     Anion Gap   Date Value Ref Range Status   07/04/2019 11 8 - 16 mmol/L Final     eGFR if    Date Value Ref Range Status   07/04/2019 >60 >60 mL/min/1.73 m^2 Final     eGFR if non    Date Value Ref Range Status   07/04/2019 >60 >60 mL/min/1.73 m^2 Final     Comment:     Calculation used to obtain the estimated glomerular filtration  rate (eGFR) is the CKD-EPI equation.        CrCl cannot be calculated (Patient's most recent lab result is older than the maximum 7 days allowed.).    Assessment:     1. Abnormal stress test    2. YURI (obstructive sleep apnea)    3. Other chest pain    4. Abnormal echocardiogram    5. Family history of premature CAD      Has reproduction of exertional angina with premature family h/o cad and coronary calcification on ct scan. Will continue asa and he will not able tolerate  b blockers or nitrates due top low blood pressure.i thuink he deserves coronary  angiography.  Plan:   lhc /ptca via left radial approach.  I have explained the risks, benefits , and alternatives of the procedure in detail.the patient voices understanding and all questions have been answered.the patient agrees to proceed as planned.

## 2019-09-20 NOTE — H&P (VIEW-ONLY)
Subjective:   Patient ID:  Stephon Lynne is a 48 y.o. male who presents for follow up of Follow-up and Dizziness (with exertion)      HPI  A 47 yo male with recurrent episodes exertional shortness of breath and chest pain exertional in nature for which w/u was negative however he had an ett that was positive by reproduction chest pain that resolved 3 minutes into recovery. He ahs no st depression but that reproduced the symptoms he is complaining of,No results found for this visit on 19. Pain. He has minimal coronary calcification by ct scan.  Past Medical History:   Diagnosis Date    Abnormal echocardiogram 2019    Acromegaly     Family history of premature CAD 2019    Hypogonadism in male     Near syncope 2019    Other chest pain 2019    Sleep apnea        Past Surgical History:   Procedure Laterality Date    EYE SURGERY      HERNIA REPAIR      hydrocele         Social History     Tobacco Use    Smoking status: Former Smoker     Packs/day: 1.50     Years: 12.00     Pack years: 18.00     Types: Cigarettes     Start date: 1990     Last attempt to quit: 2002     Years since quittin.7    Smokeless tobacco: Never Used   Substance Use Topics    Alcohol use: Not Currently     Alcohol/week: 0.0 oz    Drug use: No       Family History   Problem Relation Age of Onset    Aortic aneurysm Mother     Hyperlipidemia Father     Pulmonary embolism Sister     Heart attack Brother     Aortic aneurysm Sister        Current Outpatient Medications   Medication Sig    albuterol sulfate (PROAIR RESPICLICK) 90 mcg/actuation AePB Inhale 180 mcg into the lungs every 4 (four) hours. Rescue    cyanocobalamin, vitamin B-12, (VITAMIN B-12) 1,000 mcg TbSR Take 1 tablet by mouth once daily.    ondansetron (ZOFRAN-ODT) 4 MG TbDL Take 1 tablet (4 mg total) by mouth every 6 (six) hours as needed.    DON'S WORT ORAL Take 1 tablet by mouth once daily.      No current  facility-administered medications for this visit.      Current Outpatient Medications on File Prior to Visit   Medication Sig    albuterol sulfate (PROAIR RESPICLICK) 90 mcg/actuation AePB Inhale 180 mcg into the lungs every 4 (four) hours. Rescue    cyanocobalamin, vitamin B-12, (VITAMIN B-12) 1,000 mcg TbSR Take 1 tablet by mouth once daily.    ondansetron (ZOFRAN-ODT) 4 MG TbDL Take 1 tablet (4 mg total) by mouth every 6 (six) hours as needed.    DON'S WORT ORAL Take 1 tablet by mouth once daily.      No current facility-administered medications on file prior to visit.      Review of patient's allergies indicates:  No Known Allergies  ROS  Constitution: Positive for malaise/fatigue.   Eyes: Negative for blurred vision.   Cardiovascular: Positive for chest pain and dyspnea on exertion. Negative for claudication, cyanosis, irregular heartbeat, leg swelling, near-syncope, orthopnea, palpitations and paroxysmal nocturnal dyspnea.   Respiratory: Positive for shortness of breath, sleep disturbances due to breathing and snoring. Negative for cough and hemoptysis.    Hematologic/Lymphatic: Negative for bleeding problem. Does not bruise/bleed easily.   Skin: Negative for dry skin and itching.   Musculoskeletal: Negative for falls, muscle weakness and myalgias.   Gastrointestinal: Positive for nausea. Negative for abdominal pain, diarrhea, heartburn, hematemesis, hematochezia and melena.   Genitourinary: Negative for flank pain and hematuria.   Neurological: Positive for weakness. Negative for dizziness, focal weakness, headaches, light-headedness, numbness, paresthesias and seizures.   Psychiatric/Behavioral: Negative for altered mental status and memory loss. The patient is not nervous/anxious.    Allergic/Immunologic: Negative for hives.   Objective:   Physical Exam  Vitals:    09/20/19 1545 09/20/19 1547   BP: 112/68 100/70   BP Location: Right arm Left arm   Patient Position: Sitting    Pulse: 76    Weight:  "105.4 kg (232 lb 5.8 oz)    Height: 6' 3" (1.905 m)    Constitutional: He is oriented to person, place, and time. He appears well-developed and well-nourished. No distress.   HENT:   Head: Normocephalic and atraumatic.   Eyes: Pupils are equal, round, and reactive to light. EOM are normal. Right eye exhibits no discharge. Left eye exhibits no discharge.   Neck: Neck supple. No JVD present. No thyromegaly present.   Cardiovascular: Normal rate, regular rhythm, normal heart sounds and intact distal pulses. Exam reveals no gallop and no friction rub.   No murmur heard.  Pulmonary/Chest: Effort normal and breath sounds normal. No respiratory distress. He has no wheezes. He has no rales. He exhibits no tenderness.   Pectus excavatum noted.   Abdominal: Soft. Bowel sounds are normal. He exhibits no distension. There is no tenderness.   Musculoskeletal: Normal range of motion. He exhibits no edema.   Neurological: He is alert and oriented to person, place, and time. No cranial nerve deficit.   Skin: Skin is warm and dry. No rash noted. He is not diaphoretic. No erythema.   Psychiatric: He has a normal mood and affect. His behavior is normal.   Nursing note and vitals reviewed.  Lab Results   Component Value Date    CHOL 139 05/09/2017    CHOL 126 09/01/2015     Lab Results   Component Value Date    HDL 43 05/09/2017    HDL 51 09/01/2015     Lab Results   Component Value Date    LDLCALC 80.2 05/09/2017    LDLCALC 61.0 (L) 09/01/2015     Lab Results   Component Value Date    TRIG 79 05/09/2017    TRIG 70 09/01/2015     Lab Results   Component Value Date    CHOLHDL 30.9 05/09/2017    CHOLHDL 40.5 09/01/2015       Chemistry        Component Value Date/Time     07/04/2019 1126    K 4.2 07/04/2019 1126     07/04/2019 1126    CO2 23 07/04/2019 1126    BUN 14 07/04/2019 1126    CREATININE 0.9 07/04/2019 1126    GLU 91 07/04/2019 1126        Component Value Date/Time    CALCIUM 9.2 07/04/2019 1126    ALKPHOS 60 " 07/04/2019 1126    AST 15 07/04/2019 1126    ALT 16 07/04/2019 1126    BILITOT 2.7 (H) 07/04/2019 1126    ESTGFRAFRICA >60 07/04/2019 1126    EGFRNONAA >60 07/04/2019 1126          Lab Results   Component Value Date    TSH 1.376 08/07/2019     No results found for: INR, PROTIME  Lab Results   Component Value Date    WBC 7.24 07/04/2019    HGB 13.4 (L) 07/04/2019    HCT 39.8 (L) 07/04/2019    MCV 88 07/04/2019     07/04/2019     BMP  Sodium   Date Value Ref Range Status   07/04/2019 139 136 - 145 mmol/L Final     Potassium   Date Value Ref Range Status   07/04/2019 4.2 3.5 - 5.1 mmol/L Final     Chloride   Date Value Ref Range Status   07/04/2019 105 95 - 110 mmol/L Final     CO2   Date Value Ref Range Status   07/04/2019 23 23 - 29 mmol/L Final     BUN, Bld   Date Value Ref Range Status   07/04/2019 14 6 - 20 mg/dL Final     Creatinine   Date Value Ref Range Status   07/04/2019 0.9 0.5 - 1.4 mg/dL Final     Calcium   Date Value Ref Range Status   07/04/2019 9.2 8.7 - 10.5 mg/dL Final     Anion Gap   Date Value Ref Range Status   07/04/2019 11 8 - 16 mmol/L Final     eGFR if    Date Value Ref Range Status   07/04/2019 >60 >60 mL/min/1.73 m^2 Final     eGFR if non    Date Value Ref Range Status   07/04/2019 >60 >60 mL/min/1.73 m^2 Final     Comment:     Calculation used to obtain the estimated glomerular filtration  rate (eGFR) is the CKD-EPI equation.        CrCl cannot be calculated (Patient's most recent lab result is older than the maximum 7 days allowed.).    Assessment:     1. Abnormal stress test    2. YUIR (obstructive sleep apnea)    3. Other chest pain    4. Abnormal echocardiogram    5. Family history of premature CAD      Has reproduction of exertional angina with premature family h/o cad and coronary calcification on ct scan. Will continue asa and he will not able tolerate  b blockers or nitrates due top low blood pressure.i thuink he deserves coronary  angiography.  Plan:   lhc /ptca via left radial approach.  I have explained the risks, benefits , and alternatives of the procedure in detail.the patient voices understanding and all questions have been answered.the patient agrees to proceed as planned.

## 2019-09-21 LAB
ANION GAP SERPL CALC-SCNC: 9 MMOL/L (ref 8–16)
BASOPHILS # BLD AUTO: 0.01 K/UL (ref 0–0.2)
BASOPHILS NFR BLD: 0.2 % (ref 0–1.9)
BUN SERPL-MCNC: 22 MG/DL (ref 6–20)
CALCIUM SERPL-MCNC: 9.3 MG/DL (ref 8.7–10.5)
CHLORIDE SERPL-SCNC: 105 MMOL/L (ref 95–110)
CO2 SERPL-SCNC: 26 MMOL/L (ref 23–29)
CREAT SERPL-MCNC: 1 MG/DL (ref 0.5–1.4)
DIFFERENTIAL METHOD: ABNORMAL
EOSINOPHIL # BLD AUTO: 0.1 K/UL (ref 0–0.5)
EOSINOPHIL NFR BLD: 1.5 % (ref 0–8)
ERYTHROCYTE [DISTWIDTH] IN BLOOD BY AUTOMATED COUNT: 11.9 % (ref 11.5–14.5)
EST. GFR  (AFRICAN AMERICAN): >60 ML/MIN/1.73 M^2
EST. GFR  (NON AFRICAN AMERICAN): >60 ML/MIN/1.73 M^2
GLUCOSE SERPL-MCNC: 87 MG/DL (ref 70–110)
HCT VFR BLD AUTO: 39.1 % (ref 40–54)
HGB BLD-MCNC: 12.9 G/DL (ref 14–18)
IMM GRANULOCYTES # BLD AUTO: 0.01 K/UL (ref 0–0.04)
IMM GRANULOCYTES NFR BLD AUTO: 0.2 % (ref 0–0.5)
LYMPHOCYTES # BLD AUTO: 2.2 K/UL (ref 1–4.8)
LYMPHOCYTES NFR BLD: 37.4 % (ref 18–48)
MCH RBC QN AUTO: 30.5 PG (ref 27–31)
MCHC RBC AUTO-ENTMCNC: 33 G/DL (ref 32–36)
MCV RBC AUTO: 92 FL (ref 82–98)
MONOCYTES # BLD AUTO: 0.6 K/UL (ref 0.3–1)
MONOCYTES NFR BLD: 10.6 % (ref 4–15)
NEUTROPHILS # BLD AUTO: 3 K/UL (ref 1.8–7.7)
NEUTROPHILS NFR BLD: 50.1 % (ref 38–73)
NRBC BLD-RTO: 1 /100 WBC
PLATELET # BLD AUTO: 191 K/UL (ref 150–350)
PMV BLD AUTO: 10.5 FL (ref 9.2–12.9)
POTASSIUM SERPL-SCNC: 3.8 MMOL/L (ref 3.5–5.1)
RBC # BLD AUTO: 4.23 M/UL (ref 4.6–6.2)
SODIUM SERPL-SCNC: 140 MMOL/L (ref 136–145)
WBC # BLD AUTO: 5.97 K/UL (ref 3.9–12.7)

## 2019-09-21 NOTE — PROGRESS NOTES
PHYSICIAN INTERPRETATION AND COMMENTS: Findings are consistent with moderate, non-positional obstructive  sleep apnea (YURI). Overall AHI was 18.0 events per hour. Minimum oxygen saturation during study was 81.5%. Loud snoring  recorded. Treatment is indicated. Intervention with CPAP. Auto PAP 5-20 cm water pressure with suitable mask. Close  follow-up to ensure resolution of symptoms.  CLINICAL HISTORY: 48 year old male presented with: Snoring, daytime sleepiness. Previous evaluation for obstructive  sleep apnea. 18.5 inch neck, BMI of 27, an [place] sleepiness score of 4, history of a previous diagnosis of YURI and  symptoms of nocturnal snoring, waking up choking and witnessed apneas. Based on the clinical history, the patient has a high  pre-test probability of having severe YURI.  SLEEP STUDY FINDINGS: Patient underwent a one night Home Sleep Test and by behavioral criteria, slept for  approximately 5.2 hours, with a sleep latency of 5 minutes and a sleep efficiency of 90.4%. Moderate sleep disordered  breathing (AHI=18) is noted based on a 4% hypopnea desaturation criteria. The patient slept supine 48.5% of the night based  on valid recording time of 5.17 hours. When considering more subtle measures of sleep disordered breathing, the overall  respiratory disturbance index is also moderate (RDI=27) based on a 1% hypopnea desaturation criteria with confirmation by  surrogate arousal indicators. The apneas/hypopneas are accompanied by minimal oxygen desaturation (percent time below 90%  SpO2: 0.7%, Min SpO2: 81.5%). The average desaturation across all sleep disordered breathing events is 3.9%. Snoring occurs  for 26.1% (30 dB) of the study, 5.0% is very loud. The mean pulse rate is 62 BPM, with frequent pulse rate variability (52  events with >= 6 BPM increase/decrease per hour).  TREATMENT CONSIDERATIONS: Consider nasal continuous positive airway pressure (CPAP/AutoPAP) as the initial  treatment choice based on the  AHI severity and co-morbidities. A mandibular advancement splint (MAS) or referral to an  ENT surgeon for modification to the airway should be considered to reduce the potential contribution of YURI on existing  diseases if the patient prefers an alternative therapy or the CPAP trial is unsuccessful.  DISEASE MANAGEMENT CONSIDERATIONS: None.

## 2019-09-21 NOTE — PROCEDURES
Home Sleep Studies  Date/Time: 9/20/2019 7:48 PM  Performed by: Samir Potter MD  Authorized by: Modesto Lipscomb MD       PHYSICIAN INTERPRETATION AND COMMENTS: Findings are consistent with moderate, non-positional obstructive  sleep apnea (YURI). Overall AHI was 18.0 events per hour. Minimum oxygen saturation during study was 81.5%. Loud snoring  recorded. Treatment is indicated. Intervention with CPAP. Auto PAP 5-20 cm water pressure with suitable mask. Close  follow-up to ensure resolution of symptoms.  CLINICAL HISTORY: 48 year old male presented with: Snoring, daytime sleepiness. Previous evaluation for obstructive  sleep apnea. 18.5 inch neck, BMI of 27, an [place] sleepiness score of 4, history of a previous diagnosis of YURI and  symptoms of nocturnal snoring, waking up choking and witnessed apneas. Based on the clinical history, the patient has a high  pre-test probability of having severe YURI.  SLEEP STUDY FINDINGS: Patient underwent a one night Home Sleep Test and by behavioral criteria, slept for  approximately 5.2 hours, with a sleep latency of 5 minutes and a sleep efficiency of 90.4%. Moderate sleep disordered  breathing (AHI=18) is noted based on a 4% hypopnea desaturation criteria. The patient slept supine 48.5% of the night based  on valid recording time of 5.17 hours. When considering more subtle measures of sleep disordered breathing, the overall  respiratory disturbance index is also moderate (RDI=27) based on a 1% hypopnea desaturation criteria with confirmation by  surrogate arousal indicators. The apneas/hypopneas are accompanied by minimal oxygen desaturation (percent time below 90%  SpO2: 0.7%, Min SpO2: 81.5%). The average desaturation across all sleep disordered breathing events is 3.9%. Snoring occurs  for 26.1% (30 dB) of the study, 5.0% is very loud. The mean pulse rate is 62 BPM, with frequent pulse rate variability (52  events with >= 6 BPM increase/decrease per hour).  TREATMENT  CONSIDERATIONS: Consider nasal continuous positive airway pressure (CPAP/AutoPAP) as the initial  treatment choice based on the AHI severity and co-morbidities. A mandibular advancement splint (MAS) or referral to an  ENT surgeon for modification to the airway should be considered to reduce the potential contribution of YURI on existing  diseases if the patient prefers an alternative therapy or the CPAP trial is unsuccessful.  DISEASE MANAGEMENT CONSIDERATIONS: None.

## 2019-09-26 ENCOUNTER — HOSPITAL ENCOUNTER (OUTPATIENT)
Facility: HOSPITAL | Age: 49
Discharge: HOME | End: 2019-09-26
Attending: INTERNAL MEDICINE | Admitting: INTERNAL MEDICINE
Payer: COMMERCIAL

## 2019-09-26 VITALS
BODY MASS INDEX: 28.85 KG/M2 | HEART RATE: 60 BPM | SYSTOLIC BLOOD PRESSURE: 113 MMHG | WEIGHT: 232 LBS | TEMPERATURE: 98 F | OXYGEN SATURATION: 100 % | DIASTOLIC BLOOD PRESSURE: 72 MMHG | HEIGHT: 75 IN | RESPIRATION RATE: 15 BRPM

## 2019-09-26 DIAGNOSIS — Z82.49 FAMILY HISTORY OF ISCHEMIC HEART DISEASE AND OTHER DISEASES OF THE CIRCULATORY SYSTEM: ICD-10-CM

## 2019-09-26 DIAGNOSIS — R94.39 ABNORMAL STRESS TEST: ICD-10-CM

## 2019-09-26 PROCEDURE — 99152 MOD SED SAME PHYS/QHP 5/>YRS: CPT | Mod: ,,, | Performed by: INTERNAL MEDICINE

## 2019-09-26 PROCEDURE — 63600175 PHARM REV CODE 636 W HCPCS

## 2019-09-26 PROCEDURE — 25000003 PHARM REV CODE 250

## 2019-09-26 PROCEDURE — 99152 MOD SED SAME PHYS/QHP 5/>YRS: CPT

## 2019-09-26 PROCEDURE — 93458 CATH LAB PROCEDURE: ICD-10-PCS | Mod: 26,,, | Performed by: INTERNAL MEDICINE

## 2019-09-26 PROCEDURE — 99152 CATH LAB PROCEDURE: ICD-10-PCS | Mod: ,,, | Performed by: INTERNAL MEDICINE

## 2019-09-26 PROCEDURE — 93458 L HRT ARTERY/VENTRICLE ANGIO: CPT | Mod: 26,,, | Performed by: INTERNAL MEDICINE

## 2019-09-26 PROCEDURE — 93458 L HRT ARTERY/VENTRICLE ANGIO: CPT

## 2019-09-26 RX ORDER — NAPROXEN SODIUM 220 MG/1
81 TABLET, FILM COATED ORAL ONCE
Status: COMPLETED | OUTPATIENT
Start: 2019-09-26 | End: 2019-09-26

## 2019-09-26 RX ORDER — DIPHENHYDRAMINE HCL 50 MG
50 CAPSULE ORAL ONCE
Status: COMPLETED | OUTPATIENT
Start: 2019-09-26 | End: 2019-09-26

## 2019-09-26 RX ORDER — DIAZEPAM 5 MG/1
5 TABLET ORAL
Status: DISCONTINUED | OUTPATIENT
Start: 2019-09-26 | End: 2019-09-26 | Stop reason: HOSPADM

## 2019-09-26 RX ORDER — SODIUM CHLORIDE 9 MG/ML
INJECTION, SOLUTION INTRAVENOUS CONTINUOUS
Status: DISCONTINUED | OUTPATIENT
Start: 2019-09-26 | End: 2019-09-26 | Stop reason: HOSPADM

## 2019-09-26 RX ADMIN — SODIUM CHLORIDE: 9 INJECTION, SOLUTION INTRAVENOUS at 11:09

## 2019-09-26 RX ADMIN — NAPROXEN SODIUM 81 MG: 220 TABLET, FILM COATED ORAL at 11:09

## 2019-09-26 RX ADMIN — DIAZEPAM 5 MG: 5 TABLET ORAL at 11:09

## 2019-09-26 RX ADMIN — Medication 50 MG: at 11:09

## 2019-09-26 NOTE — PLAN OF CARE
1600 VSS  DIET TAKEN 100% DISCHARGE INSTRUCTIONS GIVEN TO PT AND WIFE AMB IN UNIT CM AND PIV DC'D REDRESSED 1605 DISCHARGED PER W/C WITH BELONGINGS ACCOMPANIED BY RN AND WIFE

## 2019-09-26 NOTE — BRIEF OP NOTE
Date: 09/26/2019  Surgeon/Physician: Jacky Seaman MD  Assistants: none    Pre Op Diagnosis: abnormal stress test    Post OP Diagnosis: abnormal stress test normal coronaries    Procedure Performed: [facility]    ANESTHESIA:RN IV SEDATION    COMPLICATION: none    Specimen / Tissue Removed: No    Estimated Blood Loss: <50 cc    Prostetics/Devices: None    Findings / Operative Note:   Normal coronaries      PLAN:  Medical therapy   reassure    Discharge Note  Short Stay      SUMMARY     Admit Date: 9/26/2019    Attending Physician: Marilyn Seaman MD     Discharge Physician: Jacky Seaman MD     Discharge Date: 9/26/2019    Final Diagnosis: normal coronaries chest pain abnormal stress test    Outcome of Stay:patient tolerated procedure well no complications has normal coronaries he will follow up as outpatient. Reassured about symptoms non cardiac in nature.he will follow up in clinic he was deemed stable for discharge.    Disposition: Home or Self Care    Patient Instructions:   Current Discharge Medication List      CONTINUE these medications which have NOT CHANGED    Details   albuterol sulfate (PROAIR RESPICLICK) 90 mcg/actuation AePB Inhale 180 mcg into the lungs every 4 (four) hours. Rescue  Qty: 1 each, Refills: 11    Associated Diagnoses: Bronchitis      aspirin (ECOTRIN) 81 MG EC tablet Take 81 mg by mouth once daily.      cyanocobalamin, vitamin B-12, (VITAMIN B-12) 1,000 mcg TbSR Take 1 tablet by mouth once daily.      ondansetron (ZOFRAN-ODT) 4 MG TbDL Take 1 tablet (4 mg total) by mouth every 6 (six) hours as needed.  Qty: 20 tablet, Refills: 0      DON'S WORT ORAL Take 1 tablet by mouth once daily. Advised to hold             Discharge Procedure Orders (must include Diet, Follow-up, Activity)   Discharge Procedure Orders (must include Diet, Follow-up, Activity)   Diet general     Call MD for:  temperature >100.4     Call MD for:  persistent nausea and vomiting     Call MD for:  severe uncontrolled pain      Call MD for:  difficulty breathing, headache or visual disturbances     Call MD for:  redness, tenderness, or signs of infection (pain, swelling, redness, odor or green/yellow discharge around incision site)     Call MD for:  hives     Call MD for:  persistent dizziness or light-headedness     Call MD for:  extreme fatigue     Follow-up Information     Jacky Seaman MD In 2 weeks.    Specialty:  Cardiology  Contact information:  [address]  [place] [address] [phone]

## 2019-09-26 NOTE — INTERVAL H&P NOTE
The patient has been examined and the H&P has been reviewed:    I concur with the findings and no changes have occurred since H&P was written.    Anesthesia/Surgery risks, benefits and alternative options discussed and understood by patient/family.  I have explained the risks, benefits , and alternatives of the procedure in detail.the patient voices understanding and all questions have been answered.the patient agrees to proceed as planned.          Active Hospital Problems    Diagnosis  POA    Abnormal stress test [R94.39]  Yes      Resolved Hospital Problems   No resolved problems to display.

## 2019-10-02 ENCOUNTER — TELEPHONE (OUTPATIENT)
Dept: PULMONOLOGY | Facility: CLINIC | Age: 49
End: 2019-10-02

## 2019-10-02 ENCOUNTER — OFFICE VISIT (OUTPATIENT)
Dept: PULMONOLOGY | Facility: CLINIC | Age: 49
End: 2019-10-02
Payer: COMMERCIAL

## 2019-10-02 VITALS
HEIGHT: 75 IN | RESPIRATION RATE: 18 BRPM | BODY MASS INDEX: 28.68 KG/M2 | SYSTOLIC BLOOD PRESSURE: 132 MMHG | HEART RATE: 74 BPM | DIASTOLIC BLOOD PRESSURE: 82 MMHG | OXYGEN SATURATION: 97 % | WEIGHT: 230.63 LBS

## 2019-10-02 DIAGNOSIS — G47.33 OSA (OBSTRUCTIVE SLEEP APNEA): Primary | ICD-10-CM

## 2019-10-02 PROCEDURE — 99999 PR PBB SHADOW E&M-EST. PATIENT-LVL III: ICD-10-PCS | Mod: PBBFAC,,, | Performed by: INTERNAL MEDICINE

## 2019-10-02 PROCEDURE — 99999 PR PBB SHADOW E&M-EST. PATIENT-LVL III: CPT | Mod: PBBFAC,,, | Performed by: INTERNAL MEDICINE

## 2019-10-02 PROCEDURE — 3008F BODY MASS INDEX DOCD: CPT | Mod: CPTII,S$GLB,, | Performed by: INTERNAL MEDICINE

## 2019-10-02 PROCEDURE — 99214 PR OFFICE/OUTPT VISIT, EST, LEVL IV, 30-39 MIN: ICD-10-PCS | Mod: S$GLB,,, | Performed by: INTERNAL MEDICINE

## 2019-10-02 PROCEDURE — 3008F PR BODY MASS INDEX (BMI) DOCUMENTED: ICD-10-PCS | Mod: CPTII,S$GLB,, | Performed by: INTERNAL MEDICINE

## 2019-10-02 PROCEDURE — 99214 OFFICE O/P EST MOD 30 MIN: CPT | Mod: S$GLB,,, | Performed by: INTERNAL MEDICINE

## 2019-10-02 NOTE — TELEPHONE ENCOUNTER
----- Message from Veronique Mccray sent at 10/2/2019 12:51 PM CDT -----  Contact: Patient   Patient running about 30 minutes late for appointment.Please call back at [phone].      Thanks,  Veronique Mccray

## 2019-10-02 NOTE — PROGRESS NOTES
Subjective:       Patient ID: Stephon Lynne is a 48 y.o. male.    Chief Complaint: He       Sleep Apnea    HPI     Follow up for home PSG      Follow-up for an abnormal chest x-ray with the appearance of apical pleural scarring.  Abnormal Chest X Ray - pulmonary sequestration    Since his last office visit reports that tightness in his chest has improved his fatigue has improved.  He has no complaints of cough or sputum production.  TIghtness I chest and fatigue  Radiation pain in the right shoulder 2-3 times a week for 1 hour or more. About the same to improved      Sleep Apnea  Sleep Apnea  He presents for a sleep evaluation. He complains of snoring, periods of not breathing, decreased memory, decreased concentration, excessive daytime sleepiness, falling asleep while reading, watching television, awakening in the middle of the night because of jerking, feels sleepy during the day, take naps during the day.  Symptoms began 10 years ago, gradually worsening since that time.  He goes to sleep at 8-9 weekdays and 10-10:30 weekends. He awakens 5:30 weekdays and 7 weekends. He falls asleep in 30 minutes.  Collar size na. He denies knees buckling with laughing, completely or partially paralyzed while falling asleep or waking up. Previous evaluation and treatment has included PSG and PSG with C PAP titration.  EPWORTH 12        Past Medical History:   Diagnosis Date    Abnormal echocardiogram 8/7/2019    Acromegaly     Family history of premature CAD 8/7/2019    Hypogonadism in male     Near syncope 8/7/2019    Other chest pain 8/7/2019    Sleep apnea      Past Surgical History:   Procedure Laterality Date    EYE SURGERY      HERNIA REPAIR      hydrocele      LEFT HEART CATHETERIZATION Left 9/26/2019    Procedure: CATHETERIZATION, HEART, LEFT/ptca;  Surgeon: Marilyn Seaman MD;  Location: [facility] CATH LAB;  Service: Cardiology;  Laterality: Left;  left radial approach     Social History     Socioeconomic  History    Marital status:      Spouse name: Not on file    Number of children: Not on file    Years of education: Not on file    Highest education level: Not on file   Occupational History    Not on file   Social Needs    Financial resource strain: Not on file    Food insecurity:     Worry: Not on file     Inability: Not on file    Transportation needs:     Medical: Not on file     Non-medical: Not on file   Tobacco Use    Smoking status: Former Smoker     Packs/day: 1.50     Years: 12.00     Pack years: 18.00     Types: Cigarettes     Start date: 1990     Last attempt to quit: 2002     Years since quittin.7    Smokeless tobacco: Never Used   Substance and Sexual Activity    Alcohol use: Not Currently     Alcohol/week: 0.0 standard drinks    Drug use: No    Sexual activity: Not on file   Lifestyle    Physical activity:     Days per week: Not on file     Minutes per session: Not on file    Stress: Not on file   Relationships    Social connections:     Talks on phone: Not on file     Gets together: Not on file     Attends [employer] service: Not on file     Active member of club or organization: Not on file     Attends meetings of clubs or organizations: Not on file     Relationship status: Not on file   Other Topics Concern    Not on file   Social History Narrative    Not on file     Review of Systems   Constitutional: Positive for fatigue. Negative for fever.   HENT: Positive for postnasal drip, rhinorrhea and congestion.    Eyes: Negative for redness and itching.   Respiratory: Positive for cough, sputum production, shortness of breath, dyspnea on extertion, use of rescue inhaler and Paroxysmal Nocturnal Dyspnea.    Cardiovascular: Positive for chest pain. Negative for palpitations and leg swelling.   Genitourinary: Negative for difficulty urinating and hematuria.   Endocrine: Negative for cold intolerance and heat intolerance.    Skin: Negative for rash.   Gastrointestinal:  "Negative for nausea and abdominal pain.   Neurological: Negative for dizziness, syncope, weakness and light-headedness.   Hematological: Negative for adenopathy. Does not bruise/bleed easily.   Psychiatric/Behavioral: Negative for sleep disturbance. The patient is not nervous/anxious.        Objective:      /82   Pulse 74   Resp 18   Ht 6' 3" (1.905 m)   Wt 104.6 kg (230 lb 9.6 oz)   SpO2 97%   BMI 28.82 kg/m²   Physical Exam   Constitutional: He is oriented to person, place, and time. He appears well-developed and well-nourished.   HENT:   Head: Normocephalic and atraumatic.   Eyes: Pupils are equal, round, and reactive to light. Conjunctivae are normal.   Neck: Neck supple. No JVD present. No tracheal deviation present. No thyromegaly present.   Cardiovascular: Normal rate, regular rhythm and normal heart sounds.   Pulmonary/Chest: Effort normal and breath sounds normal.   Abdominal: Soft.   Musculoskeletal: Normal range of motion.   Lymphadenopathy:     He has no cervical adenopathy.   Neurological: He is alert and oriented to person, place, and time.   Skin: Skin is warm and dry.   Nursing note and vitals reviewed.    Personal Diagnostic Review  CT of chest performed on 9/11/2019 without contrast revealed clearing of pulmonary infiltrates.  Cath lab procedure     Date of Procedure:  09/26/2019    A. Indication/Pre-Operative Diagnosis     The patient is a 48 year old male that was referred for catheterization for chest discomfort, dyspnea on exertion and abnormal ett.. The KARAN risk score is 1.     B. Summary/Post-Operative Diagnosis      1.   Normal coronary arteries.   2.   low normal ef.    C. HPI     I have reviewed the history and physical completed on 09/20/2019. The patient has been examined and I concur with the findings from 09/20/2019.     Patient history was obtained from the patient.     Counseling: The patient was counseled regarding the potential risks and benefits of this procedure, as " well as possible alternative approaches to the problem and gave informed consent.    The ASA Score was Class II.     [facility] Risk Score for MACE is 1.40%. [facility] Risk Score for Death is 0.10%.     Height: 75 in.      Weight: 232 lbs.      BMI: 29 kg/m2    OUTPATIENT MEDICATIONS: Medications were reviewed.  ALLERGIES: Allergies were reviewed.    Laboratory data revealed:     09/20/2019 BUN  22, CREAT  1.0, GLU  87, HCT  39.1, HGB  12.9, INR  1.0, K  3.8, NA  140, PLT  191, PTI  10.9, WBCIR  5.97, APTT  31.0              D. Hemodynamic Results     LVEDP (Pre): 14 mmHg  LVEDP (Post): 16 mmHg  Ejection Fraction: 50%  Global LV Function: normal  Wall Motion: mildly hypokinetic in the inferoapical wall, mildly hypokinetic in the apical wall and mildly hypokinetic in the anteroapical wall    AIR REST:  LV: 94  LVEDP: 14     LV: 95  LVEDP: 16     AOP: 96/65    E. Angiographic Results     Diagnostic:          Ramus artery was present.        - Left Main Coronary Artery:             The LM is normal. There is KARAN 3 flow.     - Left Anterior Descending Artery:             The LAD is normal. There is KARAN 3 flow.     - Left Circumflex Artery:             The LCX is normal. There is KARAN 3 flow.     - Ramus:             The ramus is normal. There is KARAN 3 flow.     - Right Coronary Artery:             The RCA is normal. There is KARAN 3 flow.    F. Details of Procedure     PROCEDURES PERFORMED: LHC, Left Ventriculogram and Coronary Angio    ANESTHESIA: Conscious sedation was achieved with 100 mcg of Fentanyl and 2 mg of Versed. Local anesthesia was achieved with Lidocaine 2%. Moderate conscious sedation was performed and cardiorespiratory functions were monitored the entire procedure by   Kim Barr RN. Sedation began at 01:00 PM and concluded at 01:28 PM, totalling 28.1 minutes.     PRIMARY SURGEON: Marilyn Seaman MD    COMPLICATIONS: There were no complications.    Medications given on sterile field: Lidocaine  2%, Nitroglycerine 200mcg/ml and Verapamil (calan) 5mg/2ml (5 ).    Medications given during procedure: Heparin Bag 1000 Units/500ml, Versed (2 mg), Fentanyl (100 mcg), Phenergan (12.5 mg) and Heparin 1000 Units/ml (5000 units).    The patient was brought to the catheterization laboratory. Bilateral groin prepped and draped. Left radial prepped and draped. The Contrast Chris With Applegate was flushed and connected to contrast. After local anesthesia, a Sheath Terumo 6fr   Glidesheath was inserted into the left Radial artery.     AORTIC ROOT    A Wire Wholey 035 X 175 was inserted into the Aortic Root.     LM    A Cath Infiniti 5fr Jl4 was inserted into the ostial LM. The Wire Wholey 035 X 175 was removed. Angiography performed in multiple views in the left coronary arteries. The Cath Infiniti 5fr Jl4 was removed. All catheter exchanges performed over a wire.     RCA    A Cath Impulse 5fr Fr4 was inserted into the ostial RCA. Angiography performed in multiple views in the right coronary arteries. The Cath Impulse 5fr Fr4 was removed.     Left  VENTRICLE    A Cath Cmh450 Infiniti [id number] was inserted into the left ventricle. Hemodynamics recorded in the left ventricle. Angiography performed in the left ventricle. Hemodynamics recorded in the left ventricle.     The Cath Tpn433 Infiniti [id number] was removed. A Tr Band was applied to the left Radial artery. The Sheath Terumo 6fr Glidesheath was removed. A Tr Band was deployed into the left Radial artery. TR Band to left radial site with 11 mLs of air. Total   Omnipaque injected was 55.0 ml. Total Omnipaque used was 150.0 ml.     Fluoroscopy Time 2.3 minutes   Radiation Dose 183 mGy   Contrast Injected 55 ml Omnipaque   Contrast Used  150 ml Omnipaque            Procedure log documented by Afsaneh King RN and verified by Marilyn Seaman MD    ESTIMATED BLOOD LOSS is < 50 cc.    SPECIMEN: No specimen.     G. Recommendations     1. Routine post-cath care.  2. Continue  medical management.  3. Reassurance.  4. Risk factor reductions.    I certify that I was present for catheter insertion, catheter manipulation, angiography, and angiographic interpretation of this patient.     This document has been electronically    SIGNED BY: Marilyn Seaman MD On: 2019 13:36  IR Heart Cath Images  See OP Notes for results.     IMPRESSION: See OP Notes for results.     This procedure was auto-finalized by: Virtual Radiologist      Office Spirometry Results:     No flowsheet data found.  Pulmonary Studies Review 10/2/2019   SpO2 97   Height 75.000   Weight 3689.62   BMI (Calculated) 28.9   Predicted Distance 505.63   Predicted Distance Meters (Calculated) 718.07         Assessment:       YURI (obstructive sleep apnea)  -     CPAP FOR HOME USE  -     CPAP/BIPAP SUPPLIES          Outpatient Encounter Medications as of 10/2/2019   Medication Sig Dispense Refill    albuterol sulfate (PROAIR RESPICLICK) 90 mcg/actuation AePB Inhale 180 mcg into the lungs every 4 (four) hours. Rescue 1 each 11    aspirin (ECOTRIN) 81 MG EC tablet Take 81 mg by mouth once daily.      cyanocobalamin, vitamin B-12, (VITAMIN B-12) 1,000 mcg TbSR Take 1 tablet by mouth once daily.      ondansetron (ZOFRAN-ODT) 4 MG TbDL Take 1 tablet (4 mg total) by mouth every 6 (six) hours as needed. 20 tablet 0    DON'S WORT ORAL Take 1 tablet by mouth once daily. Advised to hold      aspirin chewable tablet 81 mg       [] diphenhydrAMINE capsule 50 mg       [DISCONTINUED] 0.9%  NaCl infusion       [DISCONTINUED] diazePAM tablet 5 mg        No facility-administered encounter medications on file as of 10/2/2019.      Plan:       Requested Prescriptions      No prescriptions requested or ordered in this encounter     Problem List Items Addressed This Visit     YURI (obstructive sleep apnea) - Primary    Relevant Orders    CPAP FOR HOME USE    CPAP/BIPAP SUPPLIES             Follow up in about 6 weeks (around 2019)  for CPAP initial download - bring CPAP machine.    MEDICAL DECISION MAKING: Moderate to high complexity.  Overall, the multiple problems listed are of moderate to high severity that may impact quality of life and activities of daily living. Side effects of medications, treatment plan as well as options and alternatives reviewed and discussed with patient. There was counseling of patient concerning these issues.    Total time spent in face to face counseling and coordination of care - 45  minutes over 50% of time was used in discussion of prognosis, risks, benefits of treatment, instructions and compliance with regimen . Discussion with other physicians or health care providers (DME, NP, pharmacy, respiratory therapy) occurred.

## 2019-10-16 ENCOUNTER — OFFICE VISIT (OUTPATIENT)
Dept: CARDIOLOGY | Facility: CLINIC | Age: 49
End: 2019-10-16
Payer: COMMERCIAL

## 2019-10-16 VITALS
WEIGHT: 230.38 LBS | DIASTOLIC BLOOD PRESSURE: 74 MMHG | SYSTOLIC BLOOD PRESSURE: 126 MMHG | HEIGHT: 75 IN | HEART RATE: 78 BPM | BODY MASS INDEX: 28.65 KG/M2 | OXYGEN SATURATION: 98 %

## 2019-10-16 DIAGNOSIS — R93.1 ABNORMAL ECHOCARDIOGRAM: ICD-10-CM

## 2019-10-16 DIAGNOSIS — G47.33 OSA (OBSTRUCTIVE SLEEP APNEA): ICD-10-CM

## 2019-10-16 DIAGNOSIS — E22.0 ACROMEGALY: ICD-10-CM

## 2019-10-16 DIAGNOSIS — R37 SEXUAL DYSFUNCTION: Primary | ICD-10-CM

## 2019-10-16 DIAGNOSIS — R07.89 OTHER CHEST PAIN: ICD-10-CM

## 2019-10-16 DIAGNOSIS — R94.39 ABNORMAL STRESS TEST: ICD-10-CM

## 2019-10-16 DIAGNOSIS — Z82.49 FAMILY HISTORY OF PREMATURE CAD: ICD-10-CM

## 2019-10-16 PROCEDURE — 3008F BODY MASS INDEX DOCD: CPT | Mod: CPTII,S$GLB,, | Performed by: INTERNAL MEDICINE

## 2019-10-16 PROCEDURE — 99999 PR PBB SHADOW E&M-EST. PATIENT-LVL III: ICD-10-PCS | Mod: PBBFAC,,, | Performed by: INTERNAL MEDICINE

## 2019-10-16 PROCEDURE — 99213 OFFICE O/P EST LOW 20 MIN: CPT | Mod: S$GLB,,, | Performed by: INTERNAL MEDICINE

## 2019-10-16 PROCEDURE — 3008F PR BODY MASS INDEX (BMI) DOCUMENTED: ICD-10-PCS | Mod: CPTII,S$GLB,, | Performed by: INTERNAL MEDICINE

## 2019-10-16 PROCEDURE — 99213 PR OFFICE/OUTPT VISIT, EST, LEVL III, 20-29 MIN: ICD-10-PCS | Mod: S$GLB,,, | Performed by: INTERNAL MEDICINE

## 2019-10-16 PROCEDURE — 99999 PR PBB SHADOW E&M-EST. PATIENT-LVL III: CPT | Mod: PBBFAC,,, | Performed by: INTERNAL MEDICINE

## 2019-10-16 NOTE — PROGRESS NOTES
Subjective:   Patient ID:  Stephon Lynne is a 48 y.o. male who presents for follow up of Abnormal Stress Test (2 week follow-up/cath )      HPI  A 49 yo male with premature fh cad  Is here for f /u he had heart cath due to continued shortness of breath abnormal ett his coronaries are angiographically normal and some mild ectasia. Has no more chest pain. He is getting cpap eval. Has no anginal equivalent. He is on asa,. Back to normal activity. He has no issue at cath site.  Past Medical History:   Diagnosis Date    Abnormal echocardiogram 2019    Acromegaly     Family history of premature CAD 2019    Hypogonadism in male     Near syncope 2019    Other chest pain 2019    Sleep apnea        Past Surgical History:   Procedure Laterality Date    EYE SURGERY      HERNIA REPAIR      hydrocele      LEFT HEART CATHETERIZATION Left 2019    Procedure: CATHETERIZATION, HEART, LEFT/ptca;  Surgeon: Marilyn Seaman MD;  Location: [facility] CATH LAB;  Service: Cardiology;  Laterality: Left;  left radial approach       Social History     Tobacco Use    Smoking status: Former Smoker     Packs/day: 1.50     Years: 12.00     Pack years: 18.00     Types: Cigarettes     Start date: 1990     Last attempt to quit: 2002     Years since quittin.8    Smokeless tobacco: Never Used   Substance Use Topics    Alcohol use: Not Currently     Alcohol/week: 0.0 standard drinks    Drug use: No       Family History   Problem Relation Age of Onset    Aortic aneurysm Mother     Hyperlipidemia Father     Pulmonary embolism Sister     Heart attack Brother     Aortic aneurysm Sister        Current Outpatient Medications   Medication Sig    albuterol sulfate (PROAIR RESPICLICK) 90 mcg/actuation AePB Inhale 180 mcg into the lungs every 4 (four) hours. Rescue    aspirin (ECOTRIN) 81 MG EC tablet Take 81 mg by mouth once daily.    cyanocobalamin, vitamin B-12, (VITAMIN B-12) 1,000 mcg TbSR Take 1 tablet  by mouth once daily.    ondansetron (ZOFRAN-ODT) 4 MG TbDL Take 1 tablet (4 mg total) by mouth every 6 (six) hours as needed.    DON'S WORT ORAL Take 1 tablet by mouth once daily. Advised to hold     No current facility-administered medications for this visit.      Current Outpatient Medications on File Prior to Visit   Medication Sig    albuterol sulfate (PROAIR RESPICLICK) 90 mcg/actuation AePB Inhale 180 mcg into the lungs every 4 (four) hours. Rescue    aspirin (ECOTRIN) 81 MG EC tablet Take 81 mg by mouth once daily.    cyanocobalamin, vitamin B-12, (VITAMIN B-12) 1,000 mcg TbSR Take 1 tablet by mouth once daily.    ondansetron (ZOFRAN-ODT) 4 MG TbDL Take 1 tablet (4 mg total) by mouth every 6 (six) hours as needed.    DON'S WORT ORAL Take 1 tablet by mouth once daily. Advised to hold     No current facility-administered medications on file prior to visit.      Review of patient's allergies indicates:  No Known Allergies  Review of Systems   Constitution: Negative for malaise/fatigue.   Eyes: Negative for blurred vision.   Cardiovascular: Negative for chest pain, claudication, cyanosis, dyspnea on exertion, irregular heartbeat, leg swelling, near-syncope, orthopnea, palpitations and paroxysmal nocturnal dyspnea.   Respiratory: Negative for cough, hemoptysis and shortness of breath.    Hematologic/Lymphatic: Negative for bleeding problem. Does not bruise/bleed easily.   Skin: Negative for dry skin and itching.   Musculoskeletal: Negative for falls, muscle weakness and myalgias.   Gastrointestinal: Negative for abdominal pain, diarrhea, heartburn, hematemesis, hematochezia and melena.   Genitourinary: Negative for flank pain and hematuria.   Neurological: Negative for dizziness, focal weakness, headaches, light-headedness, numbness, paresthesias, seizures and weakness.   Psychiatric/Behavioral: Negative for altered mental status and memory loss. The patient is not nervous/anxious.   "  Allergic/Immunologic: Negative for hives.       Objective:   Physical Exam   Constitutional: He is oriented to person, place, and time. He appears well-developed and well-nourished.   HENT:   Head: Normocephalic and atraumatic.   Eyes: Pupils are equal, round, and reactive to light. EOM are normal.   Neck: No JVD present. No thyromegaly present.   Cardiovascular: Normal rate, regular rhythm, S1 normal, S2 normal, normal heart sounds and intact distal pulses.   Pulses:       Carotid pulses are 2+ on the right side, and 2+ on the left side.       Radial pulses are 2+ on the right side, and 2+ on the left side.   Pulmonary/Chest: Effort normal and breath sounds normal. No respiratory distress. He has no wheezes. He has no rales. He exhibits no tenderness.   Has pectus excavatum   Musculoskeletal: He exhibits no edema.   Neurological: He is alert and oriented to person, place, and time.   Skin: Skin is warm and dry.   Psychiatric: He has a normal mood and affect. His behavior is normal.   Nursing note and vitals reviewed.    Vitals:    10/16/19 1618 10/16/19 1622   BP: 128/78 126/74   BP Location: Right arm Left arm   Patient Position: Sitting Sitting   BP Method: Large (Manual) Large (Manual)   Pulse: 78    SpO2: 98%    Weight: 104.5 kg (230 lb 6.1 oz)    Height: 6' 3" (1.905 m)      Lab Results   Component Value Date    CHOL 139 05/09/2017    CHOL 126 09/01/2015     Lab Results   Component Value Date    HDL 43 05/09/2017    HDL 51 09/01/2015     Lab Results   Component Value Date    LDLCALC 80.2 05/09/2017    LDLCALC 61.0 (L) 09/01/2015     Lab Results   Component Value Date    TRIG 79 05/09/2017    TRIG 70 09/01/2015     Lab Results   Component Value Date    CHOLHDL 30.9 05/09/2017    CHOLHDL 40.5 09/01/2015       Chemistry        Component Value Date/Time     09/20/2019 1715    K 3.8 09/20/2019 1715     09/20/2019 1715    CO2 26 09/20/2019 1715    BUN 22 (H) 09/20/2019 1715    CREATININE 1.0 09/20/2019 " 1715    GLU 87 09/20/2019 1715        Component Value Date/Time    CALCIUM 9.3 09/20/2019 1715    ALKPHOS 60 07/04/2019 1126    AST 15 07/04/2019 1126    ALT 16 07/04/2019 1126    BILITOT 2.7 (H) 07/04/2019 1126    ESTGFRAFRICA >60.0 09/20/2019 1715    EGFRNONAA >60.0 09/20/2019 1715          Lab Results   Component Value Date    TSH 1.376 08/07/2019     Lab Results   Component Value Date    INR 1.0 09/20/2019     Lab Results   Component Value Date    WBC 5.97 09/20/2019    HGB 12.9 (L) 09/20/2019    HCT 39.1 (L) 09/20/2019    MCV 92 09/20/2019     09/20/2019     BMP  Sodium   Date Value Ref Range Status   09/20/2019 140 136 - 145 mmol/L Final     Potassium   Date Value Ref Range Status   09/20/2019 3.8 3.5 - 5.1 mmol/L Final     Chloride   Date Value Ref Range Status   09/20/2019 105 95 - 110 mmol/L Final     CO2   Date Value Ref Range Status   09/20/2019 26 23 - 29 mmol/L Final     BUN, Bld   Date Value Ref Range Status   09/20/2019 22 (H) 6 - 20 mg/dL Final     Creatinine   Date Value Ref Range Status   09/20/2019 1.0 0.5 - 1.4 mg/dL Final     Calcium   Date Value Ref Range Status   09/20/2019 9.3 8.7 - 10.5 mg/dL Final     Anion Gap   Date Value Ref Range Status   09/20/2019 9 8 - 16 mmol/L Final     eGFR if    Date Value Ref Range Status   09/20/2019 >60.0 >60 mL/min/1.73 m^2 Final     eGFR if non    Date Value Ref Range Status   09/20/2019 >60.0 >60 mL/min/1.73 m^2 Final     Comment:     Calculation used to obtain the estimated glomerular filtration  rate (eGFR) is the CKD-EPI equation.        CrCl cannot be calculated (Patient's most recent lab result is older than the maximum 7 days allowed.).    Assessment:     1. Sexual dysfunction    2. Acromegaly    3. YURI (obstructive sleep apnea)    4. Other chest pain    5. Abnormal echocardiogram    6. Family history of premature CAD    7. Abnormal stress test      Has essentially normal coronaries with mild arteriomegaly in  lad. No stenosis noted low normal ef.asymptomatic  Plan:   Reassure   F/u prn.

## 2020-01-10 ENCOUNTER — TELEPHONE (OUTPATIENT)
Dept: PULMONOLOGY | Facility: CLINIC | Age: 50
End: 2020-01-10

## 2020-01-10 NOTE — TELEPHONE ENCOUNTER
----- Message from Julisa Gurrola sent at 1/8/2020  2:20 PM CST -----  Regarding: Appointment Needed  Jose Heller!    Mr Lynne needs to follow up with Dr Lipscomb or an NP by the end of January to be compliant. Please call patient to schedule a follow up for ASAP.    Thanks,  Julisa Rolon, CRT-SDS

## 2020-01-31 ENCOUNTER — OFFICE VISIT (OUTPATIENT)
Dept: PULMONOLOGY | Facility: CLINIC | Age: 50
End: 2020-01-31
Payer: COMMERCIAL

## 2020-01-31 VITALS
HEIGHT: 75 IN | DIASTOLIC BLOOD PRESSURE: 78 MMHG | RESPIRATION RATE: 18 BRPM | SYSTOLIC BLOOD PRESSURE: 116 MMHG | WEIGHT: 241.38 LBS | OXYGEN SATURATION: 97 % | HEART RATE: 78 BPM | BODY MASS INDEX: 30.01 KG/M2

## 2020-01-31 DIAGNOSIS — R07.89 OTHER CHEST PAIN: ICD-10-CM

## 2020-01-31 DIAGNOSIS — E22.0 ACROMEGALY: ICD-10-CM

## 2020-01-31 DIAGNOSIS — R93.1 ABNORMAL ECHOCARDIOGRAM: ICD-10-CM

## 2020-01-31 DIAGNOSIS — G47.33 OSA ON CPAP: Primary | Chronic | ICD-10-CM

## 2020-01-31 DIAGNOSIS — R35.0 URINARY FREQUENCY: ICD-10-CM

## 2020-01-31 PROCEDURE — 99999 PR PBB SHADOW E&M-EST. PATIENT-LVL III: CPT | Mod: PBBFAC,,, | Performed by: NURSE PRACTITIONER

## 2020-01-31 PROCEDURE — 99214 OFFICE O/P EST MOD 30 MIN: CPT | Mod: S$GLB,,, | Performed by: NURSE PRACTITIONER

## 2020-01-31 PROCEDURE — 99214 PR OFFICE/OUTPT VISIT, EST, LEVL IV, 30-39 MIN: ICD-10-PCS | Mod: S$GLB,,, | Performed by: NURSE PRACTITIONER

## 2020-01-31 PROCEDURE — 3008F BODY MASS INDEX DOCD: CPT | Mod: CPTII,S$GLB,, | Performed by: NURSE PRACTITIONER

## 2020-01-31 PROCEDURE — 99999 PR PBB SHADOW E&M-EST. PATIENT-LVL III: ICD-10-PCS | Mod: PBBFAC,,, | Performed by: NURSE PRACTITIONER

## 2020-01-31 PROCEDURE — 3008F PR BODY MASS INDEX (BMI) DOCUMENTED: ICD-10-PCS | Mod: CPTII,S$GLB,, | Performed by: NURSE PRACTITIONER

## 2020-01-31 NOTE — ASSESSMENT & PLAN NOTE
Notes recorded by Marilyn Seaman MD on 9/18/2019 at 8:47 PM CDT  Has few skipped beats but no significant arrythmias.  No chest pain since resuming CPAP in 10/2019.

## 2020-01-31 NOTE — ASSESSMENT & PLAN NOTE
Benefits and compliant with Auto CPAP 6 -20  Cm  AHI: 5.6   Ambia 2  Nasal pillows mask  HME: [facility]   Changed in clinic auto CPAP 6-12 cm   Patient likes auto CPAP settings and starting pressure of 6 cm.   Yearly supply order

## 2020-01-31 NOTE — PROGRESS NOTES
Subjective:      Patient ID: Stephon Lynne is a 49 y.o. male.    Chief Complaint: Sleep Apnea    HPI: Stephon Lynne is here for follow up for YURI with initial CPAP complaince assessment.   He is on Auto CPAP of 6-20 cmH2O pressure which is optimally controlling sleep apnea with apneic index (AHI) 5.6 events an hour.   He is compliant with CPAP use. Complaince download today reveals 90% of days with greater than 4 hours of device use.   Patient reports benefit from CPAP use and denies snoring or excessive daytime sleepiness.   Awakens refreshed, no longer chest pain. No palpitations. Has more energy. Not taking naps.   Patient reports no complaints. Nasal pillows mask is tolerated, states needs replacement mask since not holding as well over past 2 weeks, orders for supplies.   [place] 2    Other CPAP history  Original yuri diagnosis 2011 with [facility]. Used CPAP 2011 -2016. Stopped using since did not like pressure of 14 cm and failed to follow up to change.  Follow up in 2019 since developed chest pain with palpitations.   9/17/2019 Home Sleep Study moderate, non-positional obstructive sleep apnea (YURI). Overall AHI was 18.0 events per hour.   10/2/2019 orders per Dr. 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     Has albuterol inhaler on hand, since had shortness of breath on exertion, since resuming CPAP not long SOB, has not used albuterol in 3 months.     Previous Report Reviewed: lab reports and office notes     Past Medical History: The following portions of the patient's history were reviewed and updated as appropriate:   He  has a past surgical history that includes Eye surgery; hydrocele; Hernia repair; and Left heart catheterization (Left, 9/26/2019).  His family history includes Aortic aneurysm in his mother and sister; Heart attack in his brother; Hyperlipidemia in his  "father; Pulmonary embolism in his sister.  He  reports that he quit smoking about 18 years ago. His smoking use included cigarettes. He started smoking about 30 years ago. He has a 18.00 pack-year smoking history. He has never used smokeless tobacco. He reports that he drank alcohol. He reports that he does not use drugs.  He has a current medication list which includes the following prescription(s): albuterol sulfate, aspirin, cyanocobalamin (vitamin b-12), ondansetron, and mohit's wort.  He has No Known Allergies..    The following portions of the patient's history were reviewed and updated as appropriate: allergies, current medications, past family history, past medical history, past social history, past surgical history and problem list.    Review of Systems   Constitutional: Negative for fever, chills, weight loss, weight gain, activity change, appetite change, fatigue and night sweats.   HENT: Negative for postnasal drip, rhinorrhea, sinus pressure, voice change and congestion.    Eyes: Negative for redness and itching.   Respiratory: Negative for snoring, cough, sputum production, chest tightness, shortness of breath, wheezing, orthopnea, asthma nighttime symptoms, dyspnea on extertion, use of rescue inhaler and somnolence.    Cardiovascular: Negative.  Negative for chest pain, palpitations and leg swelling.   Genitourinary: Negative for difficulty urinating and hematuria.   Endocrine: Negative for cold intolerance and heat intolerance.    Musculoskeletal: Negative for arthralgias, gait problem, joint swelling and myalgias.   Skin: Negative.    Gastrointestinal: Negative for nausea, vomiting, abdominal pain and acid reflux.   Neurological: Negative for dizziness, weakness, light-headedness and headaches.   Hematological: Negative for adenopathy. No excessive bruising.   All other systems reviewed and are negative.     Objective:   /78   Pulse 78   Resp 18   Ht 6' 3" (1.905 m)   Wt 109.5 kg (241 " lb 6.5 oz)   SpO2 97%   BMI 30.17 kg/m²   Physical Exam   Constitutional: He is oriented to person, place, and time. He appears well-developed and well-nourished. He is active and cooperative.  Non-toxic appearance. He does not appear ill. No distress.   HENT:   Head: Normocephalic and atraumatic.   Right Ear: External ear normal.   Left Ear: External ear normal.   Nose: Nose normal.   Mouth/Throat: Oropharynx is clear and moist. No oropharyngeal exudate.   Eyes: Conjunctivae are normal.   Neck: Normal range of motion. Neck supple.   Cardiovascular: Normal rate, regular rhythm, normal heart sounds and intact distal pulses.   Pulmonary/Chest: Effort normal and breath sounds normal.   Abdominal: Soft.   Musculoskeletal: He exhibits no edema.   Neurological: He is alert and oriented to person, place, and time.   Skin: Skin is warm and dry.   Psychiatric: He has a normal mood and affect. His behavior is normal. Judgment and thought content normal.   Nursing note and vitals reviewed.      Personal Diagnostic Review  CPAP download  APAP 6-20 cm  Compliance Summary  12/31/2019 - 1/29/2020 (30 days)  Days with Device Usage 29 days  Days without Device Usage 1 day  Percent Days with Device Usage 96.7%  Cumulative Usage 6 days 22 hrs. 45 mins. 30 secs.  Maximum Usage (1 Day) 9 hrs. 59 mins. 28 secs.  Average Usage (All Days) 5 hrs. 33 mins. 31 secs.  Average Usage (Days Used) 5 hrs. 45 mins. 1 secs.  Minimum Usage (1 Day) 3 hrs. 11 mins. 34 secs.  Percent of Days with Usage >= 4 Hours 90.0%  Percent of Days with Usage < 4 Hours 10.0%  Date Range  Total Blower Time 6 days 22 hrs. 45 mins. 37 secs.  Average AHI 5.6  Auto-CPAP Summary  Auto-CPAP Mean Pressure 7.8 cmH2O  Auto-CPAP Peak Average Pressure 10.7 cmH2O  Average Device Pressure <= 90% of Time 9.9 cmH2O  Average Time in Large Leak Per Day 14 secs.    Home Sleep Study  9/17/2019  PHYSICIAN INTERPRETATION AND COMMENTS: Findings are consistent with moderate,  "non-positional obstructive  sleep apnea (YURI). Overall AHI was 18.0 events per hour. Minimum oxygen saturation during study was 81.5%. Loud snoring  recorded. Treatment is indicated. Intervention with CPAP. Auto PAP 5-20 cm water pressure with suitable mask. Close  follow-up to ensure resolution of symptoms.    Assessment:     1. YURI on CPAP    2. Acromegaly    3. Urinary frequency    4. Abnormal echocardiogram    5. Other chest pain        Orders Placed This Encounter   Procedures    HME - OTHER     Changed in clinic to auto CPAP 6-12 cm     Order Specific Question:   Type of Equipment:     Answer:   CPAP     Order Specific Question:   Height:     Answer:   6' 3" (1.905 m)     Order Specific Question:   Weight:     Answer:   109.5 kg (241 lb 6.5 oz)     Order Specific Question:   Does patient have medical equipment at home?     Answer:   CPAP    CPAP/BIPAP SUPPLIES     Benefits and compliant   90 day supply. 4 refills.  Updated Yearly supply order   HME: [facility]  Patient needs replacement nasal pillows mask, last obtained 10/2019.     Order Specific Question:   Type of mask:     Answer:   Nasal     Comments:   pillows      Order Specific Question:   Headgear?     Answer:   Yes     Order Specific Question:   Tubing?     Answer:   Yes     Order Specific Question:   Humidifier chamber?     Answer:   Yes     Order Specific Question:   Chin strap?     Answer:   Yes     Order Specific Question:   Filters?     Answer:   Yes     Order Specific Question:   Cushions?     Answer:   Yes     Order Specific Question:   Length of need (1-99 months):     Answer:   99     Plan:     Problem List Items Addressed This Visit     RESOLVED: Urinary frequency     Resolved since resuming CPAP          RESOLVED: Other chest pain     Resolved since resuming CPAP 10/2019         YURI on CPAP - Primary (Chronic)     Benefits and compliant with Auto CPAP 6 -20  Cm  AHI: 5.6   [place] 2  Nasal pillows mask  HME: [facility]   Changed in clinic auto " CPAP 6-12 cm   Patient likes auto CPAP settings and starting pressure of 6 cm.   Yearly supply order              Relevant Orders    HME - OTHER    CPAP/BIPAP SUPPLIES    Acromegaly    Abnormal echocardiogram     Notes recorded by Marilyn Seaman MD on 9/18/2019 at 8:47 PM CDT  Has few skipped beats but no significant arrythmias.  No chest pain since resuming CPAP in 10/2019.            TIME SPENT WITH PATIENT: Time spent:25 minutes in face to face  discussion concerning diagnosis, prognosis, review of lab and test results, benefits of treatment as well as management of disease, counseling of patient and coordination of care between various health  care providers . Greater than half the time spent was used for coordination of care and counseling of patient.      (DME) - [facility]  Reviewed therapeutic goals for positive airway pressure therapy Auto CPAP  Ideal is usage 100% of nights for 6 - 8 hours per night. Minimum usage is 70% of night for at least 4 hours per night used.     Follow up in about 1 year (around 2/2/2021) for CPAP 1 year compliance download.

## 2020-08-24 ENCOUNTER — TELEPHONE (OUTPATIENT)
Dept: PULMONOLOGY | Facility: CLINIC | Age: 50
End: 2020-08-24

## 2021-03-31 ENCOUNTER — IMMUNIZATION (OUTPATIENT)
Dept: INTERNAL MEDICINE | Facility: CLINIC | Age: 51
End: 2021-03-31
Payer: COMMERCIAL

## 2021-03-31 DIAGNOSIS — Z23 NEED FOR VACCINATION: Primary | ICD-10-CM

## 2021-03-31 PROCEDURE — 0001A COVID-19, MRNA, LNP-S, PF, 30 MCG/0.3 ML DOSE VACCINE: ICD-10-PCS | Mod: CV19,S$GLB,, | Performed by: FAMILY MEDICINE

## 2021-03-31 PROCEDURE — 91300 COVID-19, MRNA, LNP-S, PF, 30 MCG/0.3 ML DOSE VACCINE: CPT | Mod: S$GLB,,, | Performed by: FAMILY MEDICINE

## 2021-03-31 PROCEDURE — 91300 COVID-19, MRNA, LNP-S, PF, 30 MCG/0.3 ML DOSE VACCINE: ICD-10-PCS | Mod: S$GLB,,, | Performed by: FAMILY MEDICINE

## 2021-03-31 PROCEDURE — 0001A COVID-19, MRNA, LNP-S, PF, 30 MCG/0.3 ML DOSE VACCINE: CPT | Mod: CV19,S$GLB,, | Performed by: FAMILY MEDICINE

## 2021-04-01 ENCOUNTER — OFFICE VISIT (OUTPATIENT)
Dept: PULMONOLOGY | Facility: CLINIC | Age: 51
End: 2021-04-01
Payer: COMMERCIAL

## 2021-04-01 VITALS
WEIGHT: 240.31 LBS | OXYGEN SATURATION: 97 % | HEART RATE: 75 BPM | BODY MASS INDEX: 29.88 KG/M2 | RESPIRATION RATE: 16 BRPM | SYSTOLIC BLOOD PRESSURE: 122 MMHG | DIASTOLIC BLOOD PRESSURE: 84 MMHG | HEIGHT: 75 IN

## 2021-04-01 DIAGNOSIS — G47.33 OSA ON CPAP: Primary | ICD-10-CM

## 2021-04-01 DIAGNOSIS — E66.9 CLASS 1 OBESITY WITHOUT SERIOUS COMORBIDITY WITH BODY MASS INDEX (BMI) OF 30.0 TO 30.9 IN ADULT, UNSPECIFIED OBESITY TYPE: ICD-10-CM

## 2021-04-01 PROBLEM — E66.811 CLASS 1 OBESITY WITHOUT SERIOUS COMORBIDITY WITH BODY MASS INDEX (BMI) OF 30.0 TO 30.9 IN ADULT: Status: ACTIVE | Noted: 2021-04-01

## 2021-04-01 PROCEDURE — 3008F BODY MASS INDEX DOCD: CPT | Mod: CPTII,S$GLB,, | Performed by: NURSE PRACTITIONER

## 2021-04-01 PROCEDURE — 99999 PR PBB SHADOW E&M-EST. PATIENT-LVL III: ICD-10-PCS | Mod: PBBFAC,,, | Performed by: NURSE PRACTITIONER

## 2021-04-01 PROCEDURE — 99214 PR OFFICE/OUTPT VISIT, EST, LEVL IV, 30-39 MIN: ICD-10-PCS | Mod: S$GLB,,, | Performed by: NURSE PRACTITIONER

## 2021-04-01 PROCEDURE — 99214 OFFICE O/P EST MOD 30 MIN: CPT | Mod: S$GLB,,, | Performed by: NURSE PRACTITIONER

## 2021-04-01 PROCEDURE — 3008F PR BODY MASS INDEX (BMI) DOCUMENTED: ICD-10-PCS | Mod: CPTII,S$GLB,, | Performed by: NURSE PRACTITIONER

## 2021-04-01 PROCEDURE — 99999 PR PBB SHADOW E&M-EST. PATIENT-LVL III: CPT | Mod: PBBFAC,,, | Performed by: NURSE PRACTITIONER

## 2021-04-21 ENCOUNTER — IMMUNIZATION (OUTPATIENT)
Dept: INTERNAL MEDICINE | Facility: CLINIC | Age: 51
End: 2021-04-21
Payer: COMMERCIAL

## 2021-04-21 DIAGNOSIS — Z23 NEED FOR VACCINATION: Primary | ICD-10-CM

## 2021-04-21 PROCEDURE — 91300 COVID-19, MRNA, LNP-S, PF, 30 MCG/0.3 ML DOSE VACCINE: CPT | Mod: PBBFAC | Performed by: FAMILY MEDICINE

## 2021-04-21 PROCEDURE — 0002A COVID-19, MRNA, LNP-S, PF, 30 MCG/0.3 ML DOSE VACCINE: CPT | Mod: PBBFAC | Performed by: FAMILY MEDICINE

## 2021-06-06 ENCOUNTER — PATIENT MESSAGE (OUTPATIENT)
Dept: PULMONOLOGY | Facility: CLINIC | Age: 51
End: 2021-06-06

## 2021-08-16 ENCOUNTER — PATIENT MESSAGE (OUTPATIENT)
Dept: PULMONOLOGY | Facility: CLINIC | Age: 51
End: 2021-08-16

## 2021-08-16 DIAGNOSIS — G47.33 OSA ON CPAP: Primary | ICD-10-CM

## 2021-09-14 ENCOUNTER — PATIENT MESSAGE (OUTPATIENT)
Dept: PULMONOLOGY | Facility: CLINIC | Age: 51
End: 2021-09-14

## 2022-04-04 ENCOUNTER — TELEPHONE (OUTPATIENT)
Dept: PULMONOLOGY | Facility: CLINIC | Age: 52
End: 2022-04-04
Payer: COMMERCIAL

## 2022-04-04 NOTE — TELEPHONE ENCOUNTER
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 Please call to advise at Ph .[phone] (home)

## 2022-04-06 ENCOUNTER — PATIENT MESSAGE (OUTPATIENT)
Dept: PULMONOLOGY | Facility: CLINIC | Age: 52
End: 2022-04-06
Payer: COMMERCIAL

## 2022-04-06 ENCOUNTER — TELEPHONE (OUTPATIENT)
Dept: PULMONOLOGY | Facility: CLINIC | Age: 52
End: 2022-04-06
Payer: COMMERCIAL

## 2022-09-13 ENCOUNTER — OFFICE VISIT (OUTPATIENT)
Dept: DERMATOLOGY | Facility: CLINIC | Age: 52
End: 2022-09-13
Payer: COMMERCIAL

## 2022-09-13 DIAGNOSIS — D48.5 NEOPLASM OF UNCERTAIN BEHAVIOR OF SKIN: Primary | ICD-10-CM

## 2022-09-13 PROCEDURE — 1159F PR MEDICATION LIST DOCUMENTED IN MEDICAL RECORD: ICD-10-PCS | Mod: CPTII,S$GLB,, | Performed by: DERMATOLOGY

## 2022-09-13 PROCEDURE — 88305 TISSUE EXAM BY PATHOLOGIST: CPT | Mod: 26,,, | Performed by: PATHOLOGY

## 2022-09-13 PROCEDURE — 88305 TISSUE EXAM BY PATHOLOGIST: ICD-10-PCS | Mod: 26,,, | Performed by: PATHOLOGY

## 2022-09-13 PROCEDURE — 88305 TISSUE EXAM BY PATHOLOGIST: CPT | Performed by: PATHOLOGY

## 2022-09-13 PROCEDURE — 11102 PR TANGENTIAL BIOPSY, SKIN, SINGLE LESION: ICD-10-PCS | Mod: S$GLB,,, | Performed by: DERMATOLOGY

## 2022-09-13 PROCEDURE — 99999 PR PBB SHADOW E&M-EST. PATIENT-LVL III: ICD-10-PCS | Mod: PBBFAC,,, | Performed by: DERMATOLOGY

## 2022-09-13 PROCEDURE — 99999 PR PBB SHADOW E&M-EST. PATIENT-LVL III: CPT | Mod: PBBFAC,,, | Performed by: DERMATOLOGY

## 2022-09-13 PROCEDURE — 11102 TANGNTL BX SKIN SINGLE LES: CPT | Mod: S$GLB,,, | Performed by: DERMATOLOGY

## 2022-09-13 PROCEDURE — 99499 NO LOS: ICD-10-PCS | Mod: S$GLB,,, | Performed by: DERMATOLOGY

## 2022-09-13 PROCEDURE — 88342 IMHCHEM/IMCYTCHM 1ST ANTB: CPT | Mod: 26,,, | Performed by: PATHOLOGY

## 2022-09-13 PROCEDURE — 11103 PR TANGENTIAL BIOPSY, SKIN, EA ADDTL LESION: ICD-10-PCS | Mod: S$GLB,,, | Performed by: DERMATOLOGY

## 2022-09-13 PROCEDURE — 99499 UNLISTED E&M SERVICE: CPT | Mod: S$GLB,,, | Performed by: DERMATOLOGY

## 2022-09-13 PROCEDURE — 1159F MED LIST DOCD IN RCRD: CPT | Mod: CPTII,S$GLB,, | Performed by: DERMATOLOGY

## 2022-09-13 PROCEDURE — 11103 TANGNTL BX SKIN EA SEP/ADDL: CPT | Mod: S$GLB,,, | Performed by: DERMATOLOGY

## 2022-09-13 PROCEDURE — 88342 CHG IMMUNOCYTOCHEMISTRY: ICD-10-PCS | Mod: 26,,, | Performed by: PATHOLOGY

## 2022-09-13 PROCEDURE — 1160F PR REVIEW ALL MEDS BY PRESCRIBER/CLIN PHARMACIST DOCUMENTED: ICD-10-PCS | Mod: CPTII,S$GLB,, | Performed by: DERMATOLOGY

## 2022-09-13 PROCEDURE — 1160F RVW MEDS BY RX/DR IN RCRD: CPT | Mod: CPTII,S$GLB,, | Performed by: DERMATOLOGY

## 2022-09-13 PROCEDURE — 88342 IMHCHEM/IMCYTCHM 1ST ANTB: CPT | Performed by: PATHOLOGY

## 2022-09-13 NOTE — PATIENT INSTRUCTIONS
Shave Biopsy Wound Care    Your doctor has performed a shave biopsy today.  A band aid and vaseline ointment has been placed over the site.  This should remain in place for 24 hours.  It is recommended that you keep the area dry for the first 24 hours.  After 24 hours, you may remove the band aid and wash the area with warm soap and water and apply Vaseline jelly.  Many patients prefer to use Neosporin or Bacitracin ointment.  This is acceptable; however, know that you can develop an allergy to this medication even if you have used it safely for years.  It is important to keep the area moist.  Letting it dry out and get air slows healing time, and will worsen the scar.  Band aid is optional after first 24 hours.      If you notice increasing redness, tenderness, pain, or yellow drainage at the biopsy site, please notify your doctor.  These are signs of an infection.    If your biopsy site is bleeding, apply firm pressure for 15 minutes straight.  Repeat for another 15 minutes, if it is still bleeding.   If the surgical site continues to bleed, then please contact your doctor.      [facility] [place] [address]/ [phone] FAX/ [email or web address]

## 2022-09-13 NOTE — PROGRESS NOTES
"  Subjective:       Patient ID:  Stephon Lynne is a 51 y.o. male who presents for   Chief Complaint   Patient presents with    Bump      History of Present Illness: The patient presents with chief complaint of bumps.  Location: R forearm  Duration: 4 weeks  Signs/Symptoms: growing, got a spot next to it; crusty; when he shower it would rub away and remove the crust, then have hard white kernals; was painful;  denies picking; + itching    Prior treatments:   Topical steroid - dried it out but then it came right back    Denies personal h/o skin cancer  Dad had skin cancer  Mom had skin "issues" - lior    Reports significant sun exposure    Review of Systems   Skin:  Positive for activity-related sunscreen use. Negative for daily sunscreen use and recent sunburn.      Objective:    Physical Exam   Constitutional: He appears well-developed and well-nourished. No distress.   Neurological: He is alert and oriented to person, place, and time. He is not disoriented.   Psychiatric: He has a normal mood and affect.   Skin:   Areas Examined (abnormalities noted in diagram):   Head / Face Inspection Performed  RUE Inspected  LUE Inspection Performed                 Diagram Legend     Erythematous scaling macule/papule c/w actinic keratosis       Vascular papule c/w angioma      Pigmented verrucoid papule/plaque c/w seborrheic keratosis      Yellow umbilicated papule c/w sebaceous hyperplasia      Irregularly shaped tan macule c/w lentigo     1-2 mm smooth white papules consistent with Milia      Movable subcutaneous cyst with punctum c/w epidermal inclusion cyst      Subcutaneous movable cyst c/w pilar cyst      Firm pink to brown papule c/w dermatofibroma      Pedunculated fleshy papule(s) c/w skin tag(s)      Evenly pigmented macule c/w junctional nevus     Mildly variegated pigmented, slightly irregular-bordered macule c/w mildly atypical nevus      Flesh colored to evenly pigmented papule c/w intradermal nevus       " Pink pearly papule/plaque c/w basal cell carcinoma      Erythematous hyperkeratotic cursted plaque c/w SCC      Surgical scar with no sign of skin cancer recurrence      Open and closed comedones      Inflammatory papules and pustules      Verrucoid papule consistent consistent with wart     Erythematous eczematous patches and plaques     Dystrophic onycholytic nail with subungual debris c/w onychomycosis     Umbilicated papule    Erythematous-base heme-crusted tan verrucoid plaque consistent with inflamed seborrheic keratosis     Erythematous Silvery Scaling Plaque c/w Psoriasis     See annotation                Assessment / Plan:      Pathology Orders:       Normal Orders This Visit    Specimen to Pathology, Dermatology     Comments:    Number of Specimens:->2  ------------------------->-------------------------  Spec 1 Procedure:->Biopsy  Spec 1 Clinical Impression:->prurigo papule r/o BCC  Spec 1 Source:->R cheek  ------------------------->-------------------------  Spec 2 Procedure:->Biopsy  Spec 2 Clinical Impression:->prurigo papule  Spec 2 Source:->R forearm    Questions:    Procedure Type: Dermatology and skin neoplasms    Number of Specimens: 2    ------------------------: -------------------------    Spec 1 Procedure: Biopsy    Spec 1 Clinical Impression: prurigo papule r/o BCC    Spec 1 Source: R cheek    ------------------------: -------------------------    Spec 2 Procedure: Biopsy    Spec 2 Clinical Impression: prurigo papule    Spec 2 Source: R forearm    Release to patient:           Neoplasm of uncertain behavior of skin  -     Specimen to Pathology, Dermatology  - discussed avoiding picking    Shave biopsy procedure note: x 2    Shave biopsy performed after verbal consent including risk of infection, scar, recurrence, need for additional treatment of site. Area prepped with alcohol, anesthetized with approximately 1.0cc of 1% lidocaine with epinephrine. Lesional tissue shaved with razor blade.  Hemostasis achieved with application of aluminum chloride followed by hyfrecation. No complications. Dressing applied. Wound care explained.           F/u prn path

## 2022-09-23 LAB
FINAL PATHOLOGIC DIAGNOSIS: NORMAL
GROSS: NORMAL
Lab: NORMAL
MICROSCOPIC EXAM: NORMAL

## 2022-09-26 RX ORDER — MUPIROCIN 20 MG/G
OINTMENT TOPICAL 2 TIMES DAILY
Qty: 30 G | Refills: 0 | Status: SHIPPED | OUTPATIENT
Start: 2022-09-26

## 2022-09-26 NOTE — PROGRESS NOTES
"Please call and inform patient that biopsies were negative for cancer. They are consistent with spots that are picked at/scratched at.  I sent a topical antibiotic ointment to use on them twice daily for 2 weeks. The most important thing is to avoid picking at/scratching at them and avoid removing any scabs, or they will not heal.      Final Pathologic Diagnosis 1.  Skin, right cheek, shave biopsy:   - LICHEN SIMPLEX CHRONICUS/ PRURIGO NODULARIS, EXCORIATED/ULCERATED AND   IMPETIGINIZED.   This lesion is benign.   2.  Skin, right forearm, shave biopsy:   - LICHEN SIMPLEX CHRONICUS/ PRURIGO NODULARIS.   This lesion is benign.   Comment: Interp By Alex Esparza M.D., Signed on 09/23/2022 at 16:34  Gross Pathology ID/Patient ID:  [id number]   Received in 2 parts:   Part 1:   Pathology ID/Patient ID:[id number]   The specimen is received in formalin labeled "right cheek".  The specimen   consists of a circular shave of yellow skin measuring 0.6 x 0.4 cm .  The   specimen is bisected,  inked blue at the resection margin and submitted   entirely in cassette [id number]   Part 2:   Pathology ID/Patient ID: [id number]   The specimen is received in formalin labeled "right arm".  The specimen   consists of a circular shave of pink-white skin measuring 0.9 x 0.8 cm .  The   specimen is trisected,  inked blue at the resection margin and submitted   entirely in cassette [id number]   VIRI Gerber MS   Microscopic Exam 1. Sections show a zone of irregular epidermal hyperplasia with   hypergranulosis, hyperkeratosis and underlying, vertically oriented papillary   dermal fibrosis.  The epidermis is extensively ulcerated/excoriated with   associated serum crust, neutrophils, and clusters of bacteria, with   underlying dermal fibrosis and mixed inflammation.  Ki 67 immunohistochemical   stain shows an elevated proliferative index limited to the basilar layers of   the intact epidermis.  Appropriately reactive controls were " reviewed.   Multiple levels were examined.   2. Sections show a zone of irregular epidermal hyperplasia with   hypergranulosis, hyperkeratosis and underlying, vertically oriented papillary   dermal fibrosis.   Disclaimer Unless the case is a 'gross only' or additional testing only, the final   diagnosis for each specimen is based on a microscopic examination of   appropriate tissue sections.   Resulting Agency [facility]        Narrative  Performed by: [facility]    Number of Specimens:->2   ------------------------->-------------------------   Spec 1 Procedure:->Biopsy   Spec 1 Clinical Impression:->prurigo papule r/o BCC   Spec 1 Source:->R cheek   ------------------------->-------------------------   Spec 2 Procedure:->Biopsy   Spec 2 Clinical Impression:->prurigo papule

## 2022-09-28 ENCOUNTER — PATIENT MESSAGE (OUTPATIENT)
Dept: PULMONOLOGY | Facility: CLINIC | Age: 52
End: 2022-09-28
Payer: COMMERCIAL

## 2023-03-06 ENCOUNTER — PATIENT MESSAGE (OUTPATIENT)
Dept: PULMONOLOGY | Facility: CLINIC | Age: 53
End: 2023-03-06
Payer: COMMERCIAL

## 2024-02-20 ENCOUNTER — CLINICAL SUPPORT (OUTPATIENT)
Dept: PULMONOLOGY | Facility: CLINIC | Age: 54
End: 2024-02-20
Payer: COMMERCIAL

## 2024-02-20 ENCOUNTER — HOSPITAL ENCOUNTER (OUTPATIENT)
Dept: CARDIOLOGY | Facility: HOSPITAL | Age: 54
Discharge: HOME | End: 2024-02-20
Payer: COMMERCIAL

## 2024-02-20 ENCOUNTER — OFFICE VISIT (OUTPATIENT)
Dept: PULMONOLOGY | Facility: CLINIC | Age: 54
End: 2024-02-20
Payer: COMMERCIAL

## 2024-02-20 ENCOUNTER — HOSPITAL ENCOUNTER (OUTPATIENT)
Dept: RADIOLOGY | Facility: HOSPITAL | Age: 54
Discharge: HOME | End: 2024-02-20
Attending: NURSE PRACTITIONER
Payer: COMMERCIAL

## 2024-02-20 VITALS — BODY MASS INDEX: 30.15 KG/M2 | HEIGHT: 75 IN | WEIGHT: 242.5 LBS

## 2024-02-20 VITALS
BODY MASS INDEX: 30.15 KG/M2 | HEART RATE: 63 BPM | HEIGHT: 75 IN | SYSTOLIC BLOOD PRESSURE: 120 MMHG | WEIGHT: 242.5 LBS | DIASTOLIC BLOOD PRESSURE: 80 MMHG | RESPIRATION RATE: 16 BRPM | OXYGEN SATURATION: 99 %

## 2024-02-20 DIAGNOSIS — R06.02 SOB (SHORTNESS OF BREATH) ON EXERTION: ICD-10-CM

## 2024-02-20 DIAGNOSIS — G47.33 OSA ON CPAP: Primary | Chronic | ICD-10-CM

## 2024-02-20 DIAGNOSIS — R91.8 GROUND GLASS OPACITY PRESENT ON IMAGING OF LUNG: ICD-10-CM

## 2024-02-20 DIAGNOSIS — E66.9 CLASS 1 OBESITY WITHOUT SERIOUS COMORBIDITY WITH BODY MASS INDEX (BMI) OF 30.0 TO 30.9 IN ADULT, UNSPECIFIED OBESITY TYPE: ICD-10-CM

## 2024-02-20 LAB
ALLENS TEST: ABNORMAL
DELSYS: ABNORMAL
FIO2: 21
HCO3 UR-SCNC: 24.4 MMOL/L (ref 24–28)
MODE: ABNORMAL
OHS QRS DURATION: 120 MS
OHS QTC CALCULATION: 427 MS
PCO2 BLDA: 39.5 MMHG (ref 35–45)
PH SMN: 7.4 [PH] (ref 7.35–7.45)
PO2 BLDA: 102 MMHG (ref 80–100)
POC BE: 0 MMOL/L
POC SATURATED O2: 98 % (ref 95–100)
SAMPLE: ABNORMAL
SITE: ABNORMAL

## 2024-02-20 PROCEDURE — 1160F RVW MEDS BY RX/DR IN RCRD: CPT | Mod: CPTII,S$GLB,, | Performed by: NURSE PRACTITIONER

## 2024-02-20 PROCEDURE — 99999 PR PBB SHADOW E&M-EST. PATIENT-LVL I: CPT | Mod: PBBFAC,,,

## 2024-02-20 PROCEDURE — 93010 ELECTROCARDIOGRAM REPORT: CPT | Mod: ,,, | Performed by: INTERNAL MEDICINE

## 2024-02-20 PROCEDURE — 36600 WITHDRAWAL OF ARTERIAL BLOOD: CPT | Mod: 59,S$GLB,, | Performed by: INTERNAL MEDICINE

## 2024-02-20 PROCEDURE — 71046 X-RAY EXAM CHEST 2 VIEWS: CPT | Mod: 26,,, | Performed by: RADIOLOGY

## 2024-02-20 PROCEDURE — 99999 PR PBB SHADOW E&M-EST. PATIENT-LVL IV: CPT | Mod: PBBFAC,,, | Performed by: NURSE PRACTITIONER

## 2024-02-20 PROCEDURE — 99999 PR PBB SHADOW E&M-EST. PATIENT-LVL II: CPT | Mod: PBBFAC,,,

## 2024-02-20 PROCEDURE — 94060 EVALUATION OF WHEEZING: CPT | Mod: 59,S$GLB,, | Performed by: INTERNAL MEDICINE

## 2024-02-20 PROCEDURE — 94618 PULMONARY STRESS TESTING: CPT | Mod: S$GLB,,, | Performed by: INTERNAL MEDICINE

## 2024-02-20 PROCEDURE — 3074F SYST BP LT 130 MM HG: CPT | Mod: CPTII,S$GLB,, | Performed by: NURSE PRACTITIONER

## 2024-02-20 PROCEDURE — 1159F MED LIST DOCD IN RCRD: CPT | Mod: CPTII,S$GLB,, | Performed by: NURSE PRACTITIONER

## 2024-02-20 PROCEDURE — 93005 ELECTROCARDIOGRAM TRACING: CPT

## 2024-02-20 PROCEDURE — 94729 DIFFUSING CAPACITY: CPT | Mod: S$GLB,,, | Performed by: INTERNAL MEDICINE

## 2024-02-20 PROCEDURE — 71046 X-RAY EXAM CHEST 2 VIEWS: CPT | Mod: TC

## 2024-02-20 PROCEDURE — 3079F DIAST BP 80-89 MM HG: CPT | Mod: CPTII,S$GLB,, | Performed by: NURSE PRACTITIONER

## 2024-02-20 PROCEDURE — 94799 UNLISTED PULMONARY SVC/PX: CPT | Mod: S$GLB,,, | Performed by: INTERNAL MEDICINE

## 2024-02-20 PROCEDURE — 94726 PLETHYSMOGRAPHY LUNG VOLUMES: CPT | Mod: S$GLB,,, | Performed by: INTERNAL MEDICINE

## 2024-02-20 PROCEDURE — 99215 OFFICE O/P EST HI 40 MIN: CPT | Mod: 25,S$GLB,, | Performed by: NURSE PRACTITIONER

## 2024-02-20 PROCEDURE — 82803 BLOOD GASES ANY COMBINATION: CPT | Mod: S$GLB,,, | Performed by: INTERNAL MEDICINE

## 2024-02-20 PROCEDURE — 3008F BODY MASS INDEX DOCD: CPT | Mod: CPTII,S$GLB,, | Performed by: NURSE PRACTITIONER

## 2024-02-20 RX ORDER — ALBUTEROL SULFATE 90 UG/1
2 AEROSOL, METERED RESPIRATORY (INHALATION) EVERY 4 HOURS PRN
Qty: 18 G | Refills: 11 | Status: SHIPPED | OUTPATIENT
Start: 2024-02-20

## 2024-02-20 NOTE — ASSESSMENT & PLAN NOTE
Encouraged calorie reduction and 30 minutes of exercise daily. Discussed impact of obesity on general health.  Wt Readings from Last 8 Encounters:   02/20/24 110 kg (242 lb 8.1 oz)   02/20/24 110 kg (242 lb 8.1 oz)   04/01/21 109 kg (240 lb 4.8 oz)   01/31/20 109.5 kg (241 lb 6.5 oz)   10/16/19 104.5 kg (230 lb 6.1 oz)   10/02/19 104.6 kg (230 lb 9.6 oz)   09/26/19 105.2 kg (232 lb)   09/20/19 105.4 kg (232 lb 5.8 oz)   Body mass index is 30.31 kg/m².

## 2024-02-20 NOTE — ASSESSMENT & PLAN NOTE
Benefits Auto CPAP 6-12 cm with AHI 5.8  Improve adherence   Nasal pillows mask  CANDIEE: [facility]  Updated supply order  Improve adherence

## 2024-02-20 NOTE — PROCEDURES
Clinical Guide to Interpretation or FeNO Levels :    FeNO  (ppb) LOW INTERMEDIATE HIGH   ADULT VALUES < 25 25-50          > 50   Th2-driven Inflammation Unlikely Likely Significant     Patients FeNO level at this visit : __17__ (ppb)    Interpretation of FeNO measurement in adults:  [x] FENO is less than 25 ppb implies non eosinophilic airway inflammation or the absence of airway inflammation.    Comment: Low FENO (<25 ppb) in adult asthmatics with persistent symptoms suggests other etiologies for these symptoms and a lower likelihood of benefit from adding or increasing inhaled glucocorticoids.    [] FENO between 25 and 50 ppb in adults should be interpreted cautiously with reference to the clinical situation (eg, symptomatic, on or off therapy, current smoking).    [] FENO greater than 50 ppb in adults  suggests eosinophilic airway inflammation   Comment: High FENO (>50 ppb) in adult asthmatics even with atypical symptoms suggests glucocorticoid responsiveness. High FENO (>50 ppb) can help identify poor adherence or uncontrolled inflammation in asthma patients with otherwise seemingly controlled asthma.    Discussion:  A FENO less than 25 ppb in adults and less than 20 ppb in children younger than 12 years of age implies noneosinophilic airway inflammation or the absence of airway inflammation.  A FENO greater than 50 ppb in adults or greater than 35 ppb in children suggests eosinophilic airway inflammation.   Values of FENO between 25 and 50 ppb in adults (20 to 35 ppb in children) should be interpreted cautiously with reference to the clinical situation (eg, symptomatic, on or off therapy, current smoking).  A rising FENO with a greater than 20 percent change and more than 25 ppb (20 ppb in children) from a previously stable level suggests increasing eosinophilic airway inflammation, but there are wide inter-individual differences.  A decrease in FENO greater than 20 percent for values over 50 ppb or more than  10 ppb for values less than 50 ppb may be clinically important.  ?FENO in other respiratory diseases - Several other diseases are associated with altered levels of exhaled NO: low levels of FENO have been noted in cystic fibrosis, current smoking, pulmonary hypertension, hypothermia, primary ciliary dyskinesia, and bronchopulmonary dysplasia. Elevated FENO has been noted in atopy, nonasthmatic eosinophilic bronchitis, COPD exacerbations, noncystic fibrosis bronchiectasis, and viral upper respiratory infections.    REFERENCE:  ATS Board of Directors, December 2004, and by the ERS Executive Committee, June 2004. ATS/ERS Recommendations for Standardized Procedures for the Online and Offline Measurement of Exhaled Lower Respiratory Nitric Oxide and Nasal Nitric Oxide. Guideline 2005

## 2024-02-20 NOTE — PROGRESS NOTES
Subjective:      Patient ID: Stephon Lynne is a 53 y.o. male.    Chief Complaint: Sleep Apnea and Shortness of Breath    HPI: Stephon Lynne is here for follow up for YURI with CPAP complaince assessment and recurrent shortness of breath.  Last seen 4/1/2021 by me.    Patient had prior shortness of breath in 2019 with cardiac and pulmonary evaluation with Dr. Lipscomb.   Ground glass opacity seen in CT scan imaging that improved on 8/2019 CT chest.   Determine patient had obstructive sleep apnea and once began treatment  chest pain and palpitations resolved.   He complains shortness of breath persisting over the past 2 years, chronic off and on with heavy exertion. He did see improvement in shortness of breath when he was using CPAP on a regular basis in 2021.     Shortness of breath exertion over past 2 weeks with light task of walking with trash can to put out garbage at home prompting today's visit.  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  He mainly works [employer], but does travel to clinics as needed.    2/20/204 We were able to arrange cpft/abg/6mwd/feno/ekg today, while doing 6mwd testing mid way developed level 4 left side chest pain, poking sensation over left pectoralis region.  Completed walk after sitting 2 minutes chest pain subsided to 2/10, then chest pain resolved.     2/20/2024 CPFT pending interpretation.       2/20/2024 FeNO 17, no inflammation of lungs suggested.    2/20/2024 ABG on room air was normal    2/20/2024 6MWD No desaturations requiring supplemental oxygen at rest or exertion. Exercise capacity is normal.    Dyspnea Characteristics:   Exertional Dyspnea   Dyspnea Duration:  Chronic  Dyspnea Severity:  MITRA 3  Dyspnea Timing:  exertional  Dyspnea Contributing Factors:  Deconditioning, bradycardia with 1st degree heart block, nonspecific intraventricular conduction delay has replaced  Incomplete right     Dyspnea Associated Symptoms:  Left chest pain with fast paced walking      Modified Chucho Dyspnea Scale      0  Nothing at all    0.5  Very, very slight (just noticeable)    1  Very slight    2  Slight    3  Moderate    4 Somewhat severe    5 Severe      6    7  Very severe    8    9   Very, very severe (almost maximal)  10  Maximal     He is on Auto CPAP of 6-12 cmH2O pressure which is optimally controlling sleep apnea with apneic index (AHI) 5.8 events an hour.   Complaince download today reveals 50% of days with greater than 4 hours of device use.   Patient reports benefit from CPAP use and denies snoring or excessive daytime sleepiness.    Patient reports no complaints. Nasal pillows mask is tolerated.    Other CPAP history  Original yuri diagnosis 2011 with [facility]. Used CPAP 2011 -2016. Stopped using since did not like pressure of 14 cm and failed to follow up to change.  Follow up in 2019 since developed chest pain with palpitations.   9/17/2019 Home Sleep Study moderate, non-positional obstructive sleep apnea (YURI). Overall AHI was 18.0 events per hour.   10/2/2019 orders per Dr. 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     Has albuterol inhaler on hand, since had shortness of breath on exertion, since resuming CPAP not long SOB, has not used albuterol in 3 months.     Compliance Summary  Min Pressure 6 cmH2O  Max Pressure 12 cmH2O  11/22/2023 - 2/19/2024 (90 days)  Days with Device Usage 58 days  Days without Device Usage 32 days  Percent Days with Device Usage 64.4%  Cumulative Usage 11 days 10 hrs. 32 mins. 51 secs.  Maximum Usage (1 Day) 9 hrs. 14 mins. 33 secs.  Average Usage (All Days) 3 hrs. 3 mins. 1 secs.  Average Usage (Days Used) 4 hrs. 44 mins.  Minimum Usage (1 Day) 33 mins. 20 secs.  Percent of Days with Usage >= 4  Hours 50.0%  Percent of Days with Usage < 4 Hours 50.0%  Date Range  Total Blower Time 11 days 10 hrs. 37 mins. 7 secs.  Average AHI 5.8  Auto-CPAP Summary  Auto-CPAP Mean Pressure 7.9 cmH2O  Auto-CPAP Peak Average Pressure 10.8 cmH2O  Device Pressure <= 90% of Time 10.7 cmH2O  Average Time in Large Leak Per Day 18 mins. 39 secs.    Huntington Sleepiness Scale       2/20/2024     7:53 AM 4/1/2021    11:00 AM 1/31/2020     2:00 PM 9/11/2019     2:00 PM   EPWORTH SLEEPINESS SCALE   Sitting and reading 1 1 0 3   Watching TV 1 1 0 2   Sitting, inactive in a public place (e.g. a theatre or a meeting) 0 0 0 0   As a passenger in a car for an hour without a break 0 1 0 2   Lying down to rest in the afternoon when circumstances permit 0 1 1 2   Sitting and talking to someone 0 0 0 0   Sitting quietly after a lunch without alcohol 0 0 1 0   In a car, while stopped for a few minutes in traffic 0 0 0 0   Total score 2 4 2 9       Previous Report Reviewed: lab reports and office notes     Past Medical History: The following portions of the patient's history were reviewed and updated as appropriate:   He  has a past surgical history that includes Eye surgery; hydrocele; Hernia repair; and Left heart catheterization (Left, 9/26/2019).  His family history includes Aortic aneurysm in his mother; Heart attack in his brother; Hyperlipidemia in his father; Pulmonary embolism in his sister and sister.  He  reports that he quit smoking about 22 years ago. His smoking use included cigarettes. He started smoking about 34 years ago. He has a 18.0 pack-year smoking history. He has never used smokeless tobacco. He reports that he does not currently use alcohol. He reports that he does not use drugs.  He has a current medication list which includes the following prescription(s): brimonidine 0.2%, mupirocin, albuterol, aspirin, cyanocobalamin (vitamin b-12), ondansetron, and mohit's wort.  He has No Known Allergies..    The following portions  "of the patient's history were reviewed and updated as appropriate: allergies, current medications, past family history, past medical history, past social history, past surgical history and problem list.    Review of Systems   Constitutional:  Negative for fever, chills, weight loss, weight gain, activity change, appetite change, fatigue and night sweats.   HENT:  Negative for postnasal drip, rhinorrhea, sinus pressure, voice change and congestion.    Eyes:  Negative for redness and itching.   Respiratory:  Negative for snoring, cough, sputum production, chest tightness, shortness of breath, wheezing, orthopnea, asthma nighttime symptoms, dyspnea on extertion, use of rescue inhaler and somnolence.    Cardiovascular: Negative.  Negative for chest pain, palpitations and leg swelling.   Genitourinary:  Negative for difficulty urinating and hematuria.   Endocrine:  Negative for cold intolerance and heat intolerance.    Musculoskeletal:  Negative for arthralgias, gait problem, joint swelling and myalgias.   Skin: Negative.    Gastrointestinal:  Negative for nausea, vomiting, abdominal pain and acid reflux.   Neurological:  Negative for dizziness, weakness, light-headedness and headaches.   Hematological:  Negative for adenopathy. No excessive bruising.   All other systems reviewed and are negative.     Objective:   /80   Pulse 63   Resp 16   Ht 6' 3" (1.905 m)   Wt 110 kg (242 lb 8.1 oz)   SpO2 99%   BMI 30.31 kg/m²   Physical Exam  Vitals and nursing note reviewed.   Constitutional:       General: He is not in acute distress.     Appearance: He is well-developed. He is not ill-appearing or toxic-appearing.   HENT:      Head: Normocephalic and atraumatic.      Right Ear: External ear normal.      Left Ear: External ear normal.      Nose: Nose normal.      Mouth/Throat:      Pharynx: No oropharyngeal exudate.   Eyes:      Conjunctiva/sclera: Conjunctivae normal.   Cardiovascular:      Rate and Rhythm: Normal " rate and regular rhythm.      Heart sounds: Normal heart sounds.   Pulmonary:      Effort: Pulmonary effort is normal.      Breath sounds: Normal breath sounds.   Abdominal:      Palpations: Abdomen is soft.   Musculoskeletal:      Cervical back: Normal range of motion and neck supple.   Skin:     General: Skin is warm and dry.   Neurological:      Mental Status: He is alert and oriented to person, place, and time.   Psychiatric:         Behavior: Behavior normal. Behavior is cooperative.         Thought Content: Thought content normal.         Judgment: Judgment normal.         Personal Diagnostic Review  CPAP download    2/20/2023 CPFT pending interpretation    Clinical Guide to Interpretation or FeNO Levels :     FeNO  (ppb) LOW INTERMEDIATE HIGH   ADULT VALUES < 25 25-50          > 50   Th2-driven Inflammation Unlikely Likely Significant      Patients FeNO level at this visit : __17__ (ppb)   FENO is less than 25 ppb implies non eosinophilic airway inflammation or the absence of airway inflammation.               Comment: Low FENO (<25 ppb) in adult asthmatics with persistent symptoms suggests other etiologies for these symptoms and a lower likelihood of benefit from adding or increasing inhaled glucocorticoids.     2/20/2024 ABG on room air was normal   Latest Reference Range & Units 02/20/24 08:43   POC PH 7.35 - 7.45  7.397   POC PCO2 35 - 45 mmHg 39.5   POC PO2 80 - 100 mmHg 102 (H)   POC HCO3 24 - 28 mmol/L 24.4   POC SATURATED O2 95 - 100 % 98   Sample  ARTERIAL   POC BE -2 to 2 mmol/L 0   FiO2  21   DelSys  Room Air   Site  RR   Mode  SPONT   (H): Data is abnormally high    2/20/2024 6MWD No desaturations requiring supplemental oxygen at rest or exertion. Exercise capacity is normal.  Phase Oxygen Assessment Supplemental O2 Heart   Rate Blood Pressure Chucho Dyspnea Scale Rating   Resting 98 % Room Air 52 bpm 120/82 0   Exercise             Minute             1 98 % Room Air 87 bpm       2 97 % Room Air 91  bpm       3 98 % Room Air 92 bpm       4 98 % Room Air 92 bpm       5 98 % Room Air 92 bpm       6  98 % Room Air 95 bpm 144/81 5-6   Recovery             Minute             1 99 % Room Air 69 bpm       2 99 % Room Air 65 bpm       3 100 % Room Air 60 bpm       4 100 % Room Air 62 bpm 134/81 2      Six Minute Walk Summary  6MWT Status: completed without stopping  Patient Reported: Dyspnea (chest discomfort)               Interpretation:  Did the patient stop or pause?: No  Total Time Walked (Calculated): 360 seconds  Final Partial Lap Distance (feet): 0 feet  Total Distance Meters (Calculated): 548.64 meters  Predicted Distance Meters (Calculated): 683.47 meters  Percentage of Predicted (Calculated): 80.27  Peak VO2 (Calculated): 20.44  Mets: 5.84  Has The Patient Had a Previous Six Minute Walk Test?: No     Previous 6MWT Results  Has The Patient Had a Previous Six Minute Walk Test?: No     2/20/2024 EKG   Sinus bradycardia with 1st degree A-V block   Left axis deviation   Nonspecific intraventricular conduction delay   Abnormal ECG   When compared with ECG of 18-SEP-2019 14:49,   Nonspecific intraventricular conduction delay has replaced Incomplete right   bundle branch block   Confirmed by MAGDALENA PADILLA MD ([id number]) on 2/20/2024 10:58:58 AM       Home Sleep Study  9/17/2019  PHYSICIAN INTERPRETATION AND COMMENTS: Findings are consistent with moderate, non-positional obstructive  sleep apnea (YURI). Overall AHI was 18.0 events per hour. Minimum oxygen saturation during study was 81.5%. Loud snoring  recorded. Treatment is indicated. Intervention with CPAP. Auto PAP 5-20 cm water pressure with suitable mask. Close  follow-up to ensure resolution of symptoms.    9/11/2019 CT Chest   EXAMINATION:  CT CHEST WITHOUT CONTRAST     CLINICAL HISTORY:  Chest wall pain;Dyspnea chronic, prior xray;Right upper lobe infiltrate vs mass; Other chest pain     TECHNIQUE:  Low dose axial images, sagittal and coronal reformations were  obtained from the thoracic inlet to the lung bases. Contrast was not administered.     COMPARISON:  07/04/2019     FINDINGS:  Heart and Great vessels: Within normal limits     Thoracic Adenopathy: None demonstrated     Lungs: There is pleural/parenchymal scarring in the bilateral apices, greater on the right.  Hazy reticulonodular infiltrates seen in the right upper lobe on prior appears improved and nearly resolved.  There is an area of ill-defined ground-glass opacity in the medial right lower lobe which appears to have aberrant arterial vasculature rising from the aorta, in keeping with a pulmonary sequestration.  No definite associated separate pleural lining, suggesting interlobular type.  There is a 5 mm noncalcified subpleural pulmonary nodule present in the anterior right middle lobe which is unchanged.  No new pulmonary findings.     Upper Abdomen: No acute findings     Bones: There is mild pectus excavatum deformity with associated narrowed AP diameter of the mediastinum.     Miscellaneous: None     Impression:     1. Hazy reticulonodular infiltrates in the right upper lobe on prior study have nearly resolved.  There is some prominent residual pleural and parenchymal scarring in the bilateral lung apices.  2. Findings in keeping with pulmonary sequestration involving the right lower lobe, favored to be interlobular.  3. Unchanged 5 mm subpleural pulmonary nodule in the right upper lobe.  For a solid nodule <6 mm, Fleischner Society 2017 guidelines recommend no routine follow up for a low risk patient, or follow-up with non-contrast chest CT at 12 months in a high risk patient.        Electronically signed by: Mat Naidu MD  Date:                                            09/11/2019  Time:                                           14:50  Assessment:     1. YURI on CPAP    2. SOB (shortness of breath) on exertion    3. Ground glass opacity present on imaging of lung    4. Class 1 obesity without  serious comorbidity with body mass index (BMI) of 30.0 to 30.9 in adult, unspecified obesity type          Orders Placed This Encounter   Procedures    CPAP/BIPAP SUPPLIES     Benefits   90 day supply. 4 refills  HME: [facility]     Order Specific Question:   Length of need (1-99 months):     Answer:   99     Order Specific Question:   Choose ONE mask type and its corresponding cushions and/or pillows:     Answer:    Nasal Mask, 1 per 90 days:  Nasal Cushions, (6 per 90 days):  Nasal Pillows, (6 per 90 days)     Order Specific Question:   Choose EITHER Heated or Non-Heated Tubjing     Answer:    Non-Heated Tubing, 1 per 90 days     Order Specific Question:   Number of Days Needed:     Answer:   99     Order Specific Question:   All other supplies as needed as listed below:     Answer:    Headgear, 1 per 180 days     Order Specific Question:   All other supplies as needed as listed below:     Answer:    Chin Strap, 1 per 180 days     Order Specific Question:   All other supplies as needed as listed below:     Answer:    Disposable Filter, 6 per 90 days     Order Specific Question:   All other supplies as needed as listed below:     Answer:    Humidifier Chamber, 1 per 180 days     Order Specific Question:   All other supplies as needed as listed below:     Answer:    Non-Disposable Filter, 1 per 180 days    X-Ray Chest PA And Lateral     Standing Status:   Future     Number of Occurrences:   1     Standing Expiration Date:   2/20/2025     Order Specific Question:   May the Radiologist modify the order per protocol to meet the clinical needs of the patient?     Answer:   Yes     Order Specific Question:   Release to patient     Answer:   Immediate    CT Chest Without Contrast     Standing Status:   Future     Standing Expiration Date:   2/20/2025     Order Specific Question:   May the Radiologist modify the order per protocol to meet the clinical needs of the patient?      Answer:   Yes     Order Specific Question:   Does the patient wear a continuous glucose monitor?     Answer:   No    EKG 12-lead     Standing Status:   Future     Number of Occurrences:   1     Standing Expiration Date:   2025    Fraction of  Nitric Oxide     Standing Status:   Future     Number of Occurrences:   1     Standing Expiration Date:   2025     Order Specific Question:   Has the patient been diagnosed with Asthma or is a suspected asthmatic?     Answer:   Yes     Order Specific Question:   Is the patient currently on inhaled corticosteroids?     Answer:   No     Order Specific Question:   Release to patient     Answer:   Immediate    PFT - related Arterial Blood Gas     Standing Status:   Future     Number of Occurrences:   1     Standing Expiration Date:   2025     Order Specific Question:   Release to patient     Answer:   Immediate    Stress test, pulmonary     Standing Status:   Future     Number of Occurrences:   1     Standing Expiration Date:   2025     Order Specific Question:   Reason for study     Answer:   Functional status     Order Specific Question:   Release to patient     Answer:   Immediate    Complete PFT with bronchodilator     Standing Status:   Future     Number of Occurrences:   1     Standing Expiration Date:   2025     Order Specific Question:   Release to patient     Answer:   Immediate     Plan:     Problem List Items Addressed This Visit       YURI on CPAP - Primary (Chronic)     Benefits Auto CPAP 6-12 cm with AHI 5.8  Improve adherence   Nasal pillows mask  HME: [facility]  Updated supply order  Improve adherence          Relevant Orders    CPAP/BIPAP SUPPLIES    SOB (shortness of breath) on exertion     normal oxygenation abg,6mwd, cxr clear. exam normal. lt chst pain fu cardio. proceed with CT chest.          Relevant Medications    albuterol (PROVENTIL/VENTOLIN HFA) 90 mcg/actuation inhaler    Other Relevant Orders    Fraction of  Nitric  Oxide (Completed)    X-Ray Chest PA And Lateral (Completed)    PFT - related Arterial Blood Gas (Completed)    Stress test, pulmonary (Completed)    Complete PFT with bronchodilator (Completed)    EKG 12-lead (Completed)    CT Chest Without Contrast    Class 1 obesity without serious comorbidity with body mass index (BMI) of 30.0 to 30.9 in adult     Encouraged calorie reduction and 30 minutes of exercise daily. Discussed impact of obesity on general health.  Wt Readings from Last 8 Encounters:   02/20/24 110 kg (242 lb 8.1 oz)   02/20/24 110 kg (242 lb 8.1 oz)   04/01/21 109 kg (240 lb 4.8 oz)   01/31/20 109.5 kg (241 lb 6.5 oz)   10/16/19 104.5 kg (230 lb 6.1 oz)   10/02/19 104.6 kg (230 lb 9.6 oz)   09/26/19 105.2 kg (232 lb)   09/20/19 105.4 kg (232 lb 5.8 oz)   Body mass index is 30.31 kg/m².              Other Visit Diagnoses       Ground glass opacity present on imaging of lung        seen 2019, sob. fu repeat CT chest without contrast    Relevant Orders    CT Chest Without Contrast          I spent a total of 60 minutes on the day of the visit.  This includes face to face time and non-face to face time preparing to see the patient (eg, review of tests), obtaining and/or reviewing separately obtained history, documenting clinical information in the electronic or other health record, independently interpreting results and communicating results to the patient/family/caregiver, or care coordinator.     (DME) - [facility]  Reviewed therapeutic goals for positive airway pressure therapy Auto CPAP  Ideal is usage 100% of nights for 6 - 8 hours per night. Minimum usage is 70% of night for at least 4 hours per night used.     Follow up for next available CT chest. 6 mo fu CPAP dnld. FU cardio chest pain/sob. .

## 2024-02-20 NOTE — PROCEDURES
ABG completed. See ABG Below.     Latest Reference Range & Units 02/20/24 08:43   POC PH 7.35 - 7.45  7.397   POC PCO2 35 - 45 mmHg 39.5   POC PO2 80 - 100 mmHg 102 (H)   POC HCO3 24 - 28 mmol/L 24.4   POC SATURATED O2 95 - 100 % 98   Sample  ARTERIAL   POC BE -2 to 2 mmol/L 0   FiO2  21   DelSys  Room Air   Site  RR   Mode  SPONT   (H): Data is abnormally high      Interpretation:  [x] Arterial blood gases on room air demonstrate normal pCO2 and pO2      The table below summarizes the main interpretations of the relationship between the arterial blood gases, pH, pCO2 and HCO-3    []    I

## 2024-02-20 NOTE — ASSESSMENT & PLAN NOTE
normal oxygenation abg,6mwd, cxr clear. exam normal. lt chst pain fu cardio. proceed with CT chest.

## 2024-02-20 NOTE — PROCEDURES
"[facility] 3rdFl  Six Minute Walk     SUMMARY     Ordering Provider: Anjali Dominguez NP   Interpreting Provider: Modesto Lipscomb MD  Performing nurse/tech/RT: VT, RT  Diagnosis:  (SOBOE)  Height: 6' 3" (190.5 cm)  Weight: 110 kg (242 lb 8.1 oz)  BMI (Calculated): 30.3   Patient Race:             Phase Oxygen Assessment Supplemental O2 Heart   Rate Blood Pressure Chucho Dyspnea Scale Rating   Resting 98 % Room Air 52 bpm 120/82 0   Exercise        Minute        1 98 % Room Air 87 bpm     2 97 % Room Air 91 bpm     3 98 % Room Air 92 bpm     4 98 % Room Air 92 bpm     5 98 % Room Air 92 bpm     6  98 % Room Air 95 bpm 144/81 5-6   Recovery        Minute        1 99 % Room Air 69 bpm     2 99 % Room Air 65 bpm     3 100 % Room Air 60 bpm     4 100 % Room Air 62 bpm 134/81 2     Six Minute Walk Summary  6MWT Status: completed without stopping  Patient Reported: Dyspnea (chest discomfort)     Interpretation:  Did the patient stop or pause?: No                                         Total Time Walked (Calculated): 360 seconds  Final Partial Lap Distance (feet): 0 feet  Total Distance Meters (Calculated): 548.64 meters  Predicted Distance Meters (Calculated): 683.47 meters  Percentage of Predicted (Calculated): 80.27  Peak VO2 (Calculated): 20.44  Mets: 5.84  Has The Patient Had a Previous Six Minute Walk Test?: No       Previous 6MWT Results  Has The Patient Had a Previous Six Minute Walk Test?: No    Interpretation:  Total distance walked in six minutes is normal indicating normal functional capacity.   Patient did not meet criteria for oxygen prescription. Clinical correlation suggested.    Medicare Criteria for oxygen prescription comments:   When arterial oxygen saturation is at or below 88% during exercise on room air (severe exercise induced hypoxemia) then the patient falls under Medicare Group 1 criteria for supplemental oxygen prescription.  Details about Medicare Group Criteria " coverage can be found at http://www.cms.[facility].gov/manuals/downloads/     Modesto Lipscomb MD

## 2024-02-21 ENCOUNTER — OFFICE VISIT (OUTPATIENT)
Dept: CARDIOLOGY | Facility: CLINIC | Age: 54
End: 2024-02-21
Payer: COMMERCIAL

## 2024-02-21 VITALS
HEART RATE: 79 BPM | SYSTOLIC BLOOD PRESSURE: 108 MMHG | HEIGHT: 75 IN | OXYGEN SATURATION: 97 % | DIASTOLIC BLOOD PRESSURE: 68 MMHG | BODY MASS INDEX: 29.58 KG/M2 | WEIGHT: 237.88 LBS

## 2024-02-21 DIAGNOSIS — R06.02 SOB (SHORTNESS OF BREATH) ON EXERTION: ICD-10-CM

## 2024-02-21 DIAGNOSIS — R94.39 ABNORMAL STRESS TEST: ICD-10-CM

## 2024-02-21 DIAGNOSIS — R93.1 ABNORMAL ECHOCARDIOGRAM: ICD-10-CM

## 2024-02-21 DIAGNOSIS — N40.0 PROSTATISM: ICD-10-CM

## 2024-02-21 DIAGNOSIS — R94.31 ABNORMAL EKG: Primary | ICD-10-CM

## 2024-02-21 DIAGNOSIS — E66.1 CLASS 1 DRUG-INDUCED OBESITY WITHOUT SERIOUS COMORBIDITY WITH BODY MASS INDEX (BMI) OF 30.0 TO 30.9 IN ADULT: ICD-10-CM

## 2024-02-21 DIAGNOSIS — G47.33 OSA ON CPAP: Chronic | ICD-10-CM

## 2024-02-21 DIAGNOSIS — E22.0 ACROMEGALY: ICD-10-CM

## 2024-02-21 DIAGNOSIS — Z82.49 FAMILY HISTORY OF PREMATURE CAD: ICD-10-CM

## 2024-02-21 LAB
BRPFT: NORMAL
DLCO ADJ PRE: 32.41 ML/(MIN*MMHG)
DLCO SINGLE BREATH LLN: 28.38
DLCO SINGLE BREATH PRE REF: 91.8 %
DLCO SINGLE BREATH REF: 35.31
DLCOC SBVA LLN: 3.27
DLCOC SBVA PRE REF: 119.8 %
DLCOC SBVA REF: 4.32
DLCOC SINGLE BREATH LLN: 28.38
DLCOC SINGLE BREATH PRE REF: 91.8 %
DLCOC SINGLE BREATH REF: 35.31
DLCOVA LLN: 3.27
DLCOVA PRE REF: 119.8 %
DLCOVA PRE: 5.17 ML/(MIN*MMHG*L)
DLCOVA REF: 4.32
DLVAADJ PRE: 5.17 ML/(MIN*MMHG*L)
ERV LLN: -16448.56
ERV PRE REF: 84.5 %
ERV REF: 1.44
FEF 25 75 CHG: 16.1 %
FEF 25 75 LLN: 2.12
FEF 25 75 POST REF: 62.7 %
FEF 25 75 PRE REF: 54 %
FEF 25 75 REF: 3.96
FET100 CHG: -12.8 %
FEV1 CHG: -7.2 %
FEV1 FVC CHG: 14.4 %
FEV1 FVC LLN: 67
FEV1 FVC POST REF: 95.5 %
FEV1 FVC PRE REF: 83.4 %
FEV1 FVC REF: 78
FEV1 LLN: 3.55
FEV1 POST REF: 70.3 %
FEV1 PRE REF: 75.7 %
FEV1 REF: 4.56
FRCPLETH LLN: 2.85
FRCPLETH PREREF: 131.1 %
FRCPLETH REF: 3.84
FVC CHG: -18.9 %
FVC LLN: 4.58
FVC POST REF: 73.2 %
FVC PRE REF: 90.2 %
FVC REF: 5.88
IVC PRE: 4.32 L
IVC SINGLE BREATH LLN: 4.58
IVC SINGLE BREATH PRE REF: 73.4 %
IVC SINGLE BREATH REF: 5.88
MVV LLN: 144
MVV PRE REF: 47.1 %
MVV REF: 170
PEF CHG: -18.4 %
PEF LLN: 8.32
PEF POST REF: 69.4 %
PEF PRE REF: 85.1 %
PEF REF: 10.99
POST FEF 25 75: 2.48 L/S
POST FET 100: 7.96 SEC
POST FEV1 FVC: 74.45 %
POST FEV1: 3.21 L
POST FVC: 4.31 L
POST PEF: 7.63 L/S
PRE DLCO: 32.41 ML/(MIN*MMHG)
PRE ERV: 1.22 L
PRE FEF 25 75: 2.14 L/S
PRE FET 100: 9.13 SEC
PRE FEV1 FVC: 65.07 %
PRE FEV1: 3.45 L
PRE FRC PL: 5.03 L
PRE FVC: 5.31 L
PRE MVV: 80 L/MIN
PRE PEF: 9.36 L/S
PRE RV: 3.99 L
PRE TLC: 9.31 L
RAW LLN: 3.06
RAW PRE REF: 100.7 %
RAW PRE: 3.08 CMH2O*S/L
RAW REF: 3.06
RV LLN: 1.72
RV PRE REF: 166.5 %
RV REF: 2.39
RVTLC LLN: 25
RVTLC PRE REF: 126.5 %
RVTLC PRE: 42.81 %
RVTLC REF: 34
TLC LLN: 7.03
TLC PRE REF: 113.8 %
TLC REF: 8.18
VA PRE: 6.27 L
VA SINGLE BREATH LLN: 8.03
VA SINGLE BREATH PRE REF: 78.1 %
VA SINGLE BREATH REF: 8.03
VC LLN: 4.58
VC PRE REF: 90.5 %
VC PRE: 5.33 L
VC REF: 5.88
VTGRAWPRE: 4.34 L

## 2024-02-21 PROCEDURE — 3074F SYST BP LT 130 MM HG: CPT | Mod: CPTII,S$GLB,, | Performed by: INTERNAL MEDICINE

## 2024-02-21 PROCEDURE — 1159F MED LIST DOCD IN RCRD: CPT | Mod: CPTII,S$GLB,, | Performed by: INTERNAL MEDICINE

## 2024-02-21 PROCEDURE — 99205 OFFICE O/P NEW HI 60 MIN: CPT | Mod: S$GLB,,, | Performed by: INTERNAL MEDICINE

## 2024-02-21 PROCEDURE — 1160F RVW MEDS BY RX/DR IN RCRD: CPT | Mod: CPTII,S$GLB,, | Performed by: INTERNAL MEDICINE

## 2024-02-21 PROCEDURE — 3078F DIAST BP <80 MM HG: CPT | Mod: CPTII,S$GLB,, | Performed by: INTERNAL MEDICINE

## 2024-02-21 PROCEDURE — 99999 PR PBB SHADOW E&M-EST. PATIENT-LVL IV: CPT | Mod: PBBFAC,,, | Performed by: INTERNAL MEDICINE

## 2024-02-21 PROCEDURE — 3008F BODY MASS INDEX DOCD: CPT | Mod: CPTII,S$GLB,, | Performed by: INTERNAL MEDICINE

## 2024-02-21 NOTE — PROGRESS NOTES
Subjective:   Patient ID:  Stephon Lynne is a 53 y.o. male who presents for evaluation of No chief complaint on file.      HPI  A 54 yo male with h/o acromegaly fh cad premature  heart cath in 2019 for shortness of breath with normal coronaries due to continued symptoms. Who is here today because of recurrent exertional shortness of breath `over the past 3 months . He has noticed exertional symptoms with carrying garbage has pulmonary eval during 6 minute walk test had symptoms of chest tightness and shortness of breath w/o any change in o2 sat .he stopped symptoms resolved. He has been using his cpap machine less than before.   His EKG showed a wider qrs. He has a first degree av block.  Past Medical History:   Diagnosis Date    Abnormal echocardiogram 2019    Abnormal EKG 2024    Acromegaly     Congenital pectus excavatum     Family history of premature CAD 2019    Hypogonadism in male     Near syncope 2019    Other chest pain 2019    Sleep apnea        Past Surgical History:   Procedure Laterality Date    EYE SURGERY      HERNIA REPAIR      hydrocele      LEFT HEART CATHETERIZATION Left 2019    Procedure: CATHETERIZATION, HEART, LEFT/ptca;  Surgeon: Marilyn Seaman MD;  Location: [facility] CATH LAB;  Service: Cardiology;  Laterality: Left;  left radial approach       Social History     Tobacco Use    Smoking status: Former     Current packs/day: 0.00     Average packs/day: 1.5 packs/day for 12.0 years (18.0 ttl pk-yrs)     Types: Cigarettes     Start date: 1990     Quit date: 2002     Years since quittin.1    Smokeless tobacco: Never   Substance Use Topics    Alcohol use: Not Currently     Alcohol/week: 0.0 standard drinks of alcohol    Drug use: No       Family History   Problem Relation Age of Onset    Aortic aneurysm Mother     Hyperlipidemia Father     Pulmonary embolism Sister     Pulmonary embolism Sister     Heart attack Brother        Current Outpatient  Medications   Medication Sig    albuterol (PROVENTIL/VENTOLIN HFA) 90 mcg/actuation inhaler Inhale 2 puffs into the lungs every 4 (four) hours as needed for Shortness of Breath. Rescue    brimonidine 0.2% (ALPHAGAN) 0.2 % Drop Place one drop in both eyes twice daily    mupirocin (BACTROBAN) 2 % ointment Apply topically 2 (two) times daily.    aspirin (ECOTRIN) 81 MG EC tablet Take 81 mg by mouth once daily.    cyanocobalamin, vitamin B-12, (VITAMIN B-12) 1,000 mcg TbSR Take 1 tablet by mouth once daily.    ondansetron (ZOFRAN-ODT) 4 MG TbDL Take 1 tablet (4 mg total) by mouth every 6 (six) hours as needed.    DON'S WORT ORAL Take 1 tablet by mouth once daily. Advised to hold     No current facility-administered medications for this visit.     Current Outpatient Medications on File Prior to Visit   Medication Sig    albuterol (PROVENTIL/VENTOLIN HFA) 90 mcg/actuation inhaler Inhale 2 puffs into the lungs every 4 (four) hours as needed for Shortness of Breath. Rescue    brimonidine 0.2% (ALPHAGAN) 0.2 % Drop Place one drop in both eyes twice daily    mupirocin (BACTROBAN) 2 % ointment Apply topically 2 (two) times daily.    aspirin (ECOTRIN) 81 MG EC tablet Take 81 mg by mouth once daily.    cyanocobalamin, vitamin B-12, (VITAMIN B-12) 1,000 mcg TbSR Take 1 tablet by mouth once daily.    ondansetron (ZOFRAN-ODT) 4 MG TbDL Take 1 tablet (4 mg total) by mouth every 6 (six) hours as needed.    DON'S WORT ORAL Take 1 tablet by mouth once daily. Advised to hold     No current facility-administered medications on file prior to visit.       Review of patient's allergies indicates:  No Known Allergies    Review of Systems   Constitutional: Negative for malaise/fatigue.   Eyes:  Negative for blurred vision.   Cardiovascular:  Positive for chest pain and dyspnea on exertion. Negative for claudication, cyanosis, irregular heartbeat, leg swelling, near-syncope, orthopnea, palpitations and paroxysmal nocturnal dyspnea.    Respiratory:  Positive for shortness of breath. Negative for cough and hemoptysis.    Hematologic/Lymphatic: Negative for bleeding problem. Does not bruise/bleed easily.   Skin:  Negative for dry skin and itching.   Musculoskeletal:  Negative for falls, muscle weakness and myalgias.   Gastrointestinal:  Negative for abdominal pain, diarrhea, heartburn, hematemesis, hematochezia and melena.   Genitourinary:  Negative for flank pain and hematuria.   Neurological:  Negative for dizziness, focal weakness, headaches, light-headedness, numbness, paresthesias, seizures and weakness.   Psychiatric/Behavioral:  Negative for altered mental status and memory loss. The patient is not nervous/anxious.    Allergic/Immunologic: Negative for hives.       Objective:   Physical Exam  Vitals and nursing note reviewed.   Constitutional:       General: He is not in acute distress.     Appearance: He is well-developed. He is not diaphoretic.   HENT:      Head: Normocephalic and atraumatic.   Eyes:      General:         Right eye: No discharge.         Left eye: No discharge.      Pupils: Pupils are equal, round, and reactive to light.   Neck:      Thyroid: No thyromegaly.      Vascular: No JVD.   Cardiovascular:      Rate and Rhythm: Normal rate and regular rhythm.      Pulses: Normal pulses and intact distal pulses.      Heart sounds: Normal heart sounds. No murmur heard.     No friction rub. No gallop.   Pulmonary:      Effort: Pulmonary effort is normal. No respiratory distress.      Breath sounds: Normal breath sounds. No wheezing or rales.   Chest:      Chest wall: No tenderness.   Abdominal:      General: Bowel sounds are normal. There is no distension.      Palpations: Abdomen is soft.      Tenderness: There is no abdominal tenderness.   Musculoskeletal:         General: Normal range of motion.      Cervical back: Neck supple.      Right lower leg: No edema.      Left lower leg: No edema.   Skin:     General: Skin is warm and  "dry.      Findings: No erythema or rash.   Neurological:      General: No focal deficit present.      Mental Status: He is alert and oriented to person, place, and time.      Cranial Nerves: No cranial nerve deficit.   Psychiatric:         Mood and Affect: Mood normal.         Behavior: Behavior normal.       Vitals:    02/21/24 1633 02/21/24 1635   BP: 114/62 108/68   BP Location: Right arm Left arm   Patient Position: Sitting Sitting   BP Method: Large (Manual) Large (Manual)   Pulse: 79    SpO2: 97%    Weight: 107.9 kg (237 lb 14 oz)    Height: 6' 3" (1.905 m)      Lab Results   Component Value Date    CHOL 139 05/09/2017    CHOL 126 09/01/2015     Body mass index is 29.73 kg/m².   Lab Results   Component Value Date    HGBA1C 5.2 09/01/2015      BMP  Lab Results   Component Value Date     09/20/2019    K 3.8 09/20/2019     09/20/2019    CO2 26 09/20/2019    BUN 22 (H) 09/20/2019    CREATININE 1.0 09/20/2019    CALCIUM 9.3 09/20/2019    ANIONGAP 9 09/20/2019      Lab Results   Component Value Date    HDL 43 05/09/2017    HDL 51 09/01/2015     Lab Results   Component Value Date    LDLCALC 80.2 05/09/2017    LDLCALC 61.0 (L) 09/01/2015     Lab Results   Component Value Date    TRIG 79 05/09/2017    TRIG 70 09/01/2015     Lab Results   Component Value Date    CHOLHDL 30.9 05/09/2017    CHOLHDL 40.5 09/01/2015       Chemistry        Component Value Date/Time     09/20/2019 1715    K 3.8 09/20/2019 1715     09/20/2019 1715    CO2 26 09/20/2019 1715    BUN 22 (H) 09/20/2019 1715    CREATININE 1.0 09/20/2019 1715    GLU 87 09/20/2019 1715        Component Value Date/Time    CALCIUM 9.3 09/20/2019 1715    ALKPHOS 60 07/04/2019 1126    AST 15 07/04/2019 1126    ALT 16 07/04/2019 1126    BILITOT 2.7 (H) 07/04/2019 1126    ESTGFRAFRICA >60.0 09/20/2019 1715    EGFRNONAA >60.0 09/20/2019 1715          Lab Results   Component Value Date    TSH 1.376 08/07/2019     Lab Results   Component Value Date    " INR 1.0 09/20/2019     Lab Results   Component Value Date    WBC 5.97 09/20/2019    HGB 12.9 (L) 09/20/2019    HCT 39.1 (L) 09/20/2019    MCV 92 09/20/2019     09/20/2019     BNP  @LABRCNTIP(BNP,BNPTRIAGEBLO)@  CrCl cannot be calculated (Patient's most recent lab result is older than the maximum 7 days allowed.).  Reviewed w/u from 2019 as well as the pulmonary w/u from yesterday.   Assessment:     1. Abnormal EKG    2. YURI on CPAP    3. Acromegaly    4. Prostatism    5. Abnormal echocardiogram    6. Family history of premature CAD    7. Abnormal stress test    8. Class 1 drug-induced obesity without serious comorbidity with body mass index (BMI) of 30.0 to 30.9 in adult    9. SOB (shortness of breath) on exertion    He has exertional symptoms in the setting of widening of his qrs and has first degree av block I am concerned that he may have myocardial disease progression since he had a low normal ef on cath in 2019. In addition will make sure he has ni ischemia by repeating cardiolite and comparing to previous then make  further decisions accordingly    Plan:   Echo   Stress cardiolite  Cmp cbc bnp tsh vit d  Phone review and make decision on further care.

## 2024-02-22 ENCOUNTER — TELEPHONE (OUTPATIENT)
Dept: CARDIOLOGY | Facility: HOSPITAL | Age: 54
End: 2024-02-22
Payer: COMMERCIAL

## 2024-02-22 ENCOUNTER — LAB VISIT (OUTPATIENT)
Dept: LAB | Facility: HOSPITAL | Age: 54
End: 2024-02-22
Attending: INTERNAL MEDICINE
Payer: COMMERCIAL

## 2024-02-22 DIAGNOSIS — Z82.49 FAMILY HISTORY OF PREMATURE CAD: ICD-10-CM

## 2024-02-22 DIAGNOSIS — E66.1 CLASS 1 DRUG-INDUCED OBESITY WITHOUT SERIOUS COMORBIDITY WITH BODY MASS INDEX (BMI) OF 30.0 TO 30.9 IN ADULT: ICD-10-CM

## 2024-02-22 DIAGNOSIS — R94.39 ABNORMAL STRESS TEST: ICD-10-CM

## 2024-02-22 DIAGNOSIS — R94.31 ABNORMAL EKG: ICD-10-CM

## 2024-02-22 DIAGNOSIS — R93.1 ABNORMAL ECHOCARDIOGRAM: ICD-10-CM

## 2024-02-22 DIAGNOSIS — R06.02 SOB (SHORTNESS OF BREATH) ON EXERTION: ICD-10-CM

## 2024-02-22 LAB
ALBUMIN SERPL BCP-MCNC: 4.1 G/DL (ref 3.5–5.2)
ALP SERPL-CCNC: 48 U/L (ref 55–135)
ALT SERPL W/O P-5'-P-CCNC: 18 U/L (ref 10–44)
ANION GAP SERPL CALC-SCNC: 10 MMOL/L (ref 8–16)
AST SERPL-CCNC: 19 U/L (ref 10–40)
BASOPHILS # BLD AUTO: 0.02 K/UL (ref 0–0.2)
BASOPHILS NFR BLD: 0.5 % (ref 0–1.9)
BILIRUB SERPL-MCNC: 1.4 MG/DL (ref 0.1–1)
BNP SERPL-MCNC: <10 PG/ML (ref 0–99)
BUN SERPL-MCNC: 18 MG/DL (ref 6–20)
CALCIUM SERPL-MCNC: 9.4 MG/DL (ref 8.7–10.5)
CHLORIDE SERPL-SCNC: 106 MMOL/L (ref 95–110)
CHOLEST SERPL-MCNC: 144 MG/DL (ref 120–199)
CHOLEST/HDLC SERPL: 3.8 {RATIO} (ref 2–5)
CO2 SERPL-SCNC: 24 MMOL/L (ref 23–29)
CREAT SERPL-MCNC: 1 MG/DL (ref 0.5–1.4)
DIFFERENTIAL METHOD BLD: NORMAL
EOSINOPHIL # BLD AUTO: 0.1 K/UL (ref 0–0.5)
EOSINOPHIL NFR BLD: 2 % (ref 0–8)
ERYTHROCYTE [DISTWIDTH] IN BLOOD BY AUTOMATED COUNT: 12.6 % (ref 11.5–14.5)
EST. GFR  (NO RACE VARIABLE): >60 ML/MIN/1.73 M^2
GLUCOSE SERPL-MCNC: 97 MG/DL (ref 70–110)
HCT VFR BLD AUTO: 43.1 % (ref 40–54)
HDLC SERPL-MCNC: 38 MG/DL (ref 40–75)
HDLC SERPL: 26.4 % (ref 20–50)
HGB BLD-MCNC: 14.1 G/DL (ref 14–18)
IMM GRANULOCYTES # BLD AUTO: 0.01 K/UL (ref 0–0.04)
IMM GRANULOCYTES NFR BLD AUTO: 0.3 % (ref 0–0.5)
LDLC SERPL CALC-MCNC: 89.8 MG/DL (ref 63–159)
LYMPHOCYTES # BLD AUTO: 1.5 K/UL (ref 1–4.8)
LYMPHOCYTES NFR BLD: 37 % (ref 18–48)
MCH RBC QN AUTO: 29.6 PG (ref 27–31)
MCHC RBC AUTO-ENTMCNC: 32.7 G/DL (ref 32–36)
MCV RBC AUTO: 90 FL (ref 82–98)
MONOCYTES # BLD AUTO: 0.3 K/UL (ref 0.3–1)
MONOCYTES NFR BLD: 8.4 % (ref 4–15)
NEUTROPHILS # BLD AUTO: 2 K/UL (ref 1.8–7.7)
NEUTROPHILS NFR BLD: 51.8 % (ref 38–73)
NONHDLC SERPL-MCNC: 106 MG/DL
NRBC BLD-RTO: 0 /100 WBC
PLATELET # BLD AUTO: 187 K/UL (ref 150–450)
PMV BLD AUTO: 9.4 FL (ref 9.2–12.9)
POTASSIUM SERPL-SCNC: 4.3 MMOL/L (ref 3.5–5.1)
PROT SERPL-MCNC: 7.6 G/DL (ref 6–8.4)
RBC # BLD AUTO: 4.77 M/UL (ref 4.6–6.2)
SODIUM SERPL-SCNC: 140 MMOL/L (ref 136–145)
TRIGL SERPL-MCNC: 81 MG/DL (ref 30–150)
TSH SERPL DL<=0.005 MIU/L-ACNC: 1.36 UIU/ML (ref 0.4–4)
WBC # BLD AUTO: 3.92 K/UL (ref 3.9–12.7)

## 2024-02-22 PROCEDURE — 85025 COMPLETE CBC W/AUTO DIFF WBC: CPT | Performed by: INTERNAL MEDICINE

## 2024-02-22 PROCEDURE — 84443 ASSAY THYROID STIM HORMONE: CPT | Performed by: INTERNAL MEDICINE

## 2024-02-22 PROCEDURE — 36415 COLL VENOUS BLD VENIPUNCTURE: CPT | Performed by: INTERNAL MEDICINE

## 2024-02-22 PROCEDURE — 82652 VIT D 1 25-DIHYDROXY: CPT | Performed by: INTERNAL MEDICINE

## 2024-02-22 PROCEDURE — 80061 LIPID PANEL: CPT | Performed by: INTERNAL MEDICINE

## 2024-02-22 PROCEDURE — 83880 ASSAY OF NATRIURETIC PEPTIDE: CPT | Performed by: INTERNAL MEDICINE

## 2024-02-22 PROCEDURE — 80053 COMPREHEN METABOLIC PANEL: CPT | Performed by: INTERNAL MEDICINE

## 2024-02-23 ENCOUNTER — TELEPHONE (OUTPATIENT)
Dept: CARDIOLOGY | Facility: CLINIC | Age: 54
End: 2024-02-23
Payer: COMMERCIAL

## 2024-02-23 NOTE — TELEPHONE ENCOUNTER
.LVM for patient to call the office regarding results. ----- Message from Mairlyn Seaman MD sent at 2/22/2024  6:28 PM CST -----  Labs ok .

## 2024-02-23 NOTE — TELEPHONE ENCOUNTER
Patient was notified of results. All questions were answered. Pt verbalized understanding. Pt will call back with any other questions or concerns.    .LVM for patient to call the office regarding results. ----- Message from Marilyn Seaman MD sent at 2/22/2024  6:28 PM CST -----  Labs ok .        ----- Message from Elzbieta Anderson sent at 2/23/2024 11:09 AM CST -----  Contact: Brandon Forde was returning Lilliam's phone call. Please call him back at [phone].    Thanks  TS

## 2024-02-26 LAB — 1,25(OH)2D3 SERPL-MCNC: 61 PG/ML (ref 20–79)

## 2024-02-27 ENCOUNTER — TELEPHONE (OUTPATIENT)
Dept: CARDIOLOGY | Facility: CLINIC | Age: 54
End: 2024-02-27
Payer: COMMERCIAL

## 2024-02-27 NOTE — TELEPHONE ENCOUNTER
.LVM for patient to call the office regarding results. ----- Message from Marilyn Seaamn MD sent at 2/27/2024  7:50 AM CST -----  Normal vit d

## 2024-03-12 ENCOUNTER — HOSPITAL ENCOUNTER (OUTPATIENT)
Dept: RADIOLOGY | Facility: HOSPITAL | Age: 54
Discharge: HOME | End: 2024-03-12
Attending: NURSE PRACTITIONER
Payer: COMMERCIAL

## 2024-03-12 DIAGNOSIS — R06.02 SOB (SHORTNESS OF BREATH) ON EXERTION: ICD-10-CM

## 2024-03-12 DIAGNOSIS — R91.8 GROUND GLASS OPACITY PRESENT ON IMAGING OF LUNG: ICD-10-CM

## 2024-03-12 PROCEDURE — 71250 CT THORAX DX C-: CPT | Mod: 26,,, | Performed by: RADIOLOGY

## 2024-03-12 PROCEDURE — 71250 CT THORAX DX C-: CPT | Mod: TC

## 2024-03-12 NOTE — PROGRESS NOTES
Subjective:      Patient ID: Stephon Lynne is a 53 y.o. male.    Chief Complaint: Shortness of Breath and Sleep Apnea      HPI  3/13/2024 Gahn NP  Stephon Lynne here for follow up on shortness of breath review CT chest.    Shortness of breath exertion improved somewhat with albuterol inhaler, not resolved.     Shortness of breath remains after normal walking long distance. 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    2/20/2024 CPFT moderate obstruction on spirometry with air trapping on lung volume.  normal diffusing capacity.     3/12/2024 CT chest without contrast stable, chronic findings. Lungs: Pleural/parenchymal scarring is again noted in the bilateral lung apices. 5 mm subpleural pulmonary nodule in the anterior right middle lobe remains stable. More diffuse pleural base nodularity throughout the left upper lobe is also stable. Previously described sequestration in the posteromedial right lower lobe is again noted with some vague associated ground-glass opacity which remains unchanged. No parenchymal consolidations or pleural effusions demonstrated.   Bones: Mild pectus excavatum deformity is again noted with associated narrowing of the AP dimension of the mediastinum.   Impression:  1. Stable examination with unchanged appearance of previously described right lower lobe pulmonary sequestration.  2. Stable pleural base nodularity and bilateral apical scarring.  3. Other stable observations as above    Obstructive sleep apnea on CPAP   He is on Auto CPAP of 6-12 cmH2O pressure which is optimally controlling sleep apnea with apneic index (AHI) 4.8 events an hour.   He is compliant with CPAP use. Complaince download today reveals 80.0% of days with greater than 4 hours of device use. Improved use over past month from 50%. He was laxed with CPAP use falling asleep  before putting on.   Patient reports benefit from CPAP use and denies snoring and excessive daytime sleepiness.  Patient reports no complaints. Nasal pillows mask is used.     Compliance Summary  Min Pressure 6 cmH2O  Max Pressure 12 cmH2O  2/12/2024 - 3/12/2024 (30 days)  Days with Device Usage 25 days  Days without Device Usage 5 days  Percent Days with Device Usage 83.3%  Cumulative Usage 6 days 5 hrs. 7 mins. 18 secs.  Maximum Usage (1 Day) 9 hrs. 24 secs.  Average Usage (All Days) 4 hrs. 58 mins. 14 secs.  Average Usage (Days Used) 5 hrs. 57 mins. 53 secs.  Minimum Usage (1 Day) 1 hrs. 52 mins. 58 secs.  Percent of Days with Usage >= 4 Hours 80.0%  Percent of Days with Usage < 4 Hours 20.0%  Date Range  Total Blower Time 6 days 5 hrs. 29 mins. 24 secs.  Average AHI 4.8  Auto-CPAP Summary  Auto-CPAP Mean Pressure 8.3 cmH2O  Auto-CPAP Peak Average Pressure 10.0 cmH2O  Device Pressure <= 90% of Time 11.2 cmH2O  Average Time in Large Leak Per Day 20 mins. 34 secs.    2/20/2024 Alberto NP   HPI: Stephonmera Lynne is here for follow up for YURI with CPAP complaince assessment and recurrent shortness of breath.  Last seen 4/1/2021 by me.  Patient had prior shortness of breath in 2019 with cardiac and pulmonary evaluation with Dr. Lipscomb.   Ground glass opacity seen in CT scan imaging that improved on 8/2019 CT chest.   Determine patient had obstructive sleep apnea and once began treatment  chest pain and palpitations resolved.   He complains shortness of breath persisting over the past 2 years, chronic off and on with heavy exertion. He did see improvement in shortness of breath when he was using CPAP on a regular basis in 2021.     Shortness of breath exertion over past 2 weeks with light task of walking with trash can to put out garbage at home prompting today's visit.  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  He mainly works [employer]  campus, but does travel to clinics as needed.    2/20/204 We were able to arrange cpft/abg/6mwd/feno/ekg today, while doing 6mwd testing mid way developed level 4 left side chest pain, poking sensation over left pectoralis region.  Completed walk after sitting 2 minutes chest pain subsided to 2/10, then chest pain resolved.     2/20/2024 CPFT Spirometry shows moderate obstruction. Lung volume determination shows TLC is normal with evidence air trapping is present. Spirometry remains unimproved following bronchodilator. Airway mechanics are abnormal showing increased airway resistance and decreased conductance. DLCO is normal. MVV is severely decreased.        2/20/2024 FeNO 17, no inflammation of lungs suggested.    2/20/2024 ABG on room air was normal    2/20/2024 6MWD No desaturations requiring supplemental oxygen at rest or exertion. Exercise capacity is normal.    Dyspnea Characteristics:   Exertional Dyspnea   Dyspnea Duration:  Chronic  Dyspnea Severity:  CHUCHO 3  Dyspnea Timing:  exertional  Dyspnea Contributing Factors:  Deconditioning, bradycardia with 1st degree heart block, nonspecific intraventricular conduction delay has replaced Incomplete right     Dyspnea Associated Symptoms:  Left chest pain with fast paced walking      Modified Chucho Dyspnea Scale      0  Nothing at all    0.5  Very, very slight (just noticeable)    1  Very slight    2  Slight    3  Moderate    4 Somewhat severe    5 Severe      6    7  Very severe    8    9   Very, very severe (almost maximal)  10  Maximal     He is on Auto CPAP of 6-12 cmH2O pressure which is optimally controlling sleep apnea with apneic index (AHI) 5.8 events an hour.   Complaince download today reveals 50% of days with greater than 4 hours of device use.   Patient reports benefit from CPAP use and denies snoring or excessive daytime sleepiness.    Patient reports no complaints. Nasal pillows mask is tolerated.    Other CPAP history  Original ron diagnosis 2011  with [facility]. Used CPAP 2011 -2016. Stopped using since did not like pressure of 14 cm and failed to follow up to change.  Follow up in 2019 since developed chest pain with palpitations.   9/17/2019 Home Sleep Study moderate, non-positional obstructive sleep apnea (YURI). Overall AHI was 18.0 events per hour.   10/2/2019 orders per Dr. 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     Has albuterol inhaler on hand, since had shortness of breath on exertion, since resuming CPAP not long SOB, has not used albuterol in 3 months.     Compliance Summary  Min Pressure 6 cmH2O  Max Pressure 12 cmH2O  11/22/2023 - 2/19/2024 (90 days)  Days with Device Usage 58 days  Days without Device Usage 32 days  Percent Days with Device Usage 64.4%  Cumulative Usage 11 days 10 hrs. 32 mins. 51 secs.  Maximum Usage (1 Day) 9 hrs. 14 mins. 33 secs.  Average Usage (All Days) 3 hrs. 3 mins. 1 secs.  Average Usage (Days Used) 4 hrs. 44 mins.  Minimum Usage (1 Day) 33 mins. 20 secs.  Percent of Days with Usage >= 4 Hours 50.0%  Percent of Days with Usage < 4 Hours 50.0%  Date Range  Total Blower Time 11 days 10 hrs. 37 mins. 7 secs.  Average AHI 5.8  Auto-CPAP Summary  Auto-CPAP Mean Pressure 7.9 cmH2O  Auto-CPAP Peak Average Pressure 10.8 cmH2O  Device Pressure <= 90% of Time 10.7 cmH2O  Average Time in Large Leak Per Day 18 mins. 39 secs.    [place] Sleepiness Scale       2/20/2024     7:53 AM 4/1/2021    11:00 AM 1/31/2020     2:00 PM 9/11/2019     2:00 PM   EPWORTH SLEEPINESS SCALE   Sitting and reading 1 1 0 3   Watching TV 1 1 0 2   Sitting, inactive in a public place (e.g. a theatre or a meeting) 0 0 0 0   As a passenger in a car for an hour without a break 0 1 0 2   Lying down to rest in the afternoon when circumstances permit 0 1 1 2   Sitting and talking to someone 0 0 0 0    Sitting quietly after a lunch without alcohol 0 0 1 0   In a car, while stopped for a few minutes in traffic 0 0 0 0   Total score 2 4 2 9       Previous Report Reviewed: lab reports and office notes     Past Medical History: The following portions of the patient's history were reviewed and updated as appropriate:   He  has a past surgical history that includes Eye surgery; hydrocele; Hernia repair; and Left heart catheterization (Left, 9/26/2019).  His family history includes Aortic aneurysm in his mother; Heart attack in his brother; Hyperlipidemia in his father; Pulmonary embolism in his sister and sister.  He  reports that he quit smoking about 22 years ago. His smoking use included cigarettes. He started smoking about 34 years ago. He has a 18.0 pack-year smoking history. He has never used smokeless tobacco. He reports that he does not currently use alcohol. He reports that he does not use drugs.  He has a current medication list which includes the following prescription(s): albuterol, brimonidine 0.2%, mupirocin, aspirin, cyanocobalamin (vitamin b-12), ondansetron, mohit's wort, and anoro ellipta.  He has No Known Allergies..    The following portions of the patient's history were reviewed and updated as appropriate: allergies, current medications, past family history, past medical history, past social history, past surgical history and problem list.    Review of Systems   Constitutional:  Negative for fever, chills, weight loss, weight gain, activity change, appetite change, fatigue and night sweats.   HENT:  Negative for postnasal drip, rhinorrhea, sinus pressure, voice change and congestion.    Eyes:  Negative for redness and itching.   Respiratory:  Negative for snoring, cough, sputum production, chest tightness, shortness of breath, wheezing, orthopnea, asthma nighttime symptoms, dyspnea on extertion, use of rescue inhaler and somnolence.    Cardiovascular: Negative.  Negative for chest pain,  "palpitations and leg swelling.   Genitourinary:  Negative for difficulty urinating and hematuria.   Endocrine:  Negative for cold intolerance and heat intolerance.    Musculoskeletal:  Negative for arthralgias, gait problem, joint swelling and myalgias.   Skin: Negative.    Gastrointestinal:  Negative for nausea, vomiting, abdominal pain and acid reflux.   Neurological:  Negative for dizziness, weakness, light-headedness and headaches.   Hematological:  Negative for adenopathy. No excessive bruising.   All other systems reviewed and are negative.     Objective:   /60   Pulse 87   Resp 16   Ht 6' 2" (1.88 m)   Wt 108.6 kg (239 lb 6.7 oz)   SpO2 96%   BMI 30.74 kg/m²   Physical Exam  Vitals and nursing note reviewed.   Constitutional:       General: He is not in acute distress.     Appearance: He is well-developed. He is not ill-appearing or toxic-appearing.   HENT:      Head: Normocephalic and atraumatic.      Right Ear: External ear normal.      Left Ear: External ear normal.      Nose: Nose normal.      Mouth/Throat:      Pharynx: No oropharyngeal exudate.   Eyes:      Conjunctiva/sclera: Conjunctivae normal.   Cardiovascular:      Rate and Rhythm: Normal rate and regular rhythm.      Heart sounds: Normal heart sounds.   Pulmonary:      Effort: Pulmonary effort is normal.      Breath sounds: Normal breath sounds.   Abdominal:      Palpations: Abdomen is soft.   Musculoskeletal:      Cervical back: Normal range of motion and neck supple.   Skin:     General: Skin is warm and dry.   Neurological:      Mental Status: He is alert and oriented to person, place, and time.   Psychiatric:         Behavior: Behavior normal. Behavior is cooperative.         Thought Content: Thought content normal.         Judgment: Judgment normal.       Personal Diagnostic Review  CPAP download    2/20/2023 CPFT Spirometry shows moderate obstruction. Lung volume determination shows TLC is normal with evidence air trapping is " present. Spirometry remains unimproved following bronchodilator. Airway mechanics are abnormal showing increased airway resistance and decreased conductance. DLCO is normal. MVV is severely decreased.   Â   Notes:   Â   The failure to demonstrate improvement in spirometry does not preclude a clinical response to a trial of bronchodilators. MVV is reduced out of proportion to FEV1 suggesting poor effort or difficulty performing the maneuver or neuromuscular disease.     Clinical Guide to Interpretation or FeNO Levels :     FeNO  (ppb) LOW INTERMEDIATE HIGH   ADULT VALUES < 25 25-50          > 50   Th2-driven Inflammation Unlikely Likely Significant      Patients FeNO level at this visit : __17__ (ppb)   FENO is less than 25 ppb implies non eosinophilic airway inflammation or the absence of airway inflammation.               Comment: Low FENO (<25 ppb) in adult asthmatics with persistent symptoms suggests other etiologies for these symptoms and a lower likelihood of benefit from adding or increasing inhaled glucocorticoids.     2/20/2024 ABG on room air was normal   Latest Reference Range & Units 02/20/24 08:43   POC PH 7.35 - 7.45  7.397   POC PCO2 35 - 45 mmHg 39.5   POC PO2 80 - 100 mmHg 102 (H)   POC HCO3 24 - 28 mmol/L 24.4   POC SATURATED O2 95 - 100 % 98   Sample  ARTERIAL   POC BE -2 to 2 mmol/L 0   FiO2  21   DelSys  Room Air   Site  RR   Mode  SPONT   (H): Data is abnormally high    2/20/2024 6MWD No desaturations requiring supplemental oxygen at rest or exertion. Exercise capacity is normal.  Phase Oxygen Assessment Supplemental O2 Heart   Rate Blood Pressure Chucho Dyspnea Scale Rating   Resting 98 % Room Air 52 bpm 120/82 0   Exercise             Minute             1 98 % Room Air 87 bpm       2 97 % Room Air 91 bpm       3 98 % Room Air 92 bpm       4 98 % Room Air 92 bpm       5 98 % Room Air 92 bpm       6  98 % Room Air 95 bpm 144/81 5-6   Recovery             Minute             1 99 % Room Air 69 bpm        2 99 % Room Air 65 bpm       3 100 % Room Air 60 bpm       4 100 % Room Air 62 bpm 134/81 2      Six Minute Walk Summary  6MWT Status: completed without stopping  Patient Reported: Dyspnea (chest discomfort)               Interpretation:  Did the patient stop or pause?: No  Total Time Walked (Calculated): 360 seconds  Final Partial Lap Distance (feet): 0 feet  Total Distance Meters (Calculated): 548.64 meters  Predicted Distance Meters (Calculated): 683.47 meters  Percentage of Predicted (Calculated): 80.27  Peak VO2 (Calculated): 20.44  Mets: 5.84  Has The Patient Had a Previous Six Minute Walk Test?: No     Previous 6MWT Results  Has The Patient Had a Previous Six Minute Walk Test?: No     2/20/2024 EKG   Sinus bradycardia with 1st degree A-V block   Left axis deviation   Nonspecific intraventricular conduction delay   Abnormal ECG   When compared with ECG of 18-SEP-2019 14:49,   Nonspecific intraventricular conduction delay has replaced Incomplete right   bundle branch block   Confirmed by MAGDALENA PADILLA MD ([id number]) on 2/20/2024 10:58:58 AM       Home Sleep Study  9/17/2019  PHYSICIAN INTERPRETATION AND COMMENTS: Findings are consistent with moderate, non-positional obstructive  sleep apnea (YURI). Overall AHI was 18.0 events per hour. Minimum oxygen saturation during study was 81.5%. Loud snoring  recorded. Treatment is indicated. Intervention with CPAP. Auto PAP 5-20 cm water pressure with suitable mask. Close  follow-up to ensure resolution of symptoms.    CT Chest Without Contrast  Narrative: EXAMINATION:  CT CHEST WITHOUT CONTRAST    CLINICAL HISTORY:  Chest pain, nonspecific;Dyspnea, chronic, chest wall or pleura disease suspected;ground glass opacity. sob exertion; Shortness of breath    TECHNIQUE:  Low dose axial images, sagittal and coronal reformations were obtained from the thoracic inlet to the lung bases. Contrast was not administered.    COMPARISON:  09/11/2019    FINDINGS:  Heart and Great  vessels: Within normal limits    Thoracic Adenopathy: None demonstrated    Lungs: Pleural/parenchymal scarring is again noted in the bilateral lung apices.  5 mm subpleural pulmonary nodule in the anterior right middle lobe remains stable.  More diffuse pleural base nodularity throughout the left upper lobe is also stable.  Previously described sequestration in the posteromedial right lower lobe is again noted with some vague associated ground-glass opacity which remains unchanged.  No parenchymal consolidations or pleural effusions demonstrated.    Upper Abdomen: Small bilateral renal cysts do not appear significantly changed.  No acute findings.    Bones: Mild pectus excavatum deformity is again noted with associated narrowing of the AP dimension of the mediastinum.    Miscellaneous: None  Impression: 1. Stable examination with unchanged appearance of previously described right lower lobe pulmonary sequestration.  2. Stable pleural base nodularity and bilateral apical scarring.  3. Other stable observations as above.    Electronically signed by: Mat Naidu MD  Date:    03/12/2024  Time:    11:23        9/11/2019 CT Chest   EXAMINATION:  CT CHEST WITHOUT CONTRAST     CLINICAL HISTORY:  Chest wall pain;Dyspnea chronic, prior xray;Right upper lobe infiltrate vs mass; Other chest pain     TECHNIQUE:  Low dose axial images, sagittal and coronal reformations were obtained from the thoracic inlet to the lung bases. Contrast was not administered.     COMPARISON:  07/04/2019     FINDINGS:  Heart and Great vessels: Within normal limits     Thoracic Adenopathy: None demonstrated     Lungs: There is pleural/parenchymal scarring in the bilateral apices, greater on the right.  Hazy reticulonodular infiltrates seen in the right upper lobe on prior appears improved and nearly resolved.  There is an area of ill-defined ground-glass opacity in the medial right lower lobe which appears to have aberrant arterial vasculature  rising from the aorta, in keeping with a pulmonary sequestration.  No definite associated separate pleural lining, suggesting interlobular type.  There is a 5 mm noncalcified subpleural pulmonary nodule present in the anterior right middle lobe which is unchanged.  No new pulmonary findings.     Upper Abdomen: No acute findings     Bones: There is mild pectus excavatum deformity with associated narrowed AP diameter of the mediastinum.     Miscellaneous: None     Impression:     1. Hazy reticulonodular infiltrates in the right upper lobe on prior study have nearly resolved.  There is some prominent residual pleural and parenchymal scarring in the bilateral lung apices.  2. Findings in keeping with pulmonary sequestration involving the right lower lobe, favored to be interlobular.  3. Unchanged 5 mm subpleural pulmonary nodule in the right upper lobe.  For a solid nodule <6 mm, Fleischner Society 2017 guidelines recommend no routine follow up for a low risk patient, or follow-up with non-contrast chest CT at 12 months in a high risk patient.        Electronically signed by: Mat Naidu MD  Date:                                            09/11/2019  Time:                                           14:50  Assessment:     1. SOB (shortness of breath) on exertion    2. YURI on CPAP    3. Simple chronic bronchitis    4. Pulmonary air trapping    5. Class 1 obesity without serious comorbidity with body mass index (BMI) of 30.0 to 30.9 in adult, unspecified obesity type    6. Congenital pectus excavatum    7. Pulmonary sequestration            Orders Placed This Encounter   Procedures    Spirometry with/without bronchodilator     Standing Status:   Future     Standing Expiration Date:   3/13/2025     Order Specific Question:   Release to patient     Answer:   Immediate     Plan:     Problem List Items Addressed This Visit       YURI on CPAP (Chronic)     Benefits Auto CPAP 6-12 cm with AHI 4.8  Nasal pillows mask  HME:  [facility]         SOB (shortness of breath) on exertion - Primary     normal oxygenation abg,6mwd, cxr clear. exam normal.   3/12/2024 CT chest stable chronic findings  History of Simple chronic bronchitis, no cough, no wheezing, symptomatic shortness of breath.   Some improvement with Albuterol inhaler.  Begin Anoro 1 puff daily. Continue Albuterol inhaler   Follow up 2 months evaluate treatment, feroz.   Keep planned testing and follow up with cardiology         Relevant Orders    Spirometry with/without bronchodilator    Simple chronic bronchitis     Frequent bronchitis flares childhood.  No cough, no wheezing. Symptomatic shortness of breath   Albuterol helps some for shortness of breath.   Moderate obstruction on spirometry, pulmonary air trapping on lung volumes.  3/13/2024 begin Anoro 1 puff daily. Continue Albuterol inhaler if needed.          Relevant Medications    umeclidinium-vilanteroL (ANORO ELLIPTA) 62.5-25 mcg/actuation DsDv    Other Relevant Orders    Spirometry with/without bronchodilator    Pulmonary sequestration     Stable congential pulmonary sequestration.  3/13/2024 Reviewed case with Dr. Lipscomb, no treatment indicated.   3/12/2024 CT chest Stable examination with unchanged appearance of previously described right lower lobe pulmonary sequestration.   9/11/2019 There is an area of ill-defined ground-glass opacity in the medial right lower lobe which appears to have aberrant arterial vasculature rising from the aorta, in keeping with a pulmonary sequestration. No definite associated separate pleural lining, suggesting interlobular type.            Pulmonary air trapping     Seen on cpft symptomatic shortness of breath.   3/13/2024 begin Anoro. Continue Albuterol inhaler if needed  2/20/2023 CPFT Spirometry shows moderate obstruction. Lung volume determination shows TLC is normal with evidence air trapping is present. Spirometry remains unimproved following bronchodilator. Airway mechanics are  abnormal showing increased airway resistance and decreased conductance. DLCO is normal. MVV is severely decreased.              Relevant Medications    umeclidinium-vilanteroL (ANORO ELLIPTA) 62.5-25 mcg/actuation DsDv    Other Relevant Orders    Spirometry with/without bronchodilator    Congenital pectus excavatum     Mild, stable          Class 1 obesity without serious comorbidity with body mass index (BMI) of 30.0 to 30.9 in adult     Encouraged calorie reduction and 30 minutes of exercise daily. Discussed impact of obesity on general health.  Wt Readings from Last 8 Encounters:   03/13/24 108.6 kg (239 lb 6.7 oz)   02/21/24 107.9 kg (237 lb 14 oz)   02/20/24 110 kg (242 lb 8.1 oz)   02/20/24 110 kg (242 lb 8.1 oz)   04/01/21 109 kg (240 lb 4.8 oz)   01/31/20 109.5 kg (241 lb 6.5 oz)   10/16/19 104.5 kg (230 lb 6.1 oz)   10/02/19 104.6 kg (230 lb 9.6 oz)   Body mass index is 30.74 kg/m².                I spent a total of 55 minutes on the day of the visit.  This includes face to face time and non-face to face time preparing to see the patient (eg, review of tests), obtaining and/or reviewing separately obtained history, documenting clinical information in the electronic or other health record, independently interpreting results and communicating results to the patient/family/caregiver, or care coordinator.     (DME) - [facility]  Reviewed therapeutic goals for positive airway pressure therapy Auto CPAP  Ideal is usage 100% of nights for 6 - 8 hours per night. Minimum usage is 70% of night for at least 4 hours per night used.     Follow up in about 8 weeks (around 5/8/2024) for COPD/SOB spirometry. ron on cpap .

## 2024-03-13 ENCOUNTER — OFFICE VISIT (OUTPATIENT)
Dept: PULMONOLOGY | Facility: CLINIC | Age: 54
End: 2024-03-13
Payer: COMMERCIAL

## 2024-03-13 VITALS
HEIGHT: 74 IN | OXYGEN SATURATION: 96 % | BODY MASS INDEX: 30.73 KG/M2 | HEART RATE: 87 BPM | SYSTOLIC BLOOD PRESSURE: 120 MMHG | RESPIRATION RATE: 16 BRPM | WEIGHT: 239.44 LBS | DIASTOLIC BLOOD PRESSURE: 60 MMHG

## 2024-03-13 DIAGNOSIS — E66.9 CLASS 1 OBESITY WITHOUT SERIOUS COMORBIDITY WITH BODY MASS INDEX (BMI) OF 30.0 TO 30.9 IN ADULT, UNSPECIFIED OBESITY TYPE: ICD-10-CM

## 2024-03-13 DIAGNOSIS — J41.0 SIMPLE CHRONIC BRONCHITIS: ICD-10-CM

## 2024-03-13 DIAGNOSIS — R06.02 SOB (SHORTNESS OF BREATH) ON EXERTION: Primary | ICD-10-CM

## 2024-03-13 DIAGNOSIS — Q67.6 CONGENITAL PECTUS EXCAVATUM: ICD-10-CM

## 2024-03-13 DIAGNOSIS — R09.89 PULMONARY AIR TRAPPING: ICD-10-CM

## 2024-03-13 DIAGNOSIS — Q33.2 PULMONARY SEQUESTRATION: ICD-10-CM

## 2024-03-13 DIAGNOSIS — G47.33 OSA ON CPAP: Chronic | ICD-10-CM

## 2024-03-13 PROCEDURE — 3074F SYST BP LT 130 MM HG: CPT | Mod: CPTII,S$GLB,, | Performed by: NURSE PRACTITIONER

## 2024-03-13 PROCEDURE — 3008F BODY MASS INDEX DOCD: CPT | Mod: CPTII,S$GLB,, | Performed by: NURSE PRACTITIONER

## 2024-03-13 PROCEDURE — 3078F DIAST BP <80 MM HG: CPT | Mod: CPTII,S$GLB,, | Performed by: NURSE PRACTITIONER

## 2024-03-13 PROCEDURE — 99215 OFFICE O/P EST HI 40 MIN: CPT | Mod: S$GLB,,, | Performed by: NURSE PRACTITIONER

## 2024-03-13 PROCEDURE — 99999 PR PBB SHADOW E&M-EST. PATIENT-LVL III: CPT | Mod: PBBFAC,,, | Performed by: NURSE PRACTITIONER

## 2024-03-13 PROCEDURE — 1160F RVW MEDS BY RX/DR IN RCRD: CPT | Mod: CPTII,S$GLB,, | Performed by: NURSE PRACTITIONER

## 2024-03-13 PROCEDURE — 1159F MED LIST DOCD IN RCRD: CPT | Mod: CPTII,S$GLB,, | Performed by: NURSE PRACTITIONER

## 2024-03-13 RX ORDER — UMECLIDINIUM BROMIDE AND VILANTEROL TRIFENATATE 62.5; 25 UG/1; UG/1
1 POWDER RESPIRATORY (INHALATION) DAILY
Qty: 60 EACH | Refills: 5 | Status: SHIPPED | OUTPATIENT
Start: 2024-03-13 | End: 2024-03-13

## 2024-03-13 RX ORDER — UMECLIDINIUM BROMIDE AND VILANTEROL TRIFENATATE 62.5; 25 UG/1; UG/1
1 POWDER RESPIRATORY (INHALATION) DAILY
Qty: 180 EACH | Refills: 3 | Status: SHIPPED | OUTPATIENT
Start: 2024-03-13

## 2024-03-13 NOTE — ASSESSMENT & PLAN NOTE
Frequent bronchitis flares childhood.  No cough, no wheezing. Symptomatic shortness of breath   Albuterol helps some for shortness of breath.   Moderate obstruction on spirometry, pulmonary air trapping on lung volumes.  3/13/2024 begin Anoro 1 puff daily. Continue Albuterol inhaler if needed.

## 2024-03-13 NOTE — ASSESSMENT & PLAN NOTE
Encouraged calorie reduction and 30 minutes of exercise daily. Discussed impact of obesity on general health.  Wt Readings from Last 8 Encounters:   03/13/24 108.6 kg (239 lb 6.7 oz)   02/21/24 107.9 kg (237 lb 14 oz)   02/20/24 110 kg (242 lb 8.1 oz)   02/20/24 110 kg (242 lb 8.1 oz)   04/01/21 109 kg (240 lb 4.8 oz)   01/31/20 109.5 kg (241 lb 6.5 oz)   10/16/19 104.5 kg (230 lb 6.1 oz)   10/02/19 104.6 kg (230 lb 9.6 oz)   Body mass index is 30.74 kg/m².

## 2024-03-13 NOTE — ASSESSMENT & PLAN NOTE
Stable congential pulmonary sequestration.  3/13/2024 Reviewed case with Dr. Lipscomb, no treatment indicated.   3/12/2024 CT chest Stable examination with unchanged appearance of previously described right lower lobe pulmonary sequestration.   9/11/2019 There is an area of ill-defined ground-glass opacity in the medial right lower lobe which appears to have aberrant arterial vasculature rising from the aorta, in keeping with a pulmonary sequestration. No definite associated separate pleural lining, suggesting interlobular type.

## 2024-03-13 NOTE — ASSESSMENT & PLAN NOTE
Seen on cpft symptomatic shortness of breath.   3/13/2024 begin Anoro. Continue Albuterol inhaler if needed  2/20/2023 CPFT Spirometry shows moderate obstruction. Lung volume determination shows TLC is normal with evidence air trapping is present. Spirometry remains unimproved following bronchodilator. Airway mechanics are abnormal showing increased airway resistance and decreased conductance. DLCO is normal. MVV is severely decreased.

## 2024-03-13 NOTE — ASSESSMENT & PLAN NOTE
normal oxygenation abg,6mwd, cxr clear. exam normal.   3/12/2024 CT chest stable chronic findings  History of Simple chronic bronchitis, no cough, no wheezing, symptomatic shortness of breath.   Some improvement with Albuterol inhaler.  Begin Anoro 1 puff daily. Continue Albuterol inhaler   Follow up 2 months evaluate treatment, feroz.   Keep planned testing and follow up with cardiology

## 2024-03-19 ENCOUNTER — HOSPITAL ENCOUNTER (OUTPATIENT)
Dept: RADIOLOGY | Facility: HOSPITAL | Age: 54
Discharge: HOME | End: 2024-03-19
Attending: INTERNAL MEDICINE
Payer: COMMERCIAL

## 2024-03-19 ENCOUNTER — HOSPITAL ENCOUNTER (OUTPATIENT)
Dept: PULMONOLOGY | Facility: HOSPITAL | Age: 54
Discharge: HOME | End: 2024-03-19
Attending: INTERNAL MEDICINE
Payer: COMMERCIAL

## 2024-03-19 ENCOUNTER — HOSPITAL ENCOUNTER (OUTPATIENT)
Dept: CARDIOLOGY | Facility: HOSPITAL | Age: 54
Discharge: HOME | End: 2024-03-19
Attending: INTERNAL MEDICINE
Payer: COMMERCIAL

## 2024-03-19 VITALS
BODY MASS INDEX: 30.67 KG/M2 | HEART RATE: 58 BPM | HEART RATE: 71 BPM | SYSTOLIC BLOOD PRESSURE: 108 MMHG | WEIGHT: 239 LBS | DIASTOLIC BLOOD PRESSURE: 76 MMHG | SYSTOLIC BLOOD PRESSURE: 108 MMHG | WEIGHT: 239 LBS | DIASTOLIC BLOOD PRESSURE: 76 MMHG | HEIGHT: 74 IN | HEIGHT: 74 IN | BODY MASS INDEX: 30.67 KG/M2

## 2024-03-19 DIAGNOSIS — R94.31 ABNORMAL EKG: ICD-10-CM

## 2024-03-19 DIAGNOSIS — E66.1 CLASS 1 DRUG-INDUCED OBESITY WITHOUT SERIOUS COMORBIDITY WITH BODY MASS INDEX (BMI) OF 30.0 TO 30.9 IN ADULT: ICD-10-CM

## 2024-03-19 DIAGNOSIS — R06.02 SOB (SHORTNESS OF BREATH) ON EXERTION: ICD-10-CM

## 2024-03-19 DIAGNOSIS — R93.1 ABNORMAL ECHOCARDIOGRAM: ICD-10-CM

## 2024-03-19 DIAGNOSIS — Z82.49 FAMILY HISTORY OF PREMATURE CAD: ICD-10-CM

## 2024-03-19 DIAGNOSIS — R94.39 ABNORMAL STRESS TEST: ICD-10-CM

## 2024-03-19 LAB
AORTIC ROOT ANNULUS: 3.34 CM
ASCENDING AORTA: 3.8 CM
AV INDEX (PROSTH): 0.85
AV MEAN GRADIENT: 3 MMHG
AV PEAK GRADIENT: 5 MMHG
AV VALVE AREA BY VELOCITY RATIO: 2.6 CM²
AV VALVE AREA: 2.58 CM²
AV VELOCITY RATIO: 0.85
BSA FOR ECHO PROCEDURE: 2.38 M2
CV ECHO LV RWT: 0.52 CM
CV STRESS BASE HR: 55 BPM
DIASTOLIC BLOOD PRESSURE: 76 MMHG
DOP CALC AO PEAK VEL: 1.17 M/S
DOP CALC AO VTI: 24.8 CM
DOP CALC LVOT AREA: 3 CM2
DOP CALC LVOT DIAMETER: 1.97 CM
DOP CALC LVOT PEAK VEL: 1 M/S
DOP CALC LVOT STROKE VOLUME: 63.98 CM3
DOP CALC MV VTI: 24.6 CM
DOP CALC RVOT PEAK VEL: 0.68 M/S
DOP CALC RVOT VTI: 11.8 CM
DOP CALCLVOT PEAK VEL VTI: 21 CM
E WAVE DECELERATION TIME: 326.43 MSEC
E/A RATIO: 1.18
E/E' RATIO: 4.55 M/S
ECHO LV POSTERIOR WALL: 1.23 CM (ref 0.6–1.1)
EJECTION FRACTION- HIGH: 73 %
END DIASTOLIC INDEX-HIGH: 165 ML/M2
END DIASTOLIC INDEX-LOW: 101 ML/M2
END SYSTOLIC INDEX-HIGH: 64 ML/M2
END SYSTOLIC INDEX-LOW: 28 ML/M2
FRACTIONAL SHORTENING: 34 % (ref 28–44)
INTERVENTRICULAR SEPTUM: 1.41 CM (ref 0.6–1.1)
IVC DIAMETER: 1.64 CM
IVRT: 83.73 MSEC
LA MAJOR: 4.99 CM
LA MINOR: 5.62 CM
LA WIDTH: 3.6 CM
LEFT ATRIUM SIZE: 2.83 CM
LEFT ATRIUM VOLUME INDEX: 19.6 ML/M2
LEFT ATRIUM VOLUME: 45.78 CM3
LEFT INTERNAL DIMENSION IN SYSTOLE: 3.14 CM (ref 2.1–4)
LEFT VENTRICLE DIASTOLIC VOLUME INDEX: 44.82 ML/M2
LEFT VENTRICLE DIASTOLIC VOLUME: 104.88 ML
LEFT VENTRICLE MASS INDEX: 106 G/M2
LEFT VENTRICLE SYSTOLIC VOLUME INDEX: 16.8 ML/M2
LEFT VENTRICLE SYSTOLIC VOLUME: 39.21 ML
LEFT VENTRICULAR INTERNAL DIMENSION IN DIASTOLE: 4.75 CM (ref 3.5–6)
LEFT VENTRICULAR MASS: 247.21 G
LV LATERAL E/E' RATIO: 5.08 M/S
LV SEPTAL E/E' RATIO: 4.13 M/S
LVOT MG: 2.55 MMHG
LVOT MV: 0.78 CM/S
MV MEAN GRADIENT: 1 MMHG
MV PEAK A VEL: 0.56 M/S
MV PEAK E VEL: 0.66 M/S
MV PEAK GRADIENT: 3 MMHG
MV STENOSIS PRESSURE HALF TIME: 94.67 MS
MV VALVE AREA BY CONTINUITY EQUATION: 2.6 CM2
MV VALVE AREA P 1/2 METHOD: 2.32 CM2
NUC REST EJECTION FRACTION: 61
NUC STRESS EJECTION FRACTION: 71 %
OHS CV CPX 85 PERCENT MAX PREDICTED HEART RATE MALE: 144
OHS CV CPX ESTIMATED METS: 12
OHS CV CPX MAX PREDICTED HEART RATE: 169
OHS CV CPX PATIENT IS FEMALE: 0
OHS CV CPX PATIENT IS MALE: 1
OHS CV CPX PEAK DIASTOLIC BLOOD PRESSURE: 71 MMHG
OHS CV CPX PEAK HEAR RATE: 160 BPM
OHS CV CPX PEAK RATE PRESSURE PRODUCT: NORMAL
OHS CV CPX PEAK SYSTOLIC BLOOD PRESSURE: 168 MMHG
OHS CV CPX PERCENT MAX PREDICTED HEART RATE ACHIEVED: 95
OHS CV CPX RATE PRESSURE PRODUCT PRESENTING: 5940
PISA TR MAX VEL: 2.46 M/S
PV MEAN GRADIENT: 1 MMHG
RA MAJOR: 4.02 CM
RA PRESSURE ESTIMATED: 3 MMHG
RA WIDTH: 3 CM
RETIRED EF AND QEF - SEE NOTES: 59 %
RV TB RVSP: 5 MMHG
STJ: 3.81 CM
STRESS ECHO POST EXERCISE DUR MIN: 9 MINUTES
STRESS ECHO POST EXERCISE DUR SEC: 34 SECONDS
SYSTOLIC BLOOD PRESSURE: 108 MMHG
TDI LATERAL: 0.13 M/S
TDI SEPTAL: 0.16 M/S
TDI: 0.15 M/S
TR MAX PG: 24 MMHG
TR MEAN GRADIENT: 19 MMHG
TRICUSPID ANNULAR PLANE SYSTOLIC EXCURSION: 2.46 CM
TV REST PULMONARY ARTERY PRESSURE: 27 MMHG
Z-SCORE OF LEFT VENTRICULAR DIMENSION IN END DIASTOLE: -7.07
Z-SCORE OF LEFT VENTRICULAR DIMENSION IN END SYSTOLE: -4.84

## 2024-03-19 PROCEDURE — 93016 CV STRESS TEST SUPVJ ONLY: CPT | Mod: ,,, | Performed by: INTERNAL MEDICINE

## 2024-03-19 PROCEDURE — 93306 TTE W/DOPPLER COMPLETE: CPT | Mod: 26,,, | Performed by: INTERNAL MEDICINE

## 2024-03-19 PROCEDURE — 78452 HT MUSCLE IMAGE SPECT MULT: CPT

## 2024-03-19 PROCEDURE — A9502 TC99M TETROFOSMIN: HCPCS | Performed by: INTERNAL MEDICINE

## 2024-03-19 PROCEDURE — 78452 HT MUSCLE IMAGE SPECT MULT: CPT | Mod: 26,,, | Performed by: INTERNAL MEDICINE

## 2024-03-19 PROCEDURE — 93018 CV STRESS TEST I&R ONLY: CPT | Mod: ,,, | Performed by: INTERNAL MEDICINE

## 2024-03-19 PROCEDURE — 93306 TTE W/DOPPLER COMPLETE: CPT

## 2024-03-19 RX ADMIN — TETROFOSMIN 30.6 MILLICURIE: 1.38 INJECTION, POWDER, LYOPHILIZED, FOR SOLUTION INTRAVENOUS at 09:03

## 2024-03-19 RX ADMIN — TETROFOSMIN 9.7 MILLICURIE: 1.38 INJECTION, POWDER, LYOPHILIZED, FOR SOLUTION INTRAVENOUS at 08:03

## 2024-03-20 ENCOUNTER — TELEPHONE (OUTPATIENT)
Dept: CARDIOLOGY | Facility: CLINIC | Age: 54
End: 2024-03-20
Payer: COMMERCIAL

## 2024-03-20 NOTE — TELEPHONE ENCOUNTER
----- Message from Marilyn Seaman MD sent at 3/20/2024  9:34 AM CDT -----  Heart function unchanged.  Stress test looks good

## 2024-04-03 ENCOUNTER — PATIENT MESSAGE (OUTPATIENT)
Dept: PULMONOLOGY | Facility: CLINIC | Age: 54
End: 2024-04-03
Payer: COMMERCIAL

## 2024-09-09 ENCOUNTER — OFFICE VISIT (OUTPATIENT)
Facility: CLINIC | Age: 54
End: 2024-09-09
Payer: COMMERCIAL

## 2024-09-09 ENCOUNTER — LAB VISIT (OUTPATIENT)
Dept: LAB | Facility: HOSPITAL | Age: 54
End: 2024-09-09
Attending: FAMILY MEDICINE
Payer: COMMERCIAL

## 2024-09-09 ENCOUNTER — OFFICE VISIT (OUTPATIENT)
Dept: FAMILY MEDICINE | Facility: CLINIC | Age: 54
End: 2024-09-09
Payer: COMMERCIAL

## 2024-09-09 VITALS
WEIGHT: 252.75 LBS | BODY MASS INDEX: 32.45 KG/M2 | DIASTOLIC BLOOD PRESSURE: 72 MMHG | TEMPERATURE: 97 F | SYSTOLIC BLOOD PRESSURE: 104 MMHG | HEART RATE: 73 BPM | OXYGEN SATURATION: 96 %

## 2024-09-09 VITALS
DIASTOLIC BLOOD PRESSURE: 81 MMHG | RESPIRATION RATE: 16 BRPM | WEIGHT: 252.88 LBS | BODY MASS INDEX: 32.47 KG/M2 | SYSTOLIC BLOOD PRESSURE: 125 MMHG | HEART RATE: 70 BPM

## 2024-09-09 DIAGNOSIS — E22.0 ACROMEGALY: ICD-10-CM

## 2024-09-09 DIAGNOSIS — G47.33 OSA ON CPAP: Chronic | ICD-10-CM

## 2024-09-09 DIAGNOSIS — E29.1 MALE HYPOGONADISM: ICD-10-CM

## 2024-09-09 DIAGNOSIS — Z12.11 SCREENING FOR COLON CANCER: ICD-10-CM

## 2024-09-09 DIAGNOSIS — Z12.5 PROSTATE CANCER SCREENING: ICD-10-CM

## 2024-09-09 DIAGNOSIS — E29.1 MALE HYPOGONADISM: Primary | ICD-10-CM

## 2024-09-09 DIAGNOSIS — R53.83 FATIGUE, UNSPECIFIED TYPE: ICD-10-CM

## 2024-09-09 DIAGNOSIS — E34.9 TESTOSTERONE DEFICIENCY: ICD-10-CM

## 2024-09-09 DIAGNOSIS — R53.83 FATIGUE, UNSPECIFIED TYPE: Primary | ICD-10-CM

## 2024-09-09 LAB
ALBUMIN SERPL BCP-MCNC: 3.9 G/DL (ref 3.5–5.2)
ALP SERPL-CCNC: 54 U/L (ref 55–135)
ALT SERPL W/O P-5'-P-CCNC: 16 U/L (ref 10–44)
ANION GAP SERPL CALC-SCNC: 6 MMOL/L (ref 8–16)
AST SERPL-CCNC: 18 U/L (ref 10–40)
BASOPHILS # BLD AUTO: 0.01 K/UL (ref 0–0.2)
BASOPHILS NFR BLD: 0.2 % (ref 0–1.9)
BILIRUB SERPL-MCNC: 0.8 MG/DL (ref 0.1–1)
BUN SERPL-MCNC: 14 MG/DL (ref 6–20)
CALCIUM SERPL-MCNC: 9.5 MG/DL (ref 8.7–10.5)
CHLORIDE SERPL-SCNC: 106 MMOL/L (ref 95–110)
CO2 SERPL-SCNC: 27 MMOL/L (ref 23–29)
CREAT SERPL-MCNC: 0.8 MG/DL (ref 0.5–1.4)
DIFFERENTIAL METHOD BLD: ABNORMAL
EOSINOPHIL # BLD AUTO: 0.1 K/UL (ref 0–0.5)
EOSINOPHIL NFR BLD: 1.4 % (ref 0–8)
ERYTHROCYTE [DISTWIDTH] IN BLOOD BY AUTOMATED COUNT: 12.6 % (ref 11.5–14.5)
EST. GFR  (NO RACE VARIABLE): >60 ML/MIN/1.73 M^2
ESTIMATED AVG GLUCOSE: 103 MG/DL (ref 68–131)
GLUCOSE SERPL-MCNC: 93 MG/DL (ref 70–110)
HBA1C MFR BLD: 5.2 % (ref 4–5.6)
HCT VFR BLD AUTO: 41.2 % (ref 40–54)
HGB BLD-MCNC: 13.4 G/DL (ref 14–18)
IMM GRANULOCYTES # BLD AUTO: 0.01 K/UL (ref 0–0.04)
IMM GRANULOCYTES NFR BLD AUTO: 0.2 % (ref 0–0.5)
LYMPHOCYTES # BLD AUTO: 1.6 K/UL (ref 1–4.8)
LYMPHOCYTES NFR BLD: 28.9 % (ref 18–48)
MCH RBC QN AUTO: 29.2 PG (ref 27–31)
MCHC RBC AUTO-ENTMCNC: 32.5 G/DL (ref 32–36)
MCV RBC AUTO: 90 FL (ref 82–98)
MONOCYTES # BLD AUTO: 0.6 K/UL (ref 0.3–1)
MONOCYTES NFR BLD: 11.1 % (ref 4–15)
NEUTROPHILS # BLD AUTO: 3.3 K/UL (ref 1.8–7.7)
NEUTROPHILS NFR BLD: 58.2 % (ref 38–73)
NRBC BLD-RTO: 0 /100 WBC
PLATELET # BLD AUTO: 176 K/UL (ref 150–450)
PMV BLD AUTO: 9.8 FL (ref 9.2–12.9)
POTASSIUM SERPL-SCNC: 4.1 MMOL/L (ref 3.5–5.1)
PROT SERPL-MCNC: 7.3 G/DL (ref 6–8.4)
RBC # BLD AUTO: 4.59 M/UL (ref 4.6–6.2)
SODIUM SERPL-SCNC: 139 MMOL/L (ref 136–145)
WBC # BLD AUTO: 5.58 K/UL (ref 3.9–12.7)

## 2024-09-09 PROCEDURE — 99999 PR PBB SHADOW E&M-EST. PATIENT-LVL IV: CPT | Mod: PBBFAC,,, | Performed by: UROLOGY

## 2024-09-09 PROCEDURE — 3008F BODY MASS INDEX DOCD: CPT | Mod: CPTII,S$GLB,, | Performed by: FAMILY MEDICINE

## 2024-09-09 PROCEDURE — 1159F MED LIST DOCD IN RCRD: CPT | Mod: CPTII,S$GLB,, | Performed by: FAMILY MEDICINE

## 2024-09-09 PROCEDURE — 3074F SYST BP LT 130 MM HG: CPT | Mod: CPTII,S$GLB,, | Performed by: FAMILY MEDICINE

## 2024-09-09 PROCEDURE — 85025 COMPLETE CBC W/AUTO DIFF WBC: CPT | Performed by: FAMILY MEDICINE

## 2024-09-09 PROCEDURE — 99204 OFFICE O/P NEW MOD 45 MIN: CPT | Mod: S$GLB,,, | Performed by: UROLOGY

## 2024-09-09 PROCEDURE — 3074F SYST BP LT 130 MM HG: CPT | Mod: CPTII,S$GLB,, | Performed by: UROLOGY

## 2024-09-09 PROCEDURE — 1159F MED LIST DOCD IN RCRD: CPT | Mod: CPTII,S$GLB,, | Performed by: UROLOGY

## 2024-09-09 PROCEDURE — 3079F DIAST BP 80-89 MM HG: CPT | Mod: CPTII,S$GLB,, | Performed by: UROLOGY

## 2024-09-09 PROCEDURE — 99204 OFFICE O/P NEW MOD 45 MIN: CPT | Mod: S$GLB,,, | Performed by: FAMILY MEDICINE

## 2024-09-09 PROCEDURE — 83036 HEMOGLOBIN GLYCOSYLATED A1C: CPT | Performed by: FAMILY MEDICINE

## 2024-09-09 PROCEDURE — 86803 HEPATITIS C AB TEST: CPT | Performed by: FAMILY MEDICINE

## 2024-09-09 PROCEDURE — 84403 ASSAY OF TOTAL TESTOSTERONE: CPT | Performed by: FAMILY MEDICINE

## 2024-09-09 PROCEDURE — 99999 PR PBB SHADOW E&M-EST. PATIENT-LVL IV: CPT | Mod: PBBFAC,,, | Performed by: FAMILY MEDICINE

## 2024-09-09 PROCEDURE — 36415 COLL VENOUS BLD VENIPUNCTURE: CPT | Mod: PO | Performed by: FAMILY MEDICINE

## 2024-09-09 PROCEDURE — G2211 COMPLEX E/M VISIT ADD ON: HCPCS | Mod: S$GLB,,, | Performed by: FAMILY MEDICINE

## 2024-09-09 PROCEDURE — 80053 COMPREHEN METABOLIC PANEL: CPT | Performed by: FAMILY MEDICINE

## 2024-09-09 PROCEDURE — 3078F DIAST BP <80 MM HG: CPT | Mod: CPTII,S$GLB,, | Performed by: FAMILY MEDICINE

## 2024-09-09 PROCEDURE — 3008F BODY MASS INDEX DOCD: CPT | Mod: CPTII,S$GLB,, | Performed by: UROLOGY

## 2024-09-09 RX ORDER — TESTOSTERONE 20.25 MG/1.25G
2 GEL TOPICAL SEE ADMIN INSTRUCTIONS
Qty: 75 G | Refills: 5 | Status: SHIPPED | OUTPATIENT
Start: 2024-09-09

## 2024-09-09 RX ORDER — TESTOSTERONE 20.25 MG/1.25G
2 GEL TOPICAL SEE ADMIN INSTRUCTIONS
Qty: 75 G | Refills: 5 | Status: SHIPPED | OUTPATIENT
Start: 2024-09-09 | End: 2024-09-09 | Stop reason: SDUPTHER

## 2024-09-09 NOTE — PROGRESS NOTES
Subjective:       Patient ID: Stephon Lynne is a 53 y.o. male.    Chief Complaint: Low Testosterone      History of Present Illness:     Mr Lynne was referred to me for testosterone deficiency.  He was on androgel from 2016 to 2018.  He has been off TRT since 2018.  He has never been on testosterone injections.  He says that he has problems with fatigue that is worsening recently.  Normal libido.  He says he had problems with ED and decreased penile sensitive this past weekend which has never happened before.  He says that he is not a diabetic.  He says that he has a history of a right hydrocelectomy and a right inguinal hernia repair in the same setting when he was 15 year old per the patient.         Past Medical History:   Diagnosis Date    Abnormal echocardiogram 2019    Abnormal EKG 2024    Acromegaly     Congenital pectus excavatum     Family history of premature CAD 2019    Hypogonadism in male     Near syncope 2019    Other chest pain 2019    Sleep apnea      Family History   Problem Relation Name Age of Onset    Aortic aneurysm Mother      Hyperlipidemia Father      Pulmonary embolism Sister phillip     Pulmonary embolism Sister josiah     Heart attack Brother Papito      Social History     Socioeconomic History    Marital status:    Tobacco Use    Smoking status: Former     Current packs/day: 0.00     Average packs/day: 1.5 packs/day for 12.0 years (18.0 ttl pk-yrs)     Types: Cigarettes     Start date: 1990     Quit date: 2002     Years since quittin.7     Passive exposure: Never    Smokeless tobacco: Never   Substance and Sexual Activity    Alcohol use: Not Currently     Alcohol/week: 0.0 standard drinks of alcohol    Drug use: No     Social Determinants of Health     Financial Resource Strain: Low Risk  (2024)    Overall Financial Resource Strain (CARDIA)     Difficulty of Paying Living Expenses: Not very hard   Food Insecurity: No Food  Insecurity (2/21/2024)    Hunger Vital Sign     Worried About Running Out of Food in the Last Year: Never true     Ran Out of Food in the Last Year: Never true   Transportation Needs: No Transportation Needs (2/21/2024)    PRAPARE - Transportation     Lack of Transportation (Medical): No     Lack of Transportation (Non-Medical): No   Physical Activity: Unknown (2/21/2024)    Exercise Vital Sign     Days of Exercise per Week: 0 days   Stress: No Stress Concern Present (2/21/2024)    [employer] - Occupational Stress Questionnaire     Feeling of Stress : Only a little   Housing Stability: Low Risk  (2/21/2024)    Housing Stability Vital Sign     Unable to Pay for Housing in the Last Year: No     Number of Places Lived in the Last Year: 1     Unstable Housing in the Last Year: No     Outpatient Encounter Medications as of 9/9/2024   Medication Sig Dispense Refill    albuterol (PROVENTIL/VENTOLIN HFA) 90 mcg/actuation inhaler Inhale 2 puffs into the lungs every 4 (four) hours as needed for Shortness of Breath. Rescue 18 g 11    brimonidine 0.2% (ALPHAGAN) 0.2 % Drop Place one drop in both eyes twice daily 5 mL 3    ondansetron (ZOFRAN-ODT) 4 MG TbDL Take 1 tablet (4 mg total) by mouth every 6 (six) hours as needed. 20 tablet 0    mupirocin (BACTROBAN) 2 % ointment Apply topically 2 (two) times daily. (Patient not taking: Reported on 9/9/2024) 30 g 0    testosterone (ANDROGEL) 20.25 mg/1.25 gram (1.62 %) GlPm Place 2 Pump onto the skin As instructed (Apply a total of 2 pumps every morning to your shoulders). 75 g 5    [DISCONTINUED] aspirin (ECOTRIN) 81 MG EC tablet Take 81 mg by mouth once daily.      [DISCONTINUED] cyanocobalamin, vitamin B-12, (VITAMIN B-12) 1,000 mcg TbSR Take 1 tablet by mouth once daily.      [DISCONTINUED] DON'S WORT ORAL Take 1 tablet by mouth once daily. Advised to hold      [DISCONTINUED] umeclidinium-vilanteroL (ANORO ELLIPTA) 62.5-25 mcg/actuation DsDv Inhale  1 puff into the lungs once daily. Controller 180 each 3     No facility-administered encounter medications on file as of 9/9/2024.        Review of Systems   Constitutional:  Negative for chills and fever.   Respiratory:  Negative for shortness of breath.    Cardiovascular:  Negative for chest pain.   Gastrointestinal:  Negative for nausea and vomiting.   Genitourinary:  Negative for difficulty urinating.   Musculoskeletal:  Negative for back pain.   Skin:  Negative for rash.   Neurological:  Negative for dizziness.   Psychiatric/Behavioral:  Negative for agitation.        Objective:     /81 (BP Location: Right arm, Patient Position: Sitting)   Pulse 70   Resp 16   Wt 114.7 kg (252 lb 13.9 oz)   BMI 32.47 kg/m²     Physical Exam  Constitutional:       Appearance: Normal appearance.   Pulmonary:      Effort: Pulmonary effort is normal.   Abdominal:      Palpations: Abdomen is soft.   Neurological:      Mental Status: He is alert and oriented to person, place, and time.   Psychiatric:         Mood and Affect: Mood normal.         No visits with results within 1 Day(s) from this visit.   Latest known visit with results is:   Hospital Outpatient Visit on 03/19/2024   Component Date Value Ref Range Status    85% Max Predicted HR 03/19/2024 144   Final    Max Predicted HR 03/19/2024 169   Final    [facility] CV CPX PATIENT IS MALE 03/19/2024 1.0   Final    [facility] CV CPX PATIENT IS FEMALE 03/19/2024 0.0   Final    HR at rest 03/19/2024 55  bpm Final    Systolic blood pressure 03/19/2024 108  mmHg Final    Diastolic blood pressure 03/19/2024 76  mmHg Final    RPP 03/19/2024 5,940   Final    Peak HR 03/19/2024 160  bpm Final    Peak Systolic BP 03/19/2024 168  mmHg Final    Peak Diatolic BP 03/19/2024 71  mmHg Final    Peak RPP 03/19/2024 26,880   Final    % Max HR Achieved 03/19/2024 95   Final    EF + QEF 03/19/2024 59  % Final    Ejection Fraction- High Stress 03/19/2024 73  % Final    End diastolic index (mL/m2)  03/19/2024 101  mL/m2 Final    End diastolic index (mL/m2) 03/19/2024 165  mL/m2 Final    End systolic index (mL/m2) 03/19/2024 28  mL/m2 Final    End systolic index (mL/m2) 03/19/2024 64  mL/m2 Final    Exercise duration (min) 03/19/2024 9  minutes Final    Exercise duration (sec) 03/19/2024 34  seconds Final    Estimated METs 03/19/2024 12   Final    Nuc Stress EF 03/19/2024 71  % Final    Nuc Rest EF 03/19/2024 61   Final        No results found for this or any previous visit (from the past 8760 hour(s)).     Assessment:       1. Male hypogonadism    2. Testosterone deficiency    3. Fatigue, unspecified type    4. Prostate cancer screening      Plan:     Orders Placed This Encounter    PSA, SCREENING    testosterone (ANDROGEL) 20.25 mg/1.25 gram (1.62 %) GlPm          5-9-17  Total testosterone 393    10-6-16  Total testosterone 142    11-13-15  Total testosterone 191    I reviewed the above lab results.         Assessment:  - Hypogonadism.  He was on androgel from 2016 to 2018.  He has been off TRT since 2018.  He has never been on testosterone injections.  - Fatigue that is worsening recently.  - History of a right hydrocelectomy and a right inguinal hernia repair in the same setting when he was 15 year old per the patient.    Plan:  - I discussed options with the patient today and he would like to restart on androgel.    - I prescribed him androgel 2 pumps qam today on 9-9-24.  I recommend he not start on the androgel until his testosterone result that was checked today on 9-9-24 by his PCP returns to confirm that his testosterone level is low.  - PSA today.  - RTC in 4 months.

## 2024-09-09 NOTE — PROGRESS NOTES
"Subjective:       Patient ID: Stephon Lynne is a 53 y.o. male.    Chief Complaint: Fatigue      HPI Comments:       Current Outpatient Medications:     albuterol (PROVENTIL/VENTOLIN HFA) 90 mcg/actuation inhaler, Inhale 2 puffs into the lungs every 4 (four) hours as needed for Shortness of Breath. Rescue, Disp: 18 g, Rfl: 11    brimonidine 0.2% (ALPHAGAN) 0.2 % Drop, Place one drop in both eyes twice daily, Disp: 5 mL, Rfl: 3    ondansetron (ZOFRAN-ODT) 4 MG TbDL, Take 1 tablet (4 mg total) by mouth every 6 (six) hours as needed., Disp: 20 tablet, Rfl: 0    mupirocin (BACTROBAN) 2 % ointment, Apply topically 2 (two) times daily. (Patient not taking: Reported on 9/9/2024), Disp: 30 g, Rfl: 0      First visit with me in 5 years.  History of acromegaly, testosterone deficiency, ED, YURI, pulmonary sequestration    Chief complaint today:  Fatigue for the last 2 weeks.  Difficulty getting project started.    Says it feels different from previous problems he has had with his CPAP/YURI, even though he does currently describe non restorative sleep.  He says it is also different from past problems with depression and alcohol abuse.  He says his mood is good currently    Has been alcohol free for 2 or 3 years.    Over the last several days he is also began having ED again which he had not experience in any recent time..  He describes it as "lack of sensation".    Saw pulmonology earlier this year for shortness a breath.  CPAP machine was reset and he has been doing better.  Also was put on Anoro for "air trapping".  Says he takes it only when he is going to be doing something physical 1st    Has not been back to see a endocrinologist for many years.  In 2017 saw Dr. story here who ordered an MRI for recheck of a past pituitary problem.  This did not get done.  Patient willing the reestablish with endocrinology now.      Also will set him up with Urology for     Had colonoscopies before but is due for his next 1, which was " ordered today what is likely to be low testosterone      Review of Systems   Constitutional:  Positive for fatigue. Negative for activity change, appetite change and fever.   HENT:  Negative for sore throat.    Respiratory:  Negative for cough and shortness of breath.    Cardiovascular:  Negative for chest pain.   Gastrointestinal:  Negative for abdominal pain, diarrhea and nausea.   Genitourinary:  Negative for difficulty urinating.   Musculoskeletal:  Negative for arthralgias and myalgias.   Neurological:  Negative for dizziness and headaches.   Psychiatric/Behavioral:  Negative for dysphoric mood. The patient is not nervous/anxious.        Objective:      Vitals:    09/09/24 0743   BP: 104/72   Pulse: 73   Temp: 97.2 °F (36.2 °C)   TempSrc: Tympanic   SpO2: 96%   Weight: 114.7 kg (252 lb 12.1 oz)   PainSc: 0-No pain     Physical Exam  Vitals and nursing note reviewed.   Constitutional:       General: He is not in acute distress.     Appearance: He is well-developed. He is not diaphoretic.   HENT:      Head: Normocephalic.   Neck:      Thyroid: No thyromegaly.   Cardiovascular:      Rate and Rhythm: Normal rate and regular rhythm.      Heart sounds: Normal heart sounds. No murmur heard.  Pulmonary:      Effort: Pulmonary effort is normal.      Breath sounds: Normal breath sounds. No wheezing or rales.   Abdominal:      General: There is no distension.      Palpations: Abdomen is soft.   Musculoskeletal:      Cervical back: Neck supple.      Right lower leg: No edema.      Left lower leg: No edema.   Lymphadenopathy:      Cervical: No cervical adenopathy.   Skin:     General: Skin is warm and dry.   Neurological:      Mental Status: He is alert and oriented to person, place, and time.   Psychiatric:         Mood and Affect: Mood normal.         Behavior: Behavior normal.         Thought Content: Thought content normal.         Judgment: Judgment normal.         Assessment:       1. Fatigue, unspecified type    2.  Acromegaly    3. YURI on CPAP    4. Testosterone deficiency    5. Screening for colon cancer        Plan:   Fatigue, unspecified type  Comments:  Testosterone, A1c and other labs  Orders:  -     Hemoglobin A1C; Future; Expected date: 09/09/2024  -     Comprehensive Metabolic Panel; Future; Expected date: 09/09/2024  -     CBC Auto Differential; Future; Expected date: 09/09/2024  -     Hepatitis C Antibody; Future; Expected date: 09/09/2024    Acromegaly  Comments:  Endocrinology consult    YURI on CPAP  Comments:  Says his current settings are good.    Testosterone deficiency  Comments:  Urology consult  Orders:  -     Testosterone; Future; Expected date: 09/09/2024  -     Ambulatory referral/consult to Urology; Future; Expected date: 09/16/2024    Screening for colon cancer  Comments:  Colonoscopy ordered  Orders:  -     Ambulatory referral/consult to Endo Procedure ; Future; Expected date: 09/10/2024

## 2024-09-10 ENCOUNTER — HOSPITAL ENCOUNTER (OUTPATIENT)
Dept: PREADMISSION TESTING | Facility: HOSPITAL | Age: 54
Discharge: HOME | End: 2024-09-10
Attending: FAMILY MEDICINE
Payer: COMMERCIAL

## 2024-09-10 DIAGNOSIS — E29.1 MALE HYPOGONADISM: Primary | ICD-10-CM

## 2024-09-10 DIAGNOSIS — Z12.11 SCREENING FOR COLON CANCER: ICD-10-CM

## 2024-09-10 LAB
HCV AB SERPL QL IA: NORMAL
TESTOST SERPL-MCNC: 302 NG/DL (ref 304–1227)

## 2024-11-04 ENCOUNTER — PATIENT MESSAGE (OUTPATIENT)
Dept: FAMILY MEDICINE | Facility: CLINIC | Age: 54
End: 2024-11-04
Payer: COMMERCIAL

## 2024-11-13 ENCOUNTER — OFFICE VISIT (OUTPATIENT)
Dept: FAMILY MEDICINE | Facility: CLINIC | Age: 54
End: 2024-11-13
Payer: COMMERCIAL

## 2024-11-13 DIAGNOSIS — E22.0 ACROMEGALY: ICD-10-CM

## 2024-11-13 DIAGNOSIS — E34.9 TESTOSTERONE DEFICIENCY: ICD-10-CM

## 2024-11-13 DIAGNOSIS — F19.20 SUBSTANCE DEPENDENCE: Primary | ICD-10-CM

## 2024-11-13 DIAGNOSIS — R56.9 SEIZURE IN CHILDHOOD: ICD-10-CM

## 2024-11-13 DIAGNOSIS — F32.A DEPRESSION, UNSPECIFIED DEPRESSION TYPE: ICD-10-CM

## 2024-11-13 PROCEDURE — G2211 COMPLEX E/M VISIT ADD ON: HCPCS | Mod: 95,,, | Performed by: FAMILY MEDICINE

## 2024-11-13 PROCEDURE — 3044F HG A1C LEVEL LT 7.0%: CPT | Mod: CPTII,95,, | Performed by: FAMILY MEDICINE

## 2024-11-13 PROCEDURE — 99214 OFFICE O/P EST MOD 30 MIN: CPT | Mod: 95,,, | Performed by: FAMILY MEDICINE

## 2024-11-13 NOTE — PROGRESS NOTES
Subjective:       Patient ID: Stephon Lynne is a 54 y.o. male.    Chief Complaint: No chief complaint on file.      HPI Comments:       Current Outpatient Medications:     albuterol (PROVENTIL/VENTOLIN HFA) 90 mcg/actuation inhaler, Inhale 2 puffs into the lungs every 4 (four) hours as needed for Shortness of Breath. Rescue, Disp: 18 g, Rfl: 11    brimonidine 0.2% (ALPHAGAN) 0.2 % Drop, Place one drop in both eyes twice daily, Disp: 5 mL, Rfl: 3    mupirocin (BACTROBAN) 2 % ointment, Apply topically 2 (two) times daily. (Patient not taking: Reported on 9/9/2024), Disp: 30 g, Rfl: 0    ondansetron (ZOFRAN-ODT) 4 MG TbDL, Take 1 tablet (4 mg total) by mouth every 6 (six) hours as needed., Disp: 20 tablet, Rfl: 0    testosterone (ANDROGEL) 20.25 mg/1.25 gram (1.62 %) GlPm, Place 2 Pump onto the skin As instructed (Apply a total of 2 pumps every morning to your shoulders)., Disp: 75 g, Rfl: 5      The patient location is:  [place]  The chief complaint leading to consultation is:  Medication for cravings    Visit type: audiovisual    Face to Face time with patient:  15 minutes  25 minutes of total time spent on the encounter, which includes face to face time and non-face to face time preparing to see the patient (eg, review of tests), Obtaining and/or reviewing separately obtained history, Documenting clinical information in the electronic or other health record, Independently interpreting results (not separately reported) and communicating results to the patient/family/caregiver, or Care coordination (not separately reported).         Each patient to whom he or she provides medical services by telemedicine is:  (1) informed of the relationship between the physician and patient and the respective role of any other health care provider with respect to management of the patient; and (2) notified that he or she may decline to receive medical services by telemedicine and may withdraw from such care at any  "time.    Notes:        Today's visit was result of a chart communication, where Brandon asked about using Wellbutrin for some problems he has been having over the past few years.  However he has a childhood history of some seizure activity.  Today we set up this virtual visit to discuss his concerns.      In 2002 he quit smoking.  Since then he has had a series of "replacement cravings".  At times of stress.  These usually coincides deaths of fit importance family members including in sequence over the last several years his mother, his father, his sister.    On 1 occasion he has took a drinking and he finally put it down 2 years ago.  He has been sober during that time.  This was replaced for awhile was smokeless tobacco, and sometimes over eating.    Patient admits he may have some depression.      Review of Systems   Constitutional:  Positive for activity change and unexpected weight change.   HENT:  Negative for hearing loss and trouble swallowing.    Eyes:  Negative for discharge.   Respiratory:  Negative for chest tightness and wheezing.    Cardiovascular:  Negative for chest pain and palpitations.   Gastrointestinal:  Negative for constipation, diarrhea and vomiting.   Genitourinary:  Negative for difficulty urinating and hematuria.   Neurological:  Negative for headaches.   Psychiatric/Behavioral:  Positive for dysphoric mood.        Objective:      There were no vitals filed for this visit.  Physical Exam  Constitutional:       Appearance: He is not ill-appearing.   HENT:      Head: Normocephalic.   Pulmonary:      Effort: Pulmonary effort is normal. No respiratory distress.   Musculoskeletal:      Cervical back: Neck supple.   Neurological:      Mental Status: He is alert and oriented to person, place, and time.   Psychiatric:         Mood and Affect: Mood normal.         Behavior: Behavior normal.         Thought Content: Thought content normal.         Judgment: Judgment normal.         Assessment:       1. " Substance dependence    2. Acromegaly    3. Testosterone deficiency    4. Depression, unspecified depression type    5. Seizure in childhood        Plan:   Substance dependence  Comments:  In the past: Alcohol, tobacco, eating.  Psych consult.  Also consult for therapy    Acromegaly  Comments:  Endocrinology consult for clarity of diagnosis and status  Orders:  -     Ambulatory referral/consult to Endocrinology; Future; Expected date: 11/20/2024    Testosterone deficiency  Comments:  Now on AndroGel from urologist  Orders:  -     Ambulatory referral/consult to Endocrinology; Future; Expected date: 11/20/2024    Depression, unspecified depression type  Comments:  Psychiatry consult  Orders:  -     Ambulatory referral/consult to Psychiatry; Future; Expected date: 11/20/2024  -     Ambulatory referral/consult to Psychology; Future; Expected date: 11/20/2024    Seizure in childhood  Comments:  Wellbutrin relatively contraindicated  Orders:  -     Ambulatory referral/consult to Psychiatry; Future; Expected date: 11/20/2024

## 2024-11-14 ENCOUNTER — TELEPHONE (OUTPATIENT)
Dept: PSYCHIATRY | Facility: CLINIC | Age: 54
End: 2024-11-14
Payer: COMMERCIAL

## 2024-12-06 ENCOUNTER — OFFICE VISIT (OUTPATIENT)
Dept: PSYCHIATRY | Facility: CLINIC | Age: 54
End: 2024-12-06
Payer: COMMERCIAL

## 2024-12-06 DIAGNOSIS — R56.9 SEIZURE IN CHILDHOOD: ICD-10-CM

## 2024-12-06 DIAGNOSIS — F32.A DEPRESSION, UNSPECIFIED DEPRESSION TYPE: ICD-10-CM

## 2024-12-06 DIAGNOSIS — F17.201 TOBACCO USE DISORDER, SEVERE, IN EARLY REMISSION: ICD-10-CM

## 2024-12-06 DIAGNOSIS — F10.11 ALCOHOL USE DISORDER, MILD, IN EARLY REMISSION: Primary | ICD-10-CM

## 2024-12-06 DIAGNOSIS — F43.81 PERSISTENT COMPLEX BEREAVEMENT DISORDER: ICD-10-CM

## 2024-12-06 PROCEDURE — 99999 PR PBB SHADOW E&M-EST. PATIENT-LVL II: CPT | Mod: PBBFAC,,, | Performed by: PSYCHOLOGIST

## 2024-12-06 PROCEDURE — 3044F HG A1C LEVEL LT 7.0%: CPT | Mod: CPTII,S$GLB,, | Performed by: PSYCHOLOGIST

## 2024-12-06 PROCEDURE — 99205 OFFICE O/P NEW HI 60 MIN: CPT | Mod: S$GLB,,, | Performed by: PSYCHOLOGIST

## 2024-12-06 PROCEDURE — 1159F MED LIST DOCD IN RCRD: CPT | Mod: CPTII,S$GLB,, | Performed by: PSYCHOLOGIST

## 2024-12-06 RX ORDER — NALTREXONE HYDROCHLORIDE 50 MG/1
50 TABLET, FILM COATED ORAL DAILY
Qty: 30 TABLET | Refills: 2 | Status: SHIPPED | OUTPATIENT
Start: 2024-12-06 | End: 2025-03-06

## 2024-12-06 NOTE — PROGRESS NOTES
PSYCHIATRIC EVALUATION     Disclaimer: Evaluation and treatment is based on information presented to date. Any new information may affect assessment and findings.     Name: Stephon Lynne  Age: 54 y.o.  : 1970    Preferred Name: Brandon    Referring provider: Dr. Fredi Landis    Chief Complaint:  Substance Use, Grief    History of Present Illness:   Pt is referred by his PCP, Dr. Fredi Landis, for management of substance use.     Progress Notes: Fredi Landis MD at 2024  2:20 PM  Status: Signed   Expand All Collapse All  Subjective:      Subjective  Patient ID: Stephon Lynne is a 54 y.o. male.     Chief Complaint: No chief complaint on file.     HPI Comments:   Current Medications      Current Outpatient Medications:     albuterol (PROVENTIL/VENTOLIN HFA) 90 mcg/actuation inhaler, Inhale 2 puffs into the lungs every 4 (four) hours as needed for Shortness of Breath. Rescue, Disp: 18 g, Rfl: 11    brimonidine 0.2% (ALPHAGAN) 0.2 % Drop, Place one drop in both eyes twice daily, Disp: 5 mL, Rfl: 3    mupirocin (BACTROBAN) 2 % ointment, Apply topically 2 (two) times daily. (Patient not taking: Reported on 2024), Disp: 30 g, Rfl: 0    ondansetron (ZOFRAN-ODT) 4 MG TbDL, Take 1 tablet (4 mg total) by mouth every 6 (six) hours as needed., Disp: 20 tablet, Rfl: 0    testosterone (ANDROGEL) 20.25 mg/1.25 gram (1.62 %) GlPm, Place 2 Pump onto the skin As instructed (Apply a total of 2 pumps every morning to your shoulders)., Disp: 75 g, Rfl: 5      The patient location is:  [place]  The chief complaint leading to consultation is:  Medication for cravings  Visit type: audiovisual     Face to Face time with patient:  15 minutes  25 minutes of total time spent on the encounter, which includes face to face time and non-face to face time preparing to see the patient (eg, review of tests), Obtaining and/or reviewing separately obtained history, Documenting clinical information in the electronic or  "other health record, Independently interpreting results (not separately reported) and communicating results to the patient/family/caregiver, or Care coordination (not separately reported).      Each patient to whom he or she provides medical services by telemedicine is:  (1) informed of the relationship between the physician and patient and the respective role of any other health care provider with respect to management of the patient; and (2) notified that he or she may decline to receive medical services by telemedicine and may withdraw from such care at any time.     Notes:    Today's visit was result of a chart communication, where Brandon asked about using Wellbutrin for some problems he has been having over the past few years.  However he has a childhood history of some seizure activity.  Today we set up this virtual visit to discuss his concerns.       In 2002 he quit smoking.  Since then he has had a series of "replacement cravings".  At times of stress.  These usually coincides deaths of fit importance family members including in sequence over the last several years his mother, his father, his sister.     On 1 occasion he has took a drinking and he finally put it down 2 years ago.  He has been sober during that time.  This was replaced for awhile was smokeless tobacco, and sometimes over eating.     Patient admits he may have some depression.     Review of Systems   Constitutional:  Positive for activity change and unexpected weight change.   HENT:  Negative for hearing loss and trouble swallowing.    Eyes:  Negative for discharge.   Respiratory:  Negative for chest tightness and wheezing.    Cardiovascular:  Negative for chest pain and palpitations.   Gastrointestinal:  Negative for constipation, diarrhea and vomiting.   Genitourinary:  Negative for difficulty urinating and hematuria.   Neurological:  Negative for headaches.   Psychiatric/Behavioral:  Positive for dysphoric mood.       Objective: " "  Objective  There were no vitals filed for this visit.  Physical Exam  Constitutional:       Appearance: He is not ill-appearing.   HENT:      Head: Normocephalic.   Pulmonary:      Effort: Pulmonary effort is normal. No respiratory distress.   Musculoskeletal:      Cervical back: Neck supple.   Neurological:      Mental Status: He is alert and oriented to person, place, and time.   Psychiatric:         Mood and Affect: Mood normal.         Behavior: Behavior normal.         Thought Content: Thought content normal.         Judgment: Judgment normal.      Assessment:   Assessment  1. Substance dependence    2. Acromegaly    3. Testosterone deficiency    4. Depression, unspecified depression type    5. Seizure in childhood       Plan:   Substance dependence  Comments:  In the past: Alcohol, tobacco, eating.  Psych consult.  Also consult for therapy     Acromegaly  Comments:  Endocrinology consult for clarity of diagnosis and status  Orders:  -     Ambulatory referral/consult to Endocrinology; Future; Expected date: 11/20/2024     Testosterone deficiency  Comments:  Now on AndroGel from urologist  Orders:  -     Ambulatory referral/consult to Endocrinology; Future; Expected date: 11/20/2024     Depression, unspecified depression type  Comments:  Psychiatry consult  Orders:  -     Ambulatory referral/consult to Psychiatry; Future; Expected date: 11/20/2024  -     Ambulatory referral/consult to Psychology; Future; Expected date: 11/20/2024     Seizure in childhood  Comments:  Wellbutrin relatively contraindicated  Orders:  -     Ambulatory referral/consult to Psychiatry; Future; Expected date: 11/20/2024          Electronically signed by Fredi Landis MD at 11/13/2024  4:21 PM     CURRENT VISIT:  Pt reports concern regarding his "addictive personality" that led to drinking heavily to self-medicate grief after his mother's death. He wants to break this cycle of using substances if there is an underlying issue such " "as depression compelling use. His wife thinks he's depressed, and this is why he would drink, smoke, and now chews tobacco. He reports a pattern of quitting one substance then using another. He reports experiencing ongoing cravings to drink though he's been sober for the past 1.5 years. Substances "took away my drive" to do other things he used to enjoy.    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 He did experience relationship problems with his wife; she grew up in household with addiction problems so recognized his alcohol problem. He was "good at hiding it" from his wife and others but felt depressed hiding it from her since he's always been honest with her about everything.     Pt started smoking cigarettes at age 20, smoked 1-1.5 packs (20-30 cigs per day) until he quit smoking in 2002. He then started using smokeless tobacco/chew and quit 2 years ago on his own but continues to have tobacco cravings as well; unable to say if still uses occasionally. He's never used nicotine replacement therapy (NRT) of any kind. He was interested in starting Wellbutrin for smoking cessation (wife is taking it for depression), but it's contraindicated due to history of seizures.    He reports no history of mental health issues. He denies current mood/depression problems; sleep and appetite are normal. He does endorse current fatigue and poor concentration. He does not report past or present symptoms consistent with a depressive episode. He's never been tested or diagnosed with attention problems, ADHD. He denies significant anxiety/nervousness. He does not report past or present symptoms consistent with a manic/hypomanic episode. He has no past suicide attempts and no past " "psychiatric hospitalizations. He's never experienced psychotic symptoms.    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 He's been  for the past 25 years; his wife is an RN in the [employer] (both employed [employer]). He has a son (age 29) from his wife's previous marriage though pt has been father to him since son was 3 y.o. He has another "son" (age 24; family friend) he never adopted but just like a son to him, and 1 grandchild. Pt reports he's recently learned much about his childhood and family from his older sister--his father who raised him wasn't biological father, and his mother's "lifestyle" prior to his birth; she apparently drank and used substances heavily but "turned her life around" when he was born. He was born with a heart defect and not expected to live past age 5. He reports from what he recalls his childhood was good. He's the youngest of 4 (3 siblings); his parents are both . Pt reports having strong anderson that helps him cope.    Discussed with pt Naltrexone (Depade) oral tab and Vivitrol injection for alcohol, drug cravings; reviewed associated benefits and risks. Pt will consider referral to social work for individual therapy; will revisit at 4-week follow up for medication management.     ADHD: inattentive   Depressive Disorder: tired/fatigued, concentration problems   Anxiety Disorder: fatigue, concentration problems   Panic Disorder: denied   Manic Disorder: denied   Psychotic Disorder: denied   Substance Use:  Alcohol: prior heavy drinking but sober past 1.5 years and Nicotine: cigarette smoking since age 20, quit , 1-1.5 packs per day,  chewing/smokeless tobacco; quit 2 years ago     Review of Systems   Constitutional:  Negative for activity change, appetite change, fatigue and unexpected weight change.   Respiratory:  Negative for shortness of breath.    Psychiatric/Behavioral:  Positive for decreased concentration. Negative " "for agitation, behavioral problems, confusion, dysphoric mood, hallucinations, self-injury, sleep disturbance and suicidal ideas. The patient is not nervous/anxious and is not hyperactive.       Nutritional Screening: Considering the patient's height and weight, medications, medical history and preferences, should a referral be made to the dietitian? no    Constitutional:  Vitals:  Most recent vital signs were reviewed.   Last 3 sets of Vitals        3/19/2024     9:52 AM 9/9/2024     7:43 AM 9/9/2024     1:13 PM   Vitals - 1 value per visit   SYSTOLIC 108 104 125   DIASTOLIC 76 72 81   Pulse 58 73 70   Temp  97.2 °F (36.2 °C)    Resp   16   SPO2  96 %    Weight (lb) 239 252.76 252.87   Weight (kg) 108.41 114.65 114.7   Height 6' 2" (1.88 m)     BMI (Calculated) 30.7     Pain Score  Zero Zero          Psychiatric:  Oriented: x 3 / including: Date: 12/6/24; and aware meeting with [facility] [place]   Attitude: cooperative   Eye Contact: good   Behavior: wnl   Mood: "okay"  Affect: appropriate range   Attention: intact   Concentration: grossly intact   Thought Process: goal directed   Speech: intelligible  Volume: WNL   Quantity: very talkative, detailed descriptions  Rhythm: WNL  Insight: fair to good   Threats: no SI / HI   Memory: Grossly intact  Psychosis: denies all   Estimate of Intellectual Function: average   Judgment (to simple situation): good   Relevant Elements of Neurological Exam: normal gait     Medical history:   Past Medical History:   Diagnosis Date    Abnormal echocardiogram 08/07/2019    Abnormal EKG 02/21/2024    Acromegaly     Congenital pectus excavatum     Family history of premature CAD 08/07/2019    Hypogonadism in male     Near syncope 08/07/2019    Other chest pain 08/07/2019    Sleep apnea       Family History:  Family History   Problem Relation Name Age of Onset    Aortic aneurysm Mother      Hyperlipidemia Father      Pulmonary embolism Sister phillip     Pulmonary embolism Sister " josiah     Heart attack Brother Papito       Family history of psychiatric illness: Maternal side: Substance Use Disorders.    PSYCHO-SOCIAL DEVELOPMENT HISTORY:   Social History     Socioeconomic History    Marital status:    Tobacco Use    Smoking status: Former     Current packs/day: 0.00     Average packs/day: 1.5 packs/day for 12.0 years (18.0 ttl pk-yrs)     Types: Cigarettes     Start date: 1990     Quit date: 2002     Years since quittin.9     Passive exposure: Never    Smokeless tobacco: Never   Substance and Sexual Activity    Alcohol use: Not Currently     Alcohol/week: 0.0 standard drinks of alcohol    Drug use: No     Social Drivers of Health     Financial Resource Strain: Low Risk  (2024)    Overall Financial Resource Strain (CARDIA)     Difficulty of Paying Living Expenses: Not very hard   Food Insecurity: No Food Insecurity (2024)    Hunger Vital Sign     Worried About Running Out of Food in the Last Year: Never true     Ran Out of Food in the Last Year: Never true   Transportation Needs: No Transportation Needs (2024)    PRAPARE - Transportation     Lack of Transportation (Medical): No     Lack of Transportation (Non-Medical): No   Physical Activity: Unknown (2024)    Exercise Vital Sign     Days of Exercise per Week: 0 days   Stress: No Stress Concern Present (2024)    [employer] [place] [employer] - Occupational Stress Questionnaire     Feeling of Stress : Only a little   Housing Stability: Low Risk  (2024)    Housing Stability Vital Sign     Unable to Pay for Housing in the Last Year: No     Number of Places Lived in the Last Year: 1     Unstable Housing in the Last Year: No      Allergy Review:   Review of patient's allergies indicates:  No Known Allergies     Medical Problem List:   Patient Active Problem List   Diagnosis    Acromegaly    Sexual dysfunction    Erectile dysfunction    Prostatism    Testosterone deficiency    YURI on CPAP     Near syncope    Abnormal echocardiogram    Family history of premature CAD    Abnormal stress test    Class 1 obesity without serious comorbidity with body mass index (BMI) of 30.0 to 30.9 in adult    SOB (shortness of breath) on exertion    Abnormal EKG    Congenital pectus excavatum    Pulmonary sequestration    Simple chronic bronchitis    Pulmonary air trapping      Encounter Diagnoses   Name Primary?    Depression, unspecified depression type     Seizure in childhood     Alcohol use disorder, mild, in early remission Yes    Tobacco use disorder, severe, in early remission     Persistent complex bereavement disorder       IMPRESSIONS/PLAN:  Pt meets diagnostic criteria for Alcohol Use Disorder, mild, in early remission, Tobacco Use Disorder, severe, in early remission, Complicated Bereavement, Persistent complicated bereavement, Unspecified depressive disorder.    Medication Management: Continue current medications. Discussed risks, benefits, and alternatives to treatment plan documented above with patient. I answered all patient questions related to this plan, and patient expressed understanding and agreement.      Follow up in about 4 weeks (around 1/3/2025) for Medication follow up.     Medication List with Changes/Refills   New Medications    NALTREXONE (DEPADE) 50 MG TABLET    Take 1 tablet (50 mg total) by mouth once daily.   Current Medications    ALBUTEROL (PROVENTIL/VENTOLIN HFA) 90 MCG/ACTUATION INHALER    Inhale 2 puffs into the lungs every 4 (four) hours as needed for Shortness of Breath. Rescue    BRIMONIDINE 0.2% (ALPHAGAN) 0.2 % DROP    Place one drop in both eyes twice daily    ONDANSETRON (ZOFRAN-ODT) 4 MG TBDL    Take 1 tablet (4 mg total) by mouth every 6 (six) hours as needed.    TESTOSTERONE (ANDROGEL) 20.25 MG/1.25 GRAM (1.62 %) GLPM    Place 2 Pump onto the skin As instructed (Apply a total of 2 pumps every morning to your shoulders).   Discontinued Medications    MUPIROCIN (BACTROBAN)  2 % OINTMENT    Apply topically 2 (two) times daily.      Time spent with pt including note preparation: 80 minutes     Reva Olivia, PhD, MP  Medical Psychologist

## 2025-01-08 ENCOUNTER — OFFICE VISIT (OUTPATIENT)
Dept: PSYCHIATRY | Facility: CLINIC | Age: 55
End: 2025-01-08
Payer: COMMERCIAL

## 2025-01-08 VITALS — HEART RATE: 96 BPM | DIASTOLIC BLOOD PRESSURE: 75 MMHG | SYSTOLIC BLOOD PRESSURE: 131 MMHG

## 2025-01-08 DIAGNOSIS — F43.81 PERSISTENT COMPLEX BEREAVEMENT DISORDER: ICD-10-CM

## 2025-01-08 DIAGNOSIS — F10.11 ALCOHOL USE DISORDER, MILD, IN EARLY REMISSION: ICD-10-CM

## 2025-01-08 DIAGNOSIS — F17.201 TOBACCO USE DISORDER, SEVERE, IN EARLY REMISSION: Primary | ICD-10-CM

## 2025-01-08 PROCEDURE — 3078F DIAST BP <80 MM HG: CPT | Mod: CPTII,S$GLB,, | Performed by: PSYCHOLOGIST

## 2025-01-08 PROCEDURE — 99999 PR PBB SHADOW E&M-EST. PATIENT-LVL II: CPT | Mod: PBBFAC,,, | Performed by: PSYCHOLOGIST

## 2025-01-08 PROCEDURE — 3075F SYST BP GE 130 - 139MM HG: CPT | Mod: CPTII,S$GLB,, | Performed by: PSYCHOLOGIST

## 2025-01-08 PROCEDURE — 1159F MED LIST DOCD IN RCRD: CPT | Mod: CPTII,S$GLB,, | Performed by: PSYCHOLOGIST

## 2025-01-08 PROCEDURE — 99215 OFFICE O/P EST HI 40 MIN: CPT | Mod: S$GLB,,, | Performed by: PSYCHOLOGIST

## 2025-01-08 RX ORDER — NALTREXONE HYDROCHLORIDE 50 MG/1
50 TABLET, FILM COATED ORAL DAILY
Qty: 30 TABLET | Refills: 2 | Status: SHIPPED | OUTPATIENT
Start: 2025-01-08 | End: 2025-04-08

## 2025-01-08 NOTE — PROGRESS NOTES
"MEDICATION MANAGEMENT SESSION    Name: Stephon Lynne  Age: 54 y.o.  : 1970    Preferred Name: Brandon    Referring provider: Dr. Fredi Landis    Reason for Visit:  Medication follow up    LAST VISIT 24:  Pt is referred by his PCP, Dr. Fredi Landis, for management of substance use.   Pt reports concern regarding his "addictive personality" that led to drinking heavily to self-medicate grief after his mother's death. He wants to break this cycle of using substances if there is an underlying issue such as depression compelling use. His wife thinks he's depressed, and this is why he would drink, smoke, and now chews tobacco. He reports a pattern of quitting one substance then using another. He reports experiencing ongoing cravings to drink though he's been sober for the past 1.5 years. Substances "took away my drive" to do other things he used to enjoy.    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 He did experience relationship problems with his wife; she grew up in household with addiction problems so recognized his alcohol problem. He was "good at hiding it" from his wife and others but felt depressed hiding it from her since he's always been honest with her about everything.     Pt started smoking cigarettes at age 20, smoked 1-1.5 packs (20-30 cigs per day) until he quit smoking in . He then started using smokeless tobacco/chew and quit 2 years ago on his own but continues to have tobacco cravings as well; unable to say if still uses occasionally. He's never used nicotine replacement therapy (NRT) of any kind. He was interested in starting Wellbutrin for smoking cessation (wife is taking it for depression), but it's contraindicated due to history of " "seizures.    He reports no history of mental health issues. He denies current mood/depression problems; sleep and appetite are normal. He does endorse current fatigue and poor concentration. He does not report past or present symptoms consistent with a depressive episode. He's never been tested or diagnosed with attention problems, ADHD. He denies significant anxiety/nervousness. He does not report past or present symptoms consistent with a manic/hypomanic episode. He has no past suicide attempts and no past psychiatric hospitalizations. He's never experienced psychotic symptoms.    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 He's been  for the past 25 years; his wife is an RN in the [employer] (both employed [employer]). He has a son (age 29) from his wife's previous marriage though pt has been father to him since son was 3 y.o. He has another "son" (age 24; family friend) he never adopted but just like a son to him, and 1 grandchild. Pt reports he's recently learned much about his childhood and family from his older sister--his father who raised him wasn't biological father, and his mother's "lifestyle" prior to his birth; she apparently drank and used substances heavily but "turned her life around" when he was born. He was born with a heart defect and not expected to live past age 5. He reports from what he recalls his childhood was good. He's the youngest of 4 (3 siblings); his parents are both . Pt reports having strong anderson that helps him cope.    Discussed with pt Naltrexone (Depade) oral tab and Vivitrol injection for alcohol, drug cravings; reviewed associated benefits and risks. Pt will consider referral to social work for individual therapy; will revisit at 4-week follow up for medication management.     ADHD: inattentive   Depressive Disorder: tired/fatigued, concentration problems   Anxiety Disorder: fatigue, concentration problems   Panic " Disorder: denied   Manic Disorder: denied   Psychotic Disorder: denied   Substance Use:  Alcohol: prior heavy drinking but sober past 1.5 years and Nicotine: cigarette smoking since age 20, quit 2002, 1-1.5 packs per day, chewing/smokeless tobacco; quit 2 years ago     CURRENT VISIT:  Did good with naltrexone; went back and forth taking it in morning and evening; some nausea at first but resolved;   Notices not compelled to use and more likely to think of other things he can do and move on more easily; not dwelling on it  Anxiety not any longer bec what caused anx was when and how to drink/smoke and felt guilty  Started drawing again; 1-2 times per week  Tested patience when son and grand-daughter moved back in with them  Feels better not hiding from wife; able to be accountable and show recepts to wife and feel good about being honest; very reinforcing to see wife's face when blows zero on breathalyzer, earning her trust back    Reviewed/discussed relapse prevention principles; planning for high-risk situations; AVE    PLAN:  Continue Depade (naltrexone) 50 mg qd;  RTC in 3 months.    Current Symptoms:   ADHD: denied   Depressive Disorder: tired/fatigued   Anxiety Disorder: anxiety/nervousness   Panic Disorder: denied   Manic Disorder: denied   Psychotic Disorder: denied   Substance Use:  Alcohol: prior heavy drinking but sober past 1.5 years and Nicotine: cigarette smoking since age 20, quit 2002, 1-1.5 packs per day, chewing/smokeless tobacco; quit 2 years ago     Review of Systems   Constitutional:  Positive for fatigue. Negative for activity change, appetite change and unexpected weight change.   Respiratory:  Negative for shortness of breath.    Psychiatric/Behavioral:  Negative for agitation, behavioral problems, confusion, decreased concentration, dysphoric mood, hallucinations, self-injury, sleep disturbance and suicidal ideas. The patient is not nervous/anxious and is not hyperactive.      "  Constitutional:  Vitals:  Most recent vital signs were reviewed.   Last 3 sets of Vitals        9/9/2024     7:43 AM 9/9/2024     1:13 PM 1/8/2025     2:55 PM   Vitals - 1 value per visit   SYSTOLIC 104 125 131   DIASTOLIC 72 81 75   Pulse 73 70 96   Temp 97.2 °F (36.2 °C)     Resp  16    SPO2 96 %     Weight (lb) 252.76 252.87    Weight (kg) 114.65 114.7    Pain Score Zero Zero           Psychiatric:  Oriented: x 3   Attitude: cooperative   Eye Contact: good   Behavior: wnl   Mood: "good"  Affect: brighter; appropriate range   Attention: intact   Concentration: grossly intact   Thought Process: goal directed   Speech: intelligible  Volume: WNL   Quantity: WNL   Rhythm: WNL  Insight: good   Threats: no SI / HI   Memory: Grossly intact  Psychosis: denies all   Estimate of Intellectual Function: average   Judgment:  good  Relevant Elements of Neurological Exam: normal gait     Allergy Review:   Review of patient's allergies indicates:  No Known Allergies     Medical Problem List:   Patient Active Problem List   Diagnosis    Acromegaly    Sexual dysfunction    Erectile dysfunction    Prostatism    Testosterone deficiency    YURI on CPAP    Near syncope    Abnormal echocardiogram    Family history of premature CAD    Abnormal stress test    Class 1 obesity without serious comorbidity with body mass index (BMI) of 30.0 to 30.9 in adult    SOB (shortness of breath) on exertion    Abnormal EKG    Congenital pectus excavatum    Pulmonary sequestration    Simple chronic bronchitis    Pulmonary air trapping      Encounter Diagnoses   Name Primary?    Alcohol use disorder, mild, in early remission     Tobacco use disorder, severe, in early remission Yes    Persistent complex bereavement disorder       PLAN:  Medication Management: Continue current medications.    Follow up in about 3 months (around 4/8/2025) for Medication follow up.     Medication List with Changes/Refills   Current Medications    ALBUTEROL " (PROVENTIL/VENTOLIN HFA) 90 MCG/ACTUATION INHALER    Inhale 2 puffs into the lungs every 4 (four) hours as needed for Shortness of Breath. Rescue    BRIMONIDINE 0.2% (ALPHAGAN) 0.2 % DROP    Place one drop in both eyes twice daily    ONDANSETRON (ZOFRAN-ODT) 4 MG TBDL    Take 1 tablet (4 mg total) by mouth every 6 (six) hours as needed.    TESTOSTERONE (ANDROGEL) 20.25 MG/1.25 GRAM (1.62 %) GLPM    Place 2 Pump onto the skin As instructed (Apply a total of 2 pumps every morning to your shoulders).   Changed and/or Refilled Medications    Modified Medication Previous Medication    NALTREXONE (DEPADE) 50 MG TABLET naltrexone (DEPADE) 50 mg tablet       Take 1 tablet (50 mg total) by mouth once daily.    Take 1 tablet (50 mg total) by mouth once daily.      Time spent with pt including note preparation: 45 minutes     Reva Olivia, PhD, MP  Medical Psychologist

## 2025-01-10 ENCOUNTER — LAB VISIT (OUTPATIENT)
Dept: LAB | Facility: HOSPITAL | Age: 55
End: 2025-01-10
Attending: UROLOGY
Payer: COMMERCIAL

## 2025-01-10 DIAGNOSIS — E29.1 MALE HYPOGONADISM: ICD-10-CM

## 2025-01-10 LAB
HCT VFR BLD AUTO: 45.3 % (ref 40–54)
HGB BLD-MCNC: 15 G/DL (ref 14–18)
TESTOST SERPL-MCNC: 396 NG/DL (ref 304–1227)

## 2025-01-10 PROCEDURE — 84403 ASSAY OF TOTAL TESTOSTERONE: CPT | Performed by: UROLOGY

## 2025-01-10 PROCEDURE — 36415 COLL VENOUS BLD VENIPUNCTURE: CPT | Mod: PO | Performed by: UROLOGY

## 2025-01-10 PROCEDURE — 85018 HEMOGLOBIN: CPT | Performed by: UROLOGY

## 2025-01-10 PROCEDURE — 85014 HEMATOCRIT: CPT | Performed by: UROLOGY

## 2025-01-10 PROCEDURE — 82671 ASSAY OF ESTROGENS: CPT | Performed by: UROLOGY

## 2025-01-14 ENCOUNTER — PATIENT MESSAGE (OUTPATIENT)
Dept: UROLOGY | Facility: CLINIC | Age: 55
End: 2025-01-14
Payer: COMMERCIAL

## 2025-01-14 ENCOUNTER — TELEPHONE (OUTPATIENT)
Dept: UROLOGY | Facility: CLINIC | Age: 55
End: 2025-01-14
Payer: COMMERCIAL

## 2025-01-14 NOTE — TELEPHONE ENCOUNTER
I attempted to contact pt to provide test results; no answer.  Left vm and msg sent also via [employer].  Nurse More  ----- Message from Hermes Conteh MD sent at 1/13/2025  6:07 PM CST -----  Please call Mr Lynne and let him know that I reviewed his lab result and his testosterone has mildly improved from 302 four months ago to 396.  His hemoglobin and hematocrit are both normal.  His estrogen level is pending.  Ask him if he is still using the androgel 2 pumps every morning.  Ask him if he would like to reschedule his appointment with me (he was scheduled to see me today).

## 2025-01-17 LAB
ESTRADIOL SERPL HS-MCNC: 20 PG/ML (ref 10–42)
ESTROGEN SERPL CALC-MCNC: 47 PG/ML (ref 19–69)
ESTRONE SERPL-MCNC: 27 PG/ML (ref 9–36)

## 2025-02-12 ENCOUNTER — OFFICE VISIT (OUTPATIENT)
Dept: PSYCHIATRY | Facility: CLINIC | Age: 55
End: 2025-02-12
Payer: COMMERCIAL

## 2025-02-12 DIAGNOSIS — F32.A DEPRESSION, UNSPECIFIED DEPRESSION TYPE: Primary | ICD-10-CM

## 2025-02-12 DIAGNOSIS — Z63.9 FAMILY DYNAMICS PROBLEM: ICD-10-CM

## 2025-02-12 DIAGNOSIS — F10.11 HISTORY OF ALCOHOL ABUSE: ICD-10-CM

## 2025-02-12 DIAGNOSIS — F43.81 PERSISTENT COMPLEX BEREAVEMENT DISORDER: ICD-10-CM

## 2025-02-12 PROCEDURE — 98002 SYNCH AUDIO-VIDEO NEW MOD 45: CPT | Mod: 95,,, | Performed by: SOCIAL WORKER

## 2025-02-12 SDOH — SOCIAL DETERMINANTS OF HEALTH (SDOH): PROBLEM RELATED TO PRIMARY SUPPORT GROUP, UNSPECIFIED: Z63.9

## 2025-02-12 NOTE — PROGRESS NOTES
Psychiatry Initial Visit (PhD/LCSW)  Diagnostic Interview - CPT 02657    Date: 2025    Site: [place]       --Via virtual visit with synchronous audio and video.  Patient presented at home, in the state [facility].   Each patient to whom medical services by telemedicine is provided is:  (1) informed of the relationship between the physician and patient and the respective role of any other health care provider with respect to management of the patient; and (2) notified that he or she may decline to receive medical services by telemedicine and may withdraw from such care at any time.    Referral source: No ref. provider found    Clinical status of patient: Outpatient    Stephon Brandon Lynne, a 54 y.o. male, for initial evaluation visit.  Met with patient.    Chief complaint/reason for encounter: addictive disorder, depression, and interpersonal    History of present illness:  54 year old male patient presented from home, via virtual visit, no prior psych history before seeing Dr. Reva Olivia recently. Patient chief complaint of alcohol misuse. Previously was nicotine dependent and still is triggered to want to use. States awareness that stress triggers him to want to use. Last alcohol drink 2 and a half years ago.  Last daily drinking was 10 years ago. Endorses chronic anxiety. Reports father  in 2018, and some family secrets were uncovered. [place] his mother, who was his anchor, had been an active alcoholic, His 3 older siblings were over a decade older and experienced a much different, less functional mother; patient is now struggling to reconcile his image of her with the new information. Patient said he recognizes a pattern in himself of turning to some addictive fix, first tobacco, then alcohol, and recently back to another form of tobacco. He had smoked 12 years, between a pack and a pack and a half a day.  to . Heavy drinking from  to . Started chewing tobacco in early , a few  months after his sister . Used for 4 to 5 months before confessing to his wife. Feeling bad about his hiding behavior, embarrassed and concerned.   Mother had  , from complications of surgery. After that is when the patient started learning from his oldest siblings a lot of negative secrets he had been unaware of, including his mother's drinking and promiscuity, and the fact that both he and his 8 years-older brother have different biological fathers, while their legal father who raised them was the biological father of his two older sisters. Patient stated he is primarily concerned currently about his own impulse to lie or omit or hide information from his wife. He fears becoming in some way like his mother. His goal is to learn to be fully accountable and honest. Patient denied any si/hi, mood swings, psychosis, or problem use of illicit drugs.     Pain: noncontributory    Symptoms:   Mood: depressed mood and worthlessness/guilt  Anxiety: restlessness/keyed up  Substance abuse: substance tolerance  Cognitive functioning: denied  Health behaviors: noncontributory    Psychiatric history: currently under psychiatric care    Medical history: reported a congetical heart condition diagnosed at birth, 2 holes in his heart, Somehow by age 5, his heart appeared to have healed itself.     Family history of psychiatric illness:  late mother had alcohol abuse history prior to the patient's memory.     Social history (marriage, employment, etc.):  [place] born and raised. One year spent in [place] in his late teens, due to father's job. Then spent 2 years after graduating from [employer] in [facility] [facility] in [facility]. Baby of the family. Much younger than 3 siblings. 14, 11, and 8 years older. Oldest 2 are the sisters; brother is 8 years older than patient. Parents have both passed, mother , father 2018, oldest sister in . Happy, structured, safe feeling childhood. Apparently in sharp  contract from what his sisters experienced.     Substance use:   Alcohol:  none currently; remote history of past alcohol abuse; see history   Drugs: none   Tobacco: 14 year history of chewing tobacco use; quit Dec. Of 2024   Caffeine: not reported    Current medications and drug reactions (include OTC, herbal): see medication list      Strengths and liabilities: Strength: Patient accepts guidance/feedback    Current Evaluation:     Mental Status Exam:  General Appearance:  unremarkable, age appropriate, normal weight, casually dressed   Speech: normal tone, normal rate, normal pitch, normal volume      Level of Cooperation: cooperative      Thought Processes: goal-directed   Mood: depressed      Thought Content: normal, no suicidality, no homicidality, delusions, or paranoia   Affect: congruent and appropriate   Orientation: Oriented x3   Memory: Recent and remote memory intact   Attention Span & Concentration: intact   Fund of General Knowledge: intact and appropriate to age and level of education   Abstract Reasoning: Not formally assessed   Judgment & Insight: limited     Language  intact     Diagnostic Impression - Plan:       ICD-10-CM ICD-9-CM   1. Depression, unspecified depression type  F32.A 311   2. Persistent complex bereavement disorder  F43.81 309.0   3. History of alcohol abuse  F10.11 305.03   4. Family dynamics problem  Z63.9 V61.9       Plan:individual psychotherapy and medication management by physician    Return to Clinic: as soon a biweekly, as schedule permits, tba    Length of Service (minutes):  50

## 2025-04-11 ENCOUNTER — OFFICE VISIT (OUTPATIENT)
Facility: CLINIC | Age: 55
End: 2025-04-11
Payer: COMMERCIAL

## 2025-04-11 VITALS
WEIGHT: 252.88 LBS | DIASTOLIC BLOOD PRESSURE: 79 MMHG | RESPIRATION RATE: 16 BRPM | BODY MASS INDEX: 32.47 KG/M2 | SYSTOLIC BLOOD PRESSURE: 129 MMHG | HEART RATE: 80 BPM

## 2025-04-11 DIAGNOSIS — E29.1 MALE HYPOGONADISM: ICD-10-CM

## 2025-04-11 DIAGNOSIS — Z12.5 PROSTATE CANCER SCREENING: ICD-10-CM

## 2025-04-11 DIAGNOSIS — E29.1 MALE HYPOGONADISM: Primary | ICD-10-CM

## 2025-04-11 PROCEDURE — 99999 PR PBB SHADOW E&M-EST. PATIENT-LVL III: CPT | Mod: PBBFAC,,, | Performed by: UROLOGY

## 2025-04-11 RX ORDER — TESTOSTERONE 20.25 MG/1.25G
2 GEL TOPICAL SEE ADMIN INSTRUCTIONS
Qty: 75 G | Refills: 0 | Status: SHIPPED | OUTPATIENT
Start: 2025-04-11

## 2025-04-11 NOTE — TELEPHONE ENCOUNTER
MA received medication refill for testosterone (ANDROGEL) 20.25 mg/1.25 gram (1.62 %) GlPm . Last office visit was 9/9/25 with instructions to Place 2 Pump onto the skin As instructed (Apply a total of 2 pumps every morning to your shoulders) . Follow up hubert scheduled for 1/10/25, which was canceled and no showed on 1/13/25.Last prescription for testosterone (ANDROGEL) 20.25 mg/1.25 gram (1.62 %) GlPm  was sent to the pharmacy on 9 /9/24 with 5 refills. Prescription routed to provider.    Please see the attached refill request.

## 2025-04-11 NOTE — PROGRESS NOTES
Subjective:       Patient ID: Stephon Lynne is a 54 y.o. male.    Chief Complaint: Follow-up      History of Present Illness:     Mr Lynne has hypogonadism.  He was on androgel from 2016 to 2018.  I restarted androgel 1.62% 2 pumps daily on 9-9-24 and his energy level has improved.  His fatigue has resolved.  Normal libido.  No problems with ED.  He has never been on testosterone injections.    Follow-up  Pertinent negatives include no chest pain, chills, fever, nausea, rash or vomiting.        Past Medical History:   Diagnosis Date    Abnormal echocardiogram 08/07/2019    Abnormal EKG 02/21/2024    Acromegaly     Congenital pectus excavatum     Family history of premature CAD 08/07/2019    Hypogonadism in male     Near syncope 08/07/2019    Other chest pain 08/07/2019    Sleep apnea      Family History   Problem Relation Name Age of Onset    Aortic aneurysm Mother      Hyperlipidemia Father      Pulmonary embolism Sister phillip     Pulmonary embolism Sister josiah     Heart attack Brother Papito      Social History[1]  Encounter Medications[2]     Review of Systems   Constitutional:  Negative for chills and fever.   Respiratory:  Negative for shortness of breath.    Cardiovascular:  Negative for chest pain.   Gastrointestinal:  Negative for nausea and vomiting.   Musculoskeletal:  Negative for back pain.   Skin:  Negative for rash.   Neurological:  Negative for dizziness.   Psychiatric/Behavioral:  Negative for agitation.        Objective:     /79 (BP Location: Left arm, Patient Position: Sitting)   Pulse 80   Resp 16   Wt 114.7 kg (252 lb 13.9 oz)   BMI 32.47 kg/m²     Physical Exam  Constitutional:       Appearance: Normal appearance.   Pulmonary:      Effort: Pulmonary effort is normal.   Abdominal:      Palpations: Abdomen is soft.   Neurological:      Mental Status: He is alert and oriented to person, place, and time.   Psychiatric:         Mood and Affect: Mood normal.         No visits with  results within 1 Day(s) from this visit.   Latest known visit with results is:   Lab Visit on 01/10/2025   Component Date Value Ref Range Status    Testosterone, Total 01/10/2025 396  304 - 1227 ng/dL Final    Hemoglobin 01/10/2025 15.0  14.0 - 18.0 g/dL Final    Hematocrit 01/10/2025 45.3  40.0 - 54.0 % Final    Estrone (Esoterix) 01/10/2025 27  9 - 36 pg/mL Final    Estradiol 01/10/2025 20  10 - 42 pg/mL Final    Estrogen 01/10/2025 47  19 - 69 pg/mL Final    Comment: This test was developed and its performance characteristics   determined by [facility] in a manner consistent  with CLIA requirements. This test has not been cleared or   approved by the U.S. Food and Drug Administration. This test  is used for patient testing purposes. It should not be   regarded as investigational or for research.    Test performed at [facility],  [address] , [place]  [address]     [phone]  Michela Acharya MD, PhD - Medical Director          No results found for this or any previous visit (from the past 8760 hours).     Assessment:       1. Male hypogonadism    2. Prostate cancer screening        Plan:     Orders Placed This Encounter    Comprehensive Metabolic Panel    Testosterone    Hemoglobin    Hematocrit    Estrogens, Total    PSA, Screening        9-9-24  PSA 0.18    1-10-25  Total testosterone 396    9-4-24  Total testosterone 302    5-9-17  Total testosterone 393     10-6-16  Total testosterone 142     11-13-15  Total testosterone 191    1-10-25  Total estrogens 47    1-10-25  H/H 15.0/45.3    9-9-24  Cr 0.8.  GFR >60.     I reviewed the above lab results.            Assessment:  - Hypogonadism.  He was on androgel from 2016 to 2018.  I restarted androgel 1.62% 2 pumps daily on 9-9-24 and his energy level has improved.  Fatigue has resolved.  He has never been on testosterone injections.  - History of a right hydrocelectomy and a right inguinal hernia repair in the same setting  when he was 15 year old per the patient.     Plan:  - the mutual decision was made for him to continue with his current TRT regimen.  - Continue androgel 1.62% 2 pumps qdaily.  - Total testosterone, total estrogens, H/H, CMP, and PSA in 4 months a few days prior to a 4 month appointment in the morning hours.   - RTC in 4 months.                         [1]   Social History  Socioeconomic History    Marital status:    Tobacco Use    Smoking status: Former     Current packs/day: 0.00     Average packs/day: 1.5 packs/day for 12.0 years (18.0 ttl pk-yrs)     Types: Cigarettes     Start date: 1990     Quit date: 2002     Years since quittin.2     Passive exposure: Never    Smokeless tobacco: Never   Substance and Sexual Activity    Alcohol use: Not Currently     Alcohol/week: 0.0 standard drinks of alcohol    Drug use: No     Social Drivers of Health     Financial Resource Strain: Low Risk  (2025)    Overall Financial Resource Strain (CARDIA)     Difficulty of Paying Living Expenses: Not hard at all   Food Insecurity: No Food Insecurity (2025)    Hunger Vital Sign     Worried About Running Out of Food in the Last Year: Never true     Ran Out of Food in the Last Year: Never true   Transportation Needs: No Transportation Needs (2025)    PRAPARE - Transportation     Lack of Transportation (Medical): No     Lack of Transportation (Non-Medical): No   Physical Activity: Insufficiently Active (2025)    Exercise Vital Sign     Days of Exercise per Week: 3 days     Minutes of Exercise per Session: 20 min   Stress: No Stress Concern Present (2025)    [place] [employer] - Occupational Stress Questionnaire     Feeling of Stress : Not at all   Housing Stability: Low Risk  (2025)    Housing Stability Vital Sign     Unable to Pay for Housing in the Last Year: No     Number of Times Moved in the Last Year: 0     Homeless in the Last Year: No   [2]   Outpatient  Encounter Medications as of 4/11/2025   Medication Sig Dispense Refill    albuterol (PROVENTIL/VENTOLIN HFA) 90 mcg/actuation inhaler Inhale 2 puffs into the lungs every 4 (four) hours as needed for Shortness of Breath. Rescue 18 g 11    brimonidine 0.2% (ALPHAGAN) 0.2 % Drop Place one drop in both eyes twice daily 5 mL 3    naltrexone (DEPADE) 50 mg tablet Take 1 tablet (50 mg total) by mouth once daily. 30 tablet 2    ondansetron (ZOFRAN-ODT) 4 MG TbDL Take 1 tablet (4 mg total) by mouth every 6 (six) hours as needed. 20 tablet 0    testosterone (ANDROGEL) 20.25 mg/1.25 gram (1.62 %) GlPm Place 2 Pump onto the skin As instructed (Apply a total of 2 pumps every morning to your shoulders). 75 g 0    [DISCONTINUED] testosterone (ANDROGEL) 20.25 mg/1.25 gram (1.62 %) GlPm Place 2 Pump onto the skin As instructed (Apply a total of 2 pumps every morning to your shoulders). 75 g 5     No facility-administered encounter medications on file as of 4/11/2025.

## 2025-05-23 DIAGNOSIS — E29.1 MALE HYPOGONADISM: ICD-10-CM

## 2025-05-23 RX ORDER — TESTOSTERONE 20.25 MG/1.25G
2 GEL TOPICAL SEE ADMIN INSTRUCTIONS
Qty: 75 G | Refills: 0 | Status: SHIPPED | OUTPATIENT
Start: 2025-05-23

## 2025-05-28 ENCOUNTER — OFFICE VISIT (OUTPATIENT)
Dept: PSYCHIATRY | Facility: CLINIC | Age: 55
End: 2025-05-28
Payer: COMMERCIAL

## 2025-05-28 DIAGNOSIS — F17.201 TOBACCO USE DISORDER, SEVERE, IN EARLY REMISSION: ICD-10-CM

## 2025-05-28 DIAGNOSIS — F43.81 PERSISTENT COMPLEX BEREAVEMENT DISORDER: ICD-10-CM

## 2025-05-28 DIAGNOSIS — F10.11 ALCOHOL USE DISORDER, MILD, IN EARLY REMISSION: ICD-10-CM

## 2025-05-28 DIAGNOSIS — F32.A DEPRESSION, UNSPECIFIED DEPRESSION TYPE: ICD-10-CM

## 2025-05-28 DIAGNOSIS — F10.11 ALCOHOL USE DISORDER, MILD, IN SUSTAINED REMISSION: Primary | ICD-10-CM

## 2025-05-28 RX ORDER — NALTREXONE HYDROCHLORIDE 50 MG/1
50 TABLET, FILM COATED ORAL DAILY
Qty: 90 TABLET | Refills: 3 | Status: SHIPPED | OUTPATIENT
Start: 2025-05-28 | End: 2026-05-28

## 2025-05-28 NOTE — PROGRESS NOTES
"MEDICATION MANAGEMENT SESSION: VIRTUAL    Name: Stephon Lynne  Age: 54 y.o.  : 1970    Preferred Name: [place]    Site: [facility]--via virtual visit with synchronous audio and video. Patient presented at home, in the [place].   Each patient to whom medical services by telemedicine is provided is:   (1) informed of the relationship between the physician and patient and the respective role of any other health care provider with respect to management of the patient;   (2) notified that he or she may decline to receive medical services by telemedicine and may withdraw from such care at any time.    Referring provider: Dr. Fredi Landis    Reason for Visit:  Medication follow up    History of Present Illness    CHIEF COMPLAINT:  Patient presents for medication refill and follow-up, last seen by the provider in February.    HPI:  Patient's medication ran out unexpectedly, and he can tell a difference when not taking it. He experiences cravings, particularly while driving for work, describing it as a "boredom thing" and a subconscious urge to engage in past behaviors. Patient reports that drinking is no longer an issue, even in social situations where others are consuming alcohol. He attributes past drinking to underlying grief issues and is working on replacing old habits with positive ones, particularly in relation to tobacco use.    Patient works in maintenance for multiple clinics, which involves significant driving time, identified as a trigger for cravings. His wife's mother recently passed away from a brain tumor, which has been a source of stress. Patient is aware of the need to develop new coping strategies to replace old habits, acknowledging the importance of creating new, positive habits to manage cravings, particularly during driving time.    MEDICATIONS:  Patient was on oral Naltrexone but discontinued it unintentionally due to the prescription running out.    FAMILY HISTORY:  Family " "history is significant for mother-in-law who recently passed away from a brain tumor.    SUBSTANCE USE:  Patient previously used alcohol, possibly related to grief. He quit drinking since October, which coincided with his son's wedding. He is no longer tempted by alcohol. Patient also previously used chewing tobacco but recently quit. However, he is experiencing cravings, especially while driving. Regarding prescription medication, the patient is on Naltrexone.    LIFESTYLE:  Patient works as a  for all [employer]. His job involves significant driving time.        COGNITIVE RELAPSE MODEL (Donovan & Oumar, 1985):  Discussed with pt the cognitive relapse model (Donovan & Oumar, 1985) emphasizing importance of planning in advance how to cope effectively with or avoid high-risk situations for preventing lapses and relapses. High-risk situations for relapse are ones in which associated cues are present (e.g., being around other smokers/drinkers at happy hour) and positive outcome expectancies (e.g., "smoking helps me relax") for smoking/drinking. Other features of high risk situations--negative emotional states, social pressure, exposure to smoking-relevant stimuli--are also important and increase risk of lapse/relapse. 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     ADHD: denied   Depressive Disorder: tired/fatigued   Anxiety Disorder: anxiety/nervousness   Panic Disorder: denied   Manic Disorder: denied   Psychotic Disorder: denied   Substance Use:  Alcohol: prior heavy drinking but sober past 1.5 years and Nicotine: cigarette smoking since age 20, quit 2002, 1-1.5 packs per day, chewing/smokeless tobacco; quit 2 years ago     Review of Systems   Constitutional:  Positive for fatigue. Negative for activity change, appetite change and unexpected weight change.   Respiratory:  Negative for " "shortness of breath.    Psychiatric/Behavioral:  Negative for agitation, behavioral problems, confusion, decreased concentration, dysphoric mood, hallucinations, self-injury, sleep disturbance and suicidal ideas. The patient is nervous/anxious. The patient is not hyperactive.       Constitutional:  Vitals:  Most recent vital signs were reviewed.   Last 3 sets of Vitals        9/9/2024     1:13 PM 1/8/2025     2:55 PM 4/11/2025     1:54 PM   Vitals - 1 value per visit   SYSTOLIC 125 131 129   DIASTOLIC 81 75 79   Pulse 70 96 80   Resp 16  16   Weight (lb) 252.87  252.87   Weight (kg) 114.7  114.7   Pain Score Zero  Zero          Psychiatric:  Oriented: x 3   Attitude: cooperative   Eye Contact: good   Behavior: wnl   Mood: "good"  Affect: appropriate range   Attention: intact   Concentration: grossly intact   Thought Process: goal directed   Speech: intelligible  Volume: WNL   Quantity: WNL   Rhythm: WNL  Insight: fair to good   Threats: no SI / HI   Memory: Intact  Psychosis: denies all   Estimate of Intellectual Function: average   Judgment:  good  Relevant Elements of Neurological Exam: normal gait     Allergy Review:   Review of patient's allergies indicates:  No Known Allergies     Medical Problem List:   Problem List[1]     Encounter Diagnoses   Name Primary?    Depression, unspecified depression type     Persistent complex bereavement disorder     Alcohol use disorder, mild, in sustained remission Yes    Alcohol use disorder, mild, in early remission     Tobacco use disorder, severe, in early remission         PLAN:    Assessment & Plan    F10.21 Alcohol dependence, in remission  F17.200 Nicotine dependence, unspecified, uncomplicated  Z63.4 Disappearance and death of family member    IMPRESSION:  - Acknowledged recent life stressors and unintentional medication discontinuation.  - Recognized impact of conditioned cues (e.g., driving) on urges for tobacco use and discussed psychological aspects of addiction, " including role of environmental triggers in eliciting cravings.  - Considered effectiveness of Naltrexone in managing urges and provided 12-month prescription.  - Emphasized importance of developing alternative coping strategies to replace habitual behaviors.  - Emphasized gradual nature of overcoming habitual behaviors (repeated exposure to associated cues, plans for coping with high-risk situations) and need for patience in process.    PLAN SUMMARY:  1. Use cognitive and behavioral coping skills to manage tobacco urges  2. Focus on short-term goals for tobacco cessation  3. Prescribed Naltrexone for 12 months to manage urges  4. Use alternative activities while driving to manage tobacco cravings    PLAN NOTE:  1. Discussed difference between physiological withdrawal and psychological cravings.  2. Recommend using alternative activities or distractions while driving to manage tobacco cravings, such as listening to podcasts or music.  3. Recommend using cognitive and behavioral coping skills to manage urges, rather than relying solely on willpower.  4. Recommend thinking in short-term goals (e.g., getting through rest of day) rather than focusing on never using tobacco again.  5. Considered effectiveness of Naltrexone in managing urges and provided 12-month prescription.       Follow up in about 6 months (around 11/28/2025) for Medication follow up.     Medication List with Changes/Refills   Current Medications    ALBUTEROL (PROVENTIL/VENTOLIN HFA) 90 MCG/ACTUATION INHALER    Inhale 2 puffs into the lungs every 4 (four) hours as needed for Shortness of Breath. Rescue    BRIMONIDINE 0.2% (ALPHAGAN) 0.2 % DROP    Place one drop in both eyes twice daily    ONDANSETRON (ZOFRAN-ODT) 4 MG TBDL    Take 1 tablet (4 mg total) by mouth every 6 (six) hours as needed.    TESTOSTERONE (ANDROGEL) 20.25 MG/1.25 GRAM (1.62 %) GLPM    Place 2 Pump onto the skin As instructed (Apply a total of 2 pumps every morning to your  shoulders).   Changed and/or Refilled Medications    Modified Medication Previous Medication    NALTREXONE (DEPADE) 50 MG TABLET naltrexone (DEPADE) 50 mg tablet       Take 1 tablet (50 mg total) by mouth once daily.    Take 1 tablet (50 mg total) by mouth once daily.      Time spent with pt including note preparation: 35 minutes     Reva Olivia, PhD, MP  Medical Psychologist    This note was generated with the assistance of ambient listening technology. Verbal consent was obtained by the patient and accompanying visitor(s) for the recording of patient appointment to facilitate this note. I attest to having reviewed and edited the generated note for accuracy, though some syntax or spelling errors may persist. Please contact the author of this note for any clarification.    Visit type: audiovisual    Face to Face time with patient: 32  35 minutes of total time spent on the encounter, which includes face to face time and non-face to face time preparing to see the patient (eg, review of tests), Obtaining and/or reviewing separately obtained history, Documenting clinical information in the electronic or other health record, Independently interpreting results (not separately reported) and communicating results to the patient/family/caregiver, or Care coordination (not separately reported).     Each patient to whom he or she provides medical services by telemedicine is:  (1) informed of the relationship between the physician and patient and the respective role of any other health care provider with respect to management of the patient; and (2) notified that he or she may decline to receive medical services by telemedicine and may withdraw from such care at any time.    New Patient Virtual Visit requirements-  65425  SYNCHRONOUS AUDIO-VIDEO VISIT, NEW, SF MDM 15+ MIN  89469  SYNCHRONOUS AUDIO-VIDEO VISIT, NEW, LOW MDM 30+ MIN  85100  SYNCHRONOUS AUDIO-VIDEO VISIT, NEW, MOD MDM 45+ MIN  25419  SYNCHRONOUS AUDIO-VIDEO  VISIT, NEW, HIGH MDM 60+ MIN     Est Patient Virtual Visit requirements-  77010  SYNCHRONOUS AUDIO-VIDEO VISIT, EST, SF MDM 10+ MIN  75591  SYNCHRONOUS AUDIO-VIDEO VISIT, EST, LOW MDM 20+ MIN  12128  SYNCHRONOUS AUDIO-VIDEO VISIT, EST, MOD MDM 30+ MIN  10770  SYNCHRONOUS AUDIO-VIDEO VISIT, EST, HIGH MDM 40+ MIN  (This text will automatically delete):77370}                         [1]   Patient Active Problem List  Diagnosis    Acromegaly    Sexual dysfunction    Erectile dysfunction    Prostatism    Testosterone deficiency    YURI on CPAP    Near syncope    Abnormal echocardiogram    Family history of premature CAD    Abnormal stress test    Class 1 obesity without serious comorbidity with body mass index (BMI) of 30.0 to 30.9 in adult    SOB (shortness of breath) on exertion    Abnormal EKG    Congenital pectus excavatum    Pulmonary sequestration    Simple chronic bronchitis    Pulmonary air trapping

## 2025-07-02 DIAGNOSIS — E29.1 MALE HYPOGONADISM: ICD-10-CM

## 2025-07-06 RX ORDER — TESTOSTERONE 20.25 MG/1.25G
2 GEL TOPICAL SEE ADMIN INSTRUCTIONS
Qty: 75 G | Refills: 1 | Status: SHIPPED | OUTPATIENT
Start: 2025-07-06

## 2025-07-11 ENCOUNTER — HOSPITAL ENCOUNTER (EMERGENCY)
Facility: HOSPITAL | Age: 55
Discharge: HOME | End: 2025-07-11
Attending: EMERGENCY MEDICINE
Payer: COMMERCIAL

## 2025-07-11 ENCOUNTER — NURSE TRIAGE (OUTPATIENT)
Dept: ADMINISTRATIVE | Facility: CLINIC | Age: 55
End: 2025-07-11
Payer: COMMERCIAL

## 2025-07-11 ENCOUNTER — OFFICE VISIT (OUTPATIENT)
Dept: INTERNAL MEDICINE | Facility: CLINIC | Age: 55
End: 2025-07-11
Payer: COMMERCIAL

## 2025-07-11 VITALS
RESPIRATION RATE: 18 BRPM | DIASTOLIC BLOOD PRESSURE: 98 MMHG | HEART RATE: 75 BPM | SYSTOLIC BLOOD PRESSURE: 165 MMHG | OXYGEN SATURATION: 99 %

## 2025-07-11 VITALS
OXYGEN SATURATION: 96 % | WEIGHT: 248 LBS | HEART RATE: 79 BPM | TEMPERATURE: 98 F | SYSTOLIC BLOOD PRESSURE: 120 MMHG | HEIGHT: 74 IN | DIASTOLIC BLOOD PRESSURE: 62 MMHG | BODY MASS INDEX: 31.83 KG/M2

## 2025-07-11 DIAGNOSIS — R20.0 BILATERAL HAND NUMBNESS: Primary | ICD-10-CM

## 2025-07-11 DIAGNOSIS — R29.818 ACUTE FOCAL NEUROLOGICAL DEFICIT: ICD-10-CM

## 2025-07-11 DIAGNOSIS — R25.1 TREMOR: ICD-10-CM

## 2025-07-11 DIAGNOSIS — R25.1 TREMOR OF BOTH HANDS: ICD-10-CM

## 2025-07-11 DIAGNOSIS — R20.0 LEFT UPPER EXTREMITY NUMBNESS: ICD-10-CM

## 2025-07-11 DIAGNOSIS — R29.898 UPPER EXTREMITY WEAKNESS: Primary | ICD-10-CM

## 2025-07-11 DIAGNOSIS — M47.819 DEGENERATIVE SPINAL ARTHRITIS: ICD-10-CM

## 2025-07-11 LAB
ABSOLUTE EOSINOPHIL (OHS): 0.04 K/UL
ABSOLUTE MONOCYTE (OHS): 0.5 K/UL (ref 0.3–1)
ABSOLUTE NEUTROPHIL COUNT (OHS): 3.91 K/UL (ref 1.8–7.7)
ALBUMIN SERPL BCP-MCNC: 4.3 G/DL (ref 3.5–5.2)
ALP SERPL-CCNC: 53 UNIT/L (ref 40–150)
ALT SERPL W/O P-5'-P-CCNC: 20 UNIT/L (ref 10–44)
ANION GAP (OHS): 6 MMOL/L (ref 8–16)
AST SERPL-CCNC: 20 UNIT/L (ref 11–45)
BASOPHILS # BLD AUTO: 0.03 K/UL
BASOPHILS NFR BLD AUTO: 0.5 %
BILIRUB SERPL-MCNC: 2.3 MG/DL (ref 0.1–1)
BILIRUB UR QL STRIP.AUTO: NEGATIVE
BUN SERPL-MCNC: 12 MG/DL (ref 6–20)
CALCIUM SERPL-MCNC: 9.1 MG/DL (ref 8.7–10.5)
CHLORIDE SERPL-SCNC: 107 MMOL/L (ref 95–110)
CHOLEST SERPL-MCNC: 148 MG/DL (ref 120–199)
CHOLEST/HDLC SERPL: 4.1 {RATIO} (ref 2–5)
CLARITY UR: CLEAR
CO2 SERPL-SCNC: 24 MMOL/L (ref 23–29)
COLOR UR AUTO: YELLOW
CREAT SERPL-MCNC: 0.9 MG/DL (ref 0.5–1.4)
ERYTHROCYTE [DISTWIDTH] IN BLOOD BY AUTOMATED COUNT: 12.3 % (ref 11.5–14.5)
GFR SERPLBLD CREATININE-BSD FMLA CKD-EPI: >60 ML/MIN/1.73/M2
GLUCOSE SERPL-MCNC: 88 MG/DL (ref 70–110)
GLUCOSE UR QL STRIP: NEGATIVE
HCT VFR BLD AUTO: 45.9 % (ref 40–54)
HDLC SERPL-MCNC: 36 MG/DL (ref 40–75)
HDLC SERPL: 24.3 % (ref 20–50)
HGB BLD-MCNC: 15.4 GM/DL (ref 14–18)
HGB UR QL STRIP: NEGATIVE
IMM GRANULOCYTES # BLD AUTO: 0.01 K/UL (ref 0–0.04)
IMM GRANULOCYTES NFR BLD AUTO: 0.2 % (ref 0–0.5)
INR PPP: 1 (ref 0.8–1.2)
KETONES UR QL STRIP: NEGATIVE
LDLC SERPL CALC-MCNC: 91.6 MG/DL (ref 63–159)
LEUKOCYTE ESTERASE UR QL STRIP: NEGATIVE
LYMPHOCYTES # BLD AUTO: 2.02 K/UL (ref 1–4.8)
MCH RBC QN AUTO: 29.3 PG (ref 27–31)
MCHC RBC AUTO-ENTMCNC: 33.6 G/DL (ref 32–36)
MCV RBC AUTO: 87 FL (ref 82–98)
NITRITE UR QL STRIP: NEGATIVE
NONHDLC SERPL-MCNC: 112 MG/DL
NUCLEATED RBC (/100WBC) (OHS): 0 /100 WBC
PH UR STRIP: 7 [PH]
PLATELET # BLD AUTO: 193 K/UL (ref 150–450)
PMV BLD AUTO: 8.9 FL (ref 9.2–12.9)
POCT GLUCOSE: 87 MG/DL (ref 70–110)
POTASSIUM SERPL-SCNC: 3.7 MMOL/L (ref 3.5–5.1)
PROT SERPL-MCNC: 8 GM/DL (ref 6–8.4)
PROT UR QL STRIP: NEGATIVE
PROTHROMBIN TIME: 11.4 SECONDS (ref 9–12.5)
RBC # BLD AUTO: 5.25 M/UL (ref 4.6–6.2)
RELATIVE EOSINOPHIL (OHS): 0.6 %
RELATIVE LYMPHOCYTE (OHS): 31 % (ref 18–48)
RELATIVE MONOCYTE (OHS): 7.7 % (ref 4–15)
RELATIVE NEUTROPHIL (OHS): 60 % (ref 38–73)
SODIUM SERPL-SCNC: 137 MMOL/L (ref 136–145)
SP GR UR STRIP: 1.02
TRIGL SERPL-MCNC: 102 MG/DL (ref 30–150)
TSH SERPL-ACNC: 1.51 UIU/ML (ref 0.4–4)
UROBILINOGEN UR STRIP-ACNC: NEGATIVE EU/DL
WBC # BLD AUTO: 6.51 K/UL (ref 3.9–12.7)

## 2025-07-11 PROCEDURE — 80053 COMPREHEN METABOLIC PANEL: CPT | Performed by: EMERGENCY MEDICINE

## 2025-07-11 PROCEDURE — 93005 ELECTROCARDIOGRAM TRACING: CPT

## 2025-07-11 PROCEDURE — 80061 LIPID PANEL: CPT | Performed by: EMERGENCY MEDICINE

## 2025-07-11 PROCEDURE — 81003 URINALYSIS AUTO W/O SCOPE: CPT | Performed by: EMERGENCY MEDICINE

## 2025-07-11 PROCEDURE — 84443 ASSAY THYROID STIM HORMONE: CPT | Performed by: EMERGENCY MEDICINE

## 2025-07-11 PROCEDURE — 85025 COMPLETE CBC W/AUTO DIFF WBC: CPT | Performed by: EMERGENCY MEDICINE

## 2025-07-11 PROCEDURE — 99999 PR PBB SHADOW E&M-EST. PATIENT-LVL IV: CPT | Mod: PBBFAC,,, | Performed by: PHYSICIAN ASSISTANT

## 2025-07-11 PROCEDURE — 93010 ELECTROCARDIOGRAM REPORT: CPT | Mod: ,,, | Performed by: INTERNAL MEDICINE

## 2025-07-11 PROCEDURE — 99285 EMERGENCY DEPT VISIT HI MDM: CPT | Mod: 25

## 2025-07-11 PROCEDURE — 85610 PROTHROMBIN TIME: CPT | Performed by: EMERGENCY MEDICINE

## 2025-07-11 PROCEDURE — 82962 GLUCOSE BLOOD TEST: CPT

## 2025-07-11 PROCEDURE — 99284 EMERGENCY DEPT VISIT MOD MDM: CPT | Mod: GT,,, | Performed by: PSYCHIATRY & NEUROLOGY

## 2025-07-11 NOTE — Clinical Note
"Stephon Duffy (Greg)oper was seen and treated in our emergency department on 7/11/2025.  He may return to work on 07/14/2025.       If you have any questions or concerns, please don't hesitate to call.      Clayton Trotter MD"

## 2025-07-11 NOTE — TELEMEDICINE CONSULT
[facility]  Vascular Neurology  Comprehensive Stroke Center  TeleVascular Neurology Acute Consultation Note        Consult Information  Consults    Consulting Provider: JENNIFER PLUMMER JR   Current Providers  No providers found    Patient Location:  [facility] EMERGENCY DEPARTMENT Emergency Department    Spoke hospital nurse at bedside with patient assisting consultant.  Patient information was obtained from patient, past medical records, and primary team.       Stroke Documentation  Acute Stroke Times   Last Known Normal Date: 07/11/25  Last Known Normal Time: 0800  Symptom Onset Date: 07/11/25  Symptom Onset Time: 0900  Stroke Team Called Date: 07/11/25  Stroke Team Called Time: 1548  Stroke Team Arrival Date: 07/11/25  Stroke Team Arrival Time: 1554  CT Interpretation Time: 1554  Thrombolytic Therapy Recommended: No  CTA Interpretation Time: 1554  Thrombectomy Recommended: No    NIH Scale:  Interval: baseline  1a. Level of Consciousness: 0-->Alert, keenly responsive  1b. LOC Questions: 0-->Answers both questions correctly  1c. LOC Commands: 0-->Performs both tasks correctly  2. Best Gaze: 0-->Normal  3. Visual: 0-->No visual loss  4. Facial Palsy: 0-->Normal symmetrical movements  5a. Motor Arm, Left: 0-->No drift, limb holds 90 (or 45) degrees for full 10 secs  5b. Motor Arm, Right: 0-->No drift, limb holds 90 (or 45) degrees for full 10 secs  6a. Motor Leg, Left: 0-->No drift, leg holds 30 degree position for full 5 secs  6b. Motor Leg, Right: 0-->No drift, leg holds 30 degree position for full 5 secs  7. Limb Ataxia: 0-->Absent  8. Sensory: 0-->Normal, no sensory loss  9. Best Language: 0-->No aphasia, normal  10. Dysarthria: 0-->Normal  11. Extinction and Inattention (formerly Neglect): 0-->No abnormality  Total (NIH Stroke Scale): 0      Modified Redwood Baseline: Score: 0  Modified Redwood Discharge:    Karen Coma Scale: 15   ABCD2 Score:    [id number] Score:    HAS -BLED Score:    ICH  Score:    Shelley & Velasquez Classification:      Blood pressure (!) 165/98, pulse 75, resp. rate 18, SpO2 99%.      In my opinion, this was a: Tier 2, VAN Stroke Assessment: Not Applicable     Medical Decision Making  HPI:  54 y.o. male with migraine, depression, ED, sent to the ED from clinic for evaluation of recurrent episodes of bilateral UE weakness-numbness--tingling.  Said that such episodes are getting more frequent lately.     Images personally reviewed and interpreted:  Study: Head CT and CTA Head & Neck  Study Interpretation: no acute abnormality. No LVO, high grade stenosis, or significant carotid disease.     Assessment and plan:  Recurrent episodes of transient neurological symptoms that do not follow a determined vascular territory.  Recommended further neuro work up with MRI brain and cervical spine as outpatient.    Lytics recommendation: Thrombolytic therapy not recommended due to Patient back to neurological baseline    Thrombectomy recommendation: No; No large vessel occlusion identified on imaging   Placement recommendation: discharge from ED               ROS  Physical Exam  Past Medical History:   Diagnosis Date    Abnormal echocardiogram 08/07/2019    Abnormal EKG 02/21/2024    Acromegaly     Congenital pectus excavatum     Family history of premature CAD 08/07/2019    Hypogonadism in male     Near syncope 08/07/2019    Other chest pain 08/07/2019    Sleep apnea      Past Surgical History:   Procedure Laterality Date    EYE SURGERY      HERNIA REPAIR      hydrocele      LEFT HEART CATHETERIZATION Left 9/26/2019    Procedure: CATHETERIZATION, HEART, LEFT/ptca;  Surgeon: Marilyn Seaman MD;  Location: [facility] CATH LAB;  Service: Cardiology;  Laterality: Left;  left radial approach     Family History   Problem Relation Name Age of Onset    Aortic aneurysm Mother      Hyperlipidemia Father      Pulmonary embolism Sister phillip     Pulmonary embolism Sister josiah     Heart attack Brother Papito         Diagnoses  Weakness    Danilo Flynn MD      Emergent/Acute neurological consultation requested by spoke provider due to critical concerns for possible cerebrovascular event that could result in permanent loss of neurologic/bodily function, severe disability or death of this patient.  Immediate/timely evaluation by a highly prepared expert is paramount for optimal outcomes  High risk for neurological deterioration if not properly diagnosed  High risk for neurological deterioration if not treated promplty/as soon as possible  Complex diagnostic evaluation may be required (advanced imaging)  High risk treatment options (thrombolytics and/or thrombectomy)    Patient care was coordinated with spoke provider, including but not limted to    Discussing likely diagnosis/etiology of symptoms  Making recommendations for further diagnostic studies  Making recommendations for intravenous thrombolytics or other advanced therapies  Making recommendations for disposition (admission/transfer for higher level of care)      Neurology consultation requested by spoke provider. Audiovisual encounter with the patient performed using a secure connection.  Results and impressions from the visit are documented on this note and were communicated to the consulting provider/team via direct communication. The note has been shared for addition to the patients electronic medical record.

## 2025-07-11 NOTE — PROGRESS NOTES
Subjective:      Patient ID: Stephon Lynne is a 54 y.o. male.    Chief Complaint: Extremity Weakness (Pt states he was at work, while trying to work, he then felt some tingling gotten weak and numb in his arms and hands. Pt states this has happened 3 or 4 times in the past couple of years.) and Migraine (Pt states for the past couple of days, he's experienced some headaches, )      HPI  Today for evaluation with a chief complaint of upper extremity numbness, tingling and weakness.  Patient reports he was working outside, using a sledgehammer to knock down post.  Went inside and during this time lost dexterity, feeling, and strength of bilateral hands.  Patient reports this episode lasted approximately 30 minutes.  During this time he was unable to unbutton it unzip his pants.  States that most of the symptoms have resolved; however, he is still having numbness in the left upper arm.  He also reports awakening with a headache for the past 3 days which is unusual for him.  Patient reports 3 prior similar episodes over the past few years, these have not been evaluated with advanced imaging.    Patient also reports tremor.  States that he has noticed this develop over the past year.  Not present every day.  He does report that he has noticed it today.    Currently in clinic his has no headache, but is still exp NT of his left hand     He denies any vision change today, slurred speech, confusion, ataxia apraxia.    Review of Systems   Neurological:  Positive for tremors, weakness, numbness, headaches and coordination difficulties. Negative for dizziness, syncope, facial asymmetry, speech difficulty and memory loss.       Medication List with Changes/Refills   Current Medications    ALBUTEROL (PROVENTIL/VENTOLIN HFA) 90 MCG/ACTUATION INHALER    Inhale 2 puffs into the lungs every 4 (four) hours as needed for Shortness of Breath. Rescue    BRIMONIDINE 0.2% (ALPHAGAN) 0.2 % DROP    Place one drop in both eyes twice  "daily    NALTREXONE (DEPADE) 50 MG TABLET    Take 1 tablet (50 mg total) by mouth once daily.    ONDANSETRON (ZOFRAN-ODT) 4 MG TBDL    Take 1 tablet (4 mg total) by mouth every 6 (six) hours as needed.    TESTOSTERONE (ANDROGEL) 20.25 MG/1.25 GRAM (1.62 %) GLPM    Place 2 Pump onto the skin As instructed (Apply a total of 2 pumps every morning to your shoulders).        Objective:     Vitals:    07/11/25 1421   BP: 120/62   Pulse: 79   Temp: 98.1 °F (36.7 °C)   TempSrc: Tympanic   SpO2: 96%   Weight: 112.5 kg (248 lb 0.3 oz)   Height: 6' 2" (1.88 m)        Physical Exam  Constitutional:       Appearance: Normal appearance. He is normal weight.   HENT:      Head: Normocephalic and atraumatic.      Nose: Nose normal.   Eyes:      General: No visual field deficit.     Conjunctiva/sclera: Conjunctivae normal.      Comments: Eyes tracking normal on exam    Cardiovascular:      Rate and Rhythm: Normal rate and regular rhythm.      Pulses: Normal pulses.      Heart sounds: Normal heart sounds. No murmur heard.     No friction rub. No gallop.   Pulmonary:      Effort: Pulmonary effort is normal. No respiratory distress.      Breath sounds: Normal breath sounds. No stridor. No wheezing, rhonchi or rales.   Musculoskeletal:      Right lower leg: No edema.      Left lower leg: No edema.      Comments: Moves all extremities well and with good control  Normal gait observed      Skin:     General: Skin is warm and dry.      Capillary Refill: Capillary refill takes less than 2 seconds.   Neurological:      Mental Status: He is alert and oriented to person, place, and time.      Cranial Nerves: No facial asymmetry.      Motor: Tremor present. No weakness, abnormal muscle tone or seizure activity.      Coordination: Finger-Nose-Finger Test abnormal.      Gait: Gait is intact.      Comments: Fine tremor of left upper extremity.  Patient has difficulty performing finger-to-nose test   Psychiatric:         Mood and Affect: Mood normal. "         Behavior: Behavior normal.         Thought Content: Thought content normal.         Judgment: Judgment normal.            Assessment & Plan:     1. Upper extremity weakness  See dictation  2. Left upper extremity numbness  See dictation  3. Tremor  See dictation    I am concerned about patient's event today.  He has had several preceding events that have not been managed acutely.  Today he still has some persisting numbness and definite tremor in clinic.  I spoke with him and his wife who is a nurse about my concerns.  I recommended they go to a local emergency room for further evaluation advanced imaging.  Both agree with plan.  I did offer other forms of transportation, declined at this time.  Offered follow-up with me or with PCP next week for possible further advanced imaging                  Delia Davies PA-C

## 2025-07-11 NOTE — SUBJECTIVE & OBJECTIVE
HPI:  54 y.o. male with migraine, depression, ED, sent to the ED from clinic for evaluation of recurrent episodes of bilateral UE weakness-numbness--tingling.  Said that such episodes are getting more frequent lately.     Images personally reviewed and interpreted:  Study: Head CT and CTA Head & Neck  Study Interpretation: no acute abnormality. No LVO, high grade stenosis, or significant carotid disease.     Assessment and plan:  Recurrent episodes of transient neurological symptoms that do not follow a determined vascular territory.  Recommended further neuro work up with MRI brain and cervical spine as outpatient.    Lytics recommendation: Thrombolytic therapy not recommended due to Patient back to neurological baseline    Thrombectomy recommendation: No; No large vessel occlusion identified on imaging   Placement recommendation: discharge from ED

## 2025-07-11 NOTE — ED PROVIDER NOTES
Group Topic: BH Check-in/Symptom Rating    Date: 7/12/2021  Start Time: 0900  End Time: 1000  Facilitators: Margarette Patel    Focus: check in   Number in attendance: 3    Method: Group  Attendance: Present  Participation: Active  Patient Response: Appropriate feedback, Attentive and Demonstrated insight  Mood: Anxious  Affect: Type: Anxious   Range: Full (normal)   Congruency: Congruent   Stability: Stable  Behavior/Socialization: Appropriate to group, Cooperative and Engaged  Thought Process: Tracking  Task Performance: Follows directions  Patient Evaluation: Independent - full participation       SCRIBE #1 NOTE: I, Benji Mirza, am scribing for, and in the presence of, Homer Roach Jr., MD. I have scribed the HPI, ROS, and PEx.    SCRIBE #2 NOTE: I, Higinio Green, am scribing for, and in the presence of,  Clayton Trotter MD. I have scribed the remaining portions of the note not scribed by Scribe #1.      History     Chief Complaint   Patient presents with    Numbness     Pt states he went to restroom and could not grasp his zippers and buttons. He states from fingertips to elbows he started feeling numbness and loss of strength bilaterally. Symptoms are mainly in first 3 fingers on both hands. Symptoms started around 8 am. Pt arrived in [facility] as the  .     Cerebrovascular Accident     Review of patient's allergies indicates:  No Known Allergies      History of Present Illness     HPI    7/11/2025, 3:40 PM  History obtained from the patient      History of Present Illness: Stephon Lynne is a 54 y.o. male patient with a PMHx of sleep apnea, acromegaly, chest pain, syncope, abnormal ECG, family hx of premature CAD, congenital pectus excavatum, and abnormal EKG who presents to the Emergency Department for evaluation of numbness and cerebrovascular accident which began 0800 today. Pt reports hammering mail box posts up outside. Pt states he went to restroom and could not grasp his zippers and buttons due to numbness and weakness from his fingertips to his elbows bilaterally. Symptoms are mainly in first 3 fingers on both hands. Pt also reports tremors at the time of the episode but subsided here in ED. Pt states that his family members have noticed a tremor and weakness in his hands over the last year. Symptoms are constant and moderate in severity. No mitigating or exacerbating factors reported. Patient denies any other sxs at this time. No prior Tx specified. No further complaints or concerns at this time.       Arrival mode: Personal Transportation    PCP: Fredi Landis MD        Past  Medical History:  Past Medical History:   Diagnosis Date    Abnormal echocardiogram 2019    Abnormal EKG 2024    Acromegaly     Congenital pectus excavatum     Family history of premature CAD 2019    Hypogonadism in male     Near syncope 2019    Other chest pain 2019    Sleep apnea        Past Surgical History:  Past Surgical History:   Procedure Laterality Date    EYE SURGERY      HERNIA REPAIR      hydrocele      LEFT HEART CATHETERIZATION Left 2019    Procedure: CATHETERIZATION, HEART, LEFT/ptca;  Surgeon: Marilyn Seaman MD;  Location[facility] CATH LAB;  Service: Cardiology;  Laterality: Left;  left radial approach         Family History:  Family History   Problem Relation Name Age of Onset    Aortic aneurysm Mother      Hyperlipidemia Father      Pulmonary embolism Sister phillip     Pulmonary embolism Sister josiah     Heart attack Brother Papito        Social History:  Social History     Tobacco Use    Smoking status: Former     Current packs/day: 0.00     Average packs/day: 1.5 packs/day for 12.0 years (18.0 ttl pk-yrs)     Types: Cigarettes     Start date: 1990     Quit date: 2002     Years since quittin.5     Passive exposure: Never    Smokeless tobacco: Never   Substance and Sexual Activity    Alcohol use: Not Currently     Alcohol/week: 0.0 standard drinks of alcohol    Drug use: No    Sexual activity: Not on file        Review of Systems     Review of Systems   Constitutional:  Negative for fever.   HENT:  Negative for sore throat.    Respiratory:  Negative for shortness of breath.    Cardiovascular:  Negative for chest pain.   Gastrointestinal:  Negative for nausea.   Genitourinary:  Negative for dysuria.   Musculoskeletal:  Negative for back pain.   Skin:  Negative for rash.   Neurological:  Positive for tremors, weakness and numbness.   Hematological:  Does not bruise/bleed easily.   All other systems reviewed and are negative.     Physical Exam     Initial  Vitals [07/11/25 1540]   BP Pulse Resp Temp SpO2   (!) 165/98 75 18 -- 99 %      MAP       --          Physical Exam  Nursing Notes and Vital Signs Reviewed.  Constitutional: Patient is in no acute distress. Well-developed and well-nourished.  Head: Atraumatic. Normocephalic.  Eyes:  EOM intact.  No scleral icterus.  ENT: Mucous membranes are moist.  Nares clear   Neck:  Full ROM. No JVD.  Cardiovascular: Regular rate. Regular rhythm No murmurs, rubs, or gallops. Distal pulses are 2+ and symmetric  Pulmonary/Chest: No respiratory distress. Clear to auscultation bilaterally. No wheezing or rales.  Equal chest wall rise bilaterally  Abdominal: Soft and non-distended.  There is no tenderness.  No rebound, guarding, or rigidity. Good bowel sounds.  Genitourinary: No CVA tenderness.  No suprapubic tenderness  Musculoskeletal: Moves all extremities. No obvious deformities.  5 x 5 strength in all extremities.   Skin: Warm and dry.  Neurological:  Alert, awake, and appropriate.  Normal speech.  No acute focal neurological deficits are appreciated.  Two through 12 intact bilaterally.  Psychiatric: Normal affect. Good eye contact. Appropriate in content.       ED Course   Procedures  ED Vital Signs:  Vitals:    07/11/25 1540   BP: (!) 165/98   Pulse: 75   Resp: 18   SpO2: 99%       Abnormal Lab Results:  Labs Reviewed   COMPREHENSIVE METABOLIC PANEL - Abnormal       Result Value    Sodium 137      Potassium 3.7      Chloride 107      CO2 24      Glucose 88      BUN 12      Creatinine 0.9      Calcium 9.1      Protein Total 8.0      Albumin 4.3      Bilirubin Total 2.3 (*)     ALP 53      AST 20      ALT 20      Anion Gap 6 (*)     eGFR >60     CBC WITH DIFFERENTIAL - Abnormal    WBC 6.51      RBC 5.25      HGB 15.4      HCT 45.9      MCV 87      MCH 29.3      MCHC 33.6      RDW 12.3      Platelet Count 193      MPV 8.9 (*)     Nucleated RBC 0      Neut % 60.0      Lymph % 31.0      Mono % 7.7      Eos % 0.6      Basophil %  0.5      Imm Grans % 0.2      Neut # 3.91      Lymph # 2.02      Mono # 0.50      Eos # 0.04      Baso # 0.03      Imm Grans # 0.01     PROTIME-INR - Normal    PT 11.4      INR 1.0     TSH - Normal    TSH 1.514     URINALYSIS, REFLEX TO URINE CULTURE - Normal    Color, UA Yellow      Appearance, UA Clear      pH, UA 7.0      Spec Grav UA 1.020      Protein, UA Negative      Glucose, UA Negative      Ketones, UA Negative      Bilirubin, UA Negative      Blood, UA Negative      Nitrites, UA Negative      Urobilinogen, UA Negative      Leukocyte Esterase, UA Negative     CBC W/ AUTO DIFFERENTIAL    Narrative:     The following orders were created for panel order CBC W/ AUTO DIFFERENTIAL.  Procedure                               Abnormality         Status                     ---------                               -----------         ------                     CBC with Differential[[id number]]       Abnormal            Final result                 Please view results for these tests on the individual orders.   LIPID PANEL   GREY TOP URINE HOLD   POCT GLUCOSE, HAND-HELD DEVICE   POCT GLUCOSE    POCT Glucose 87          All Lab Results:  Results for orders placed or performed during the hospital encounter of 07/11/25   ECG 12 lead    Collection Time: 07/11/25  3:34 PM   Result Value Ref Range    QRS Duration 112 ms    [facility] QTC Calculation 451 ms   POCT glucose    Collection Time: 07/11/25  3:37 PM   Result Value Ref Range    POCT Glucose 87 70 - 110 mg/dL   Protime-INR    Collection Time: 07/11/25  3:45 PM   Result Value Ref Range    PT 11.4 9.0 - 12.5 seconds    INR 1.0 0.8 - 1.2   CBC with Differential    Collection Time: 07/11/25  3:45 PM   Result Value Ref Range    WBC 6.51 3.90 - 12.70 K/uL    RBC 5.25 4.60 - 6.20 M/uL    HGB 15.4 14.0 - 18.0 gm/dL    HCT 45.9 40.0 - 54.0 %    MCV 87 82 - 98 fL    MCH 29.3 27.0 - 31.0 pg    MCHC 33.6 32.0 - 36.0 g/dL    RDW 12.3 11.5 - 14.5 %    Platelet Count 193 150 - 450 K/uL     MPV 8.9 (L) 9.2 - 12.9 fL    Nucleated RBC 0 <=0 /100 WBC    Neut % 60.0 38 - 73 %    Lymph % 31.0 18 - 48 %    Mono % 7.7 4 - 15 %    Eos % 0.6 <=8 %    Basophil % 0.5 <=1.9 %    Imm Grans % 0.2 0.0 - 0.5 %    Neut # 3.91 1.8 - 7.7 K/uL    Lymph # 2.02 1 - 4.8 K/uL    Mono # 0.50 0.3 - 1 K/uL    Eos # 0.04 <=0.5 K/uL    Baso # 0.03 <=0.2 K/uL    Imm Grans # 0.01 0.00 - 0.04 K/uL   Comprehensive metabolic panel    Collection Time: 07/11/25  3:56 PM   Result Value Ref Range    Sodium 137 136 - 145 mmol/L    Potassium 3.7 3.5 - 5.1 mmol/L    Chloride 107 95 - 110 mmol/L    CO2 24 23 - 29 mmol/L    Glucose 88 70 - 110 mg/dL    BUN 12 6 - 20 mg/dL    Creatinine 0.9 0.5 - 1.4 mg/dL    Calcium 9.1 8.7 - 10.5 mg/dL    Protein Total 8.0 6.0 - 8.4 gm/dL    Albumin 4.3 3.5 - 5.2 g/dL    Bilirubin Total 2.3 (H) 0.1 - 1.0 mg/dL    ALP 53 40 - 150 unit/L    AST 20 11 - 45 unit/L    ALT 20 10 - 44 unit/L    Anion Gap 6 (L) 8 - 16 mmol/L    eGFR >60 >60 mL/min/1.73/m2   TSH    Collection Time: 07/11/25  3:56 PM   Result Value Ref Range    TSH 1.514 0.400 - 4.000 uIU/mL   Urinalysis, Reflex to Urine Culture Urine, Clean Catch    Collection Time: 07/11/25  4:06 PM    Specimen: Urine   Result Value Ref Range    Color, UA Yellow Straw, Alison, Yellow, Light-Orange    Appearance, UA Clear Clear    pH, UA 7.0 5.0 - 8.0    Spec Grav UA 1.020 1.005 - 1.030    Protein, UA Negative Negative    Glucose, UA Negative Negative    Ketones, UA Negative Negative    Bilirubin, UA Negative Negative    Blood, UA Negative Negative    Nitrites, UA Negative Negative    Urobilinogen, UA Negative <2.0 EU/dL    Leukocyte Esterase, UA Negative Negative       Imaging Results:  Imaging Results              CT Cervical Spine Without Contrast (Final result)  Result time 07/11/25 16:04:22      Final result by Omega Laws MD (07/11/25 16:04:22)                   Impression:      No CT evidence of acute cervical spine fracture or subluxation.    Cervical  spine degenerative changes as discussed above.    All CT scans at this facility use dose modulation, iterative reconstruction, and/or weight based dosing when appropriate to reduce radiation dose to as low as reasonable achievable.      Electronically signed by: Omega Laws  Date:    07/11/2025  Time:    16:04               Narrative:    EXAMINATION:  CT CERVICAL SPINE WITHOUT CONTRAST    CLINICAL HISTORY:  Cervical radiculopathy, prior cervical surgery;    TECHNIQUE:  Low dose axial images, sagittal and coronal reformations were performed though the cervical spine.  Contrast was not administered.    COMPARISON:  None.    FINDINGS:  Skull Base and Craniocervical Junction (partially imaged): Normal.    Spinal Alignment: Straightening of the normal cervical lordosis.    Vertebrae: Cervical vertebral body heights are maintained.  No evidence of an acute displaced fracture.    Discs: Minor C3-C4 and C6-C7 disc height loss.    Degenerative findings: Multilevel small posterior disc osteophyte complexes with evidence of C3-C4 and C6-C7 mild spinal canal stenosis.  Neural foraminal stenosis appears most pronounced on the right at C3-C4.    Paraspinal muscles & soft tissues: Within normal limits.                                       CT Head Without Contrast (Final result)  Result time 07/11/25 15:54:38      Final result by Omega Laws MD (07/11/25 15:54:38)                   Impression:      No CT evidence of acute intracranial abnormality.    All CT scans at this facility use dose modulation, iterative reconstruction, and/or weight base dosing when appropriate to reduce radiation dose to as low as reasonably achievable.      Electronically signed by: Omega Laws  Date:    07/11/2025  Time:    15:54               Narrative:    EXAMINATION:  CT HEAD WITHOUT CONTRAST    CLINICAL HISTORY:  Neuro deficit, acute, stroke suspected;    TECHNIQUE:  Contiguous axial images were obtained from the skull base through the  vertex without intravenous contrast.    COMPARISON:  None    FINDINGS:  No intracranial hemorrhage. No mass effect or midline shift. Normal parenchymal attenuation. The ventricles and sulci are normal in size and configuration. The visualized paranasal sinuses and mastoid air cells are clear.  No concerning osseous findings.                                       The EKG was ordered, reviewed, and independently interpreted by the ED provider.  Interpretation time: 03:34  Rate: 72 BPM  Rhythm: Sinus rhythm with 1st degree AV block  Interpretation: Possible left atrial enlargement. Left anterior fascicular block. No STEMI.         The Emergency Provider reviewed the vital signs and test results, which are outlined above.     ED Discussion     4:00 PM: Dr. Roach transfers care of patient to Dr. Trotter pending lab results.    4:15 PM: Discussed pt's case with Danilo Flynn MD (Neurology) who appreciates a NIHSS of 0, similar episodes in the past w/ same pattern, and a low index of stroke suspicion. Recommends further neuro workup w/ MRI brain/neck which can be done outpatient.    5:26 PM: Reassessed pt at this time. Discussed with patient and/or family/caretaker all pertinent ED information and results. Discussed pt dx and plan of tx. Gave the patient all f/u and return to the ED instructions. All questions and concerns were addressed at this time. Patient and/or family/caretaker expresses understanding of information and instructions, and is comfortable with plan to discharge. Pt is stable for discharge.     I discussed with patient and/or family/caretaker that evaluation in the ED does not suggest any emergent or life threatening medical conditions requiring immediate intervention beyond what was provided in the ED, and I believe patient is safe for discharge. Regardless, an unremarkable evaluation in the ED does not preclude the development or presence of a serious or life threatening condition. As such, I  instructed that the patient is to return immediately for any worsening or change in current symptoms.       Medical Decision Making  Amount and/or Complexity of Data Reviewed  Labs: ordered. Decision-making details documented in ED Course.  Radiology: ordered. Decision-making details documented in ED Course.  ECG/medicine tests: ordered and independent interpretation performed. Decision-making details documented in ED Course.                ED Medication(s):  Medications - No data to display    New Prescriptions    No medications on file        Follow-up Information       [facility] Fl. Call in 1 day.    Specialty: Neurology  Why: For re-evaluation and further treatment  Contact information:  [address] [facility] [address] [phone]  Additional information:  Please park on the [address] side and use the Clinic entrance. Check in on the 4th floor, right side registration             [facility] - Emergency Dept.. Go today.    Specialty: Emergency Medicine  Why: If symptoms worsen, For re-evaluation and further treatment, As needed  Contact information:  [address]  [facility] [address] [phone]                               Scribe Attestation:   Scribe #1: I performed the above scribed service and the documentation accurately describes the services I performed. I attest to the accuracy of the note.     Attending:   Physician Attestation Statement for Scribe #1: I, Homer Roach Jr., MD, personally performed the services described in this documentation, as scribed by Benji Mirza, in my presence, and it is both accurate and complete.       Scribe Attestation:   Scribe #2: I performed the above scribed service and the documentation accurately describes the services I performed. I attest to the accuracy of the note.    Attending Attestation:           Physician Attestation for Scribe:    Physician Attestation Statement for Scribe #2: Sergo CABRERA  MD Clayton, reviewed documentation, as scribed by Higinio Green in my presence, and it is both accurate and complete. I also acknowledge and confirm the content of the note done by Scribe #1.           Clinical Impression       ICD-10-CM ICD-9-CM   1. Bilateral hand numbness  R20.0 782.0   2. Acute focal neurological deficit  R29.818 781.99   3. Tremor of both hands  R25.1 781.0   4. Degenerative spinal arthritis  M47.819 721.90       Disposition:   Disposition: Discharged  Condition: Stable       Clayton Trotter MD  07/12/25 0234

## 2025-07-12 LAB — HOLD SPECIMEN: NORMAL

## 2025-07-13 LAB
OHS QRS DURATION: 112 MS
OHS QTC CALCULATION: 451 MS

## 2025-07-15 ENCOUNTER — TELEPHONE (OUTPATIENT)
Facility: CLINIC | Age: 55
End: 2025-07-15
Payer: COMMERCIAL

## 2025-07-15 NOTE — TELEPHONE ENCOUNTER
Copied from CRM #[id number]. Topic: Appointments - Amb Referral  >> Jul 15, 2025  9:09 AM Donna wrote:  Type: Patient Call Back    Who called: Patient     What is the request in detail: Pt is requesting a call back to discuss having an order placed for an MRI prior to visit. Please advise       Would the patient rather a call back or a response via [facility]?  Call     Best call back number: [phone]     Additional Information:

## 2025-07-15 NOTE — TELEPHONE ENCOUNTER
Spoke with patient. He advised that he did a telestroke appt with neurology. They recommended he have a MRI done and see a neurologist. He wanted to see if that is something he needed to have done prior to his visit.     I informed him that the provider will review all previous imaging with him at his visit and order the next necessary imaging if he feels he needs it. He verbalized understanding.

## 2025-07-18 ENCOUNTER — OFFICE VISIT (OUTPATIENT)
Dept: NEUROLOGY | Facility: CLINIC | Age: 55
End: 2025-07-18
Payer: COMMERCIAL

## 2025-07-18 VITALS
HEIGHT: 74 IN | WEIGHT: 248 LBS | SYSTOLIC BLOOD PRESSURE: 120 MMHG | BODY MASS INDEX: 31.83 KG/M2 | DIASTOLIC BLOOD PRESSURE: 79 MMHG | HEART RATE: 64 BPM

## 2025-07-18 DIAGNOSIS — R29.818 TRANSIENT NEUROLOGICAL SYMPTOMS: ICD-10-CM

## 2025-07-18 DIAGNOSIS — R56.9 SEIZURE IN CHILDHOOD: ICD-10-CM

## 2025-07-18 DIAGNOSIS — G56.00 CARPAL TUNNEL SYNDROME, UNSPECIFIED LATERALITY: Primary | ICD-10-CM

## 2025-07-18 PROCEDURE — 99215 OFFICE O/P EST HI 40 MIN: CPT | Mod: S$GLB,,, | Performed by: PSYCHIATRY & NEUROLOGY

## 2025-07-18 PROCEDURE — 3074F SYST BP LT 130 MM HG: CPT | Mod: CPTII,S$GLB,, | Performed by: PSYCHIATRY & NEUROLOGY

## 2025-07-18 PROCEDURE — 3078F DIAST BP <80 MM HG: CPT | Mod: CPTII,S$GLB,, | Performed by: PSYCHIATRY & NEUROLOGY

## 2025-07-18 PROCEDURE — 99999 PR PBB SHADOW E&M-EST. PATIENT-LVL IV: CPT | Mod: PBBFAC,,, | Performed by: PSYCHIATRY & NEUROLOGY

## 2025-07-18 PROCEDURE — 3008F BODY MASS INDEX DOCD: CPT | Mod: CPTII,S$GLB,, | Performed by: PSYCHIATRY & NEUROLOGY

## 2025-07-18 PROCEDURE — 1159F MED LIST DOCD IN RCRD: CPT | Mod: CPTII,S$GLB,, | Performed by: PSYCHIATRY & NEUROLOGY

## 2025-07-18 PROCEDURE — 1160F RVW MEDS BY RX/DR IN RCRD: CPT | Mod: CPTII,S$GLB,, | Performed by: PSYCHIATRY & NEUROLOGY

## 2025-07-18 NOTE — PROGRESS NOTES
"Subjective:      Patient ID: Stephon Lynne is a 54 y.o. male.    Chief Complaint:  " spell ".     History of Present Illness: 54 year old C. Male with PMHx of Acromegaly / YURI / Near Syncope and other Medical issues  Came for the evaluation of transient sensory symptoms.     Mr. Lynne presents today for evaluation of a neurological episode that occurred last Friday.    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   No face / speech and low extremities involvements.   Subsided by shaking hands and open and closing his hangs / fingers.   He reports 3-4 similar but less severe episodes of numbness over the past couple years,   typically associated with prolonged physical activities such as weed eating ( whacking ) and using a chainsaw.   He initially attributed these symptoms to possible carpal tunnel syndrome. He maintains good hydration during physical activities.    NEUROLOGICAL SYMPTOMS:  He reports experiencing mild, intermittent tremors over the past couple years, most noticeable when holding utensils while eating.   Family members have noted these random but consistent tremors. No associated pain or functional impairment reported/ balance issues / falls / shuffling.    MEDICAL HISTORY:  History of three grand mal seizures at age 9 following head injury from falling against gym floor.   Seizures were treated with Dilantin for two years with no recurrence. In 2004.     He experienced a cluster headache episode with day-long intense pain, nausea, and altered awareness.   MRI at that time showed a " void of tissue " adjacent to the Pituitary.   He has borderline Acromegaly diagnosed by Dr. Krause with history of elevated GH1 levels, which continues to be monitored.    FAMILY HISTORY:  Son has " Migraines. No family history of seizures.     Review of Systems   Neurological:  Positive for numbness.   All other systems reviewed and are negative.    Objective:     Neurological Exam  Mental Status  Alert. Oriented to person, place, time and situation. Recent and remote memory are intact. Able to copy figure. Clock drawing is normal. Speech is normal. Language is fluent with no aphasia. Attention and concentration are normal. Fund of knowledge is appropriate for level of education. Apraxia absent.    Cranial Nerves  CN I: Sense of smell is normal.  CN II: Visual acuity is normal. Visual fields full to confrontation.  CN III, IV, VI: Extraocular movements intact bilaterally. Normal lids and orbits bilaterally. Pupils equal round and reactive to light bilaterally.  CN V: Facial sensation is normal.  CN VII: Full and symmetric facial movement.  CN VIII: Hearing is normal.  CN IX, X: Palate elevates symmetrically. Normal gag reflex.  CN XI: Shoulder shrug strength is normal.  CN XII: Tongue midline without atrophy or fasciculations.    Motor  Normal muscle bulk throughout. No fasciculations present. Normal muscle tone. No abnormal involuntary movements. Strength is 5/5 throughout all four extremities.    Sensory  Sensation is intact to light touch, pinprick, vibration and proprioception in all four extremities. No right-sided hemispatial neglect. No left-sided hemispatial neglect. Right agraphesthesia absent. Left agraphesthesia absent. Right astereognosis absent. Left astereognosis absent.    Reflexes                                            Right                      Left  Brachioradialis                    2+                         2+  Biceps                                 2+                         2+  Triceps                                2+                         2+  Finger flex                           2+                         2+  Hamstring                            2+                          2+  Patellar                                2+                         2+  Achilles                                2+                         2+  Right Plantar: downgoing  Left Plantar: downgoing  Jaw jerk absent.  Right pathological reflexes: Mino's absent. Ankle clonus absent.  Left pathological reflexes: Mino's absent. Ankle clonus absent.  Glabellar tap absent. Snout absent. Right palmomental absent. Left palmomental absent. Right palmar grasp absent. Left palmar grasp absent.    Coordination    Finger-to-nose, rapid alternating movements and heel-to-shin normal bilaterally without dysmetria.    Gait  Normal casual, toe, heel and tandem gait.      Physical Exam  Constitutional:       Appearance: Normal appearance. He is normal weight.   HENT:      Head: Normocephalic and atraumatic.      Right Ear: Tympanic membrane normal.      Left Ear: Tympanic membrane normal.      Nose: Nose normal.      Mouth/Throat:      Mouth: Mucous membranes are moist.      Pharynx: Oropharynx is clear.   Eyes:      General: Lids are normal.      Extraocular Movements: Extraocular movements intact.      Conjunctiva/sclera: Conjunctivae normal.      Pupils: Pupils are equal, round, and reactive to light.   Cardiovascular:      Rate and Rhythm: Normal rate and regular rhythm.      Pulses: Normal pulses.      Heart sounds: Normal heart sounds.   Pulmonary:      Effort: Pulmonary effort is normal.      Breath sounds: Normal breath sounds.   Abdominal:      General: Abdomen is flat. Bowel sounds are normal.      Palpations: Abdomen is soft.   Genitourinary:     Comments: Deferred.   Musculoskeletal:         General: Deformity present.      Cervical back: Normal range of motion and neck supple.      Comments: Increase face bones anatomy.    Skin:     General: Skin is warm and dry.      Capillary Refill: Capillary refill takes less than 2 seconds.   Neurological:      Mental Status: He is alert. Mental status is at baseline.       "Motor: Motor strength is normal.     Coordination: Coordination is intact.      Deep Tendon Reflexes:      Reflex Scores:       Tricep reflexes are 2+ on the right side and 2+ on the left side.       Bicep reflexes are 2+ on the right side and 2+ on the left side.       Brachioradialis reflexes are 2+ on the right side and 2+ on the left side.       Patellar reflexes are 2+ on the right side and 2+ on the left side.       Achilles reflexes are 2+ on the right side and 2+ on the left side.  Psychiatric:         Mood and Affect: Mood normal.         Speech: Speech normal.         Behavior: Behavior normal.         Thought Content: Thought content normal.         Judgment: Judgment normal.        Assessment/Plan:   54 years old C. Male with PMHx as above came with evaluation of intermittent " transient Neurological symptoms ".     R56.9 Seizure in childhood  R29.818 Transient neurological symptoms  G56.00 Carpal tunnel syndrome, unspecified laterality    PLAN:   Patient Neurological Assessment is remarkable with signs of Acromegaly / postural mild Tremors.     Headaches / Migraines no an issue according to Patient / very " occasional " / responding well to NSAID's PRN / will monitor.    Tremors intermittent, seems Essential in nature / no evidence of Parkinsonism  - occasionally and random / no interfere with ADL's / work /   social life and so on - no treatment need it at this moment. Will continue to monitor.     PLAN SUMMARY:    - Ordered EEG to investigate seizure-like symptoms  - Ordered MRI Brain to evaluate previously noted "void" near pituitary  - Ordered nerve conduction study of hands to assess for ulnar nerve compression and carpal tunnel syndrome  - Contact office to schedule EEG and MRI tests  - Follow-up after completing ordered tests (EEG, MRI, nerve conduction study)    SEIZURE IN CHILDHOOD:  - Considered history of seizures, though presentation atypical for seizure activity (both hands affected without " "LOC).  - Ordered EEG.  - Follow up after completing ordered tests (EEG).  - Contact the office when available to schedule these tests.    TRANSIENT NEUROLOGICAL SYMPTOMS:  - Considered transient neurological symptoms, though presentation atypical for seizure activity (both hands affected without LOC).  - Ruling out stroke or other acute neurological events, though deemed unlikely based on presentation.  - Investigating previously noted "void" near pituitary on past MRI to rule out any central nervous system involvement.  - Ordered MRI Brain.  - Follow up after completing ordered tests (MRI, EEG).  - Contact the office when available to schedule these tests.    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  - Evaluated for ulnar nerve compression as possible cause of elbow discomfort and numbness.  - Ordered nerve conduction study of hands.  - Follow up after completing ordered tests (nerve conduction study).  - Contact the office when available to schedule these tests.     Labs: CBC / CMP / TSH - 2025 - non significant abnormalities.     Hep C / HIV / Hgb A 1 C - negative.     Personally reviewed CT Scan Head - 07/ 2025 - no acute changes.     Personally reviewed CT Scan Cervical spine - 07/ 2025 - no acute changes.     This note was generated with the assistance of ambient listening technology. Verbal consent was obtained by the patient and accompanying visitor(s)   for the recording of patient appointment to facilitate this note. I attest to having reviewed and edited the generated note for accuracy,   though some syntax or spelling errors may persist.   Please contact the author of this note for any clarification.     The patient to adhere to the age-appropriate health maintenance guidelines including screening tests and vaccinations.   The patient to adhere to  healthy lifestyle, optimal weight, exercise, " healthy diet,   good sleep hygiene and avoiding drugs including smoking, alcohol and recreational drugs.    RTC: 6 weeks.      Please do not hesitate to contact me with any updates, questions or concerns.    I spent a total of 45 minutes on the day of the visit.This includes face to face time and non-face to face time preparing to see the patient   (eg, review of tests), obtaining and/or reviewing separately obtained history, documenting clinical information in the electronic or other health record,   independently interpreting results and communicating results to the patient/family/caregiver, or care coordinator.    Jamaal Hurt MD  General Neurology.

## 2025-07-21 ENCOUNTER — PROCEDURE VISIT (OUTPATIENT)
Dept: NEUROLOGY | Facility: CLINIC | Age: 55
End: 2025-07-21
Payer: COMMERCIAL

## 2025-07-21 DIAGNOSIS — G56.00 CARPAL TUNNEL SYNDROME, UNSPECIFIED LATERALITY: ICD-10-CM

## 2025-07-21 PROCEDURE — 95911 NRV CNDJ TEST 9-10 STUDIES: CPT | Mod: S$GLB,,, | Performed by: PSYCHIATRY & NEUROLOGY

## 2025-07-23 ENCOUNTER — OFFICE VISIT (OUTPATIENT)
Dept: CARDIOLOGY | Facility: CLINIC | Age: 55
End: 2025-07-23
Payer: COMMERCIAL

## 2025-07-23 DIAGNOSIS — R93.1 ABNORMAL ECHOCARDIOGRAM: ICD-10-CM

## 2025-07-23 DIAGNOSIS — R94.31 ABNORMAL EKG: Primary | ICD-10-CM

## 2025-07-23 DIAGNOSIS — Q67.6 CONGENITAL PECTUS EXCAVATUM: ICD-10-CM

## 2025-07-23 DIAGNOSIS — G47.33 OSA ON CPAP: Chronic | ICD-10-CM

## 2025-07-23 DIAGNOSIS — Z82.49 FAMILY HISTORY OF PREMATURE CAD: ICD-10-CM

## 2025-07-23 DIAGNOSIS — R55 NEAR SYNCOPE: ICD-10-CM

## 2025-07-23 DIAGNOSIS — R06.02 SOB (SHORTNESS OF BREATH) ON EXERTION: ICD-10-CM

## 2025-07-23 DIAGNOSIS — R94.39 ABNORMAL STRESS TEST: ICD-10-CM

## 2025-07-23 DIAGNOSIS — E66.811 CLASS 1 OBESITY WITHOUT SERIOUS COMORBIDITY WITH BODY MASS INDEX (BMI) OF 30.0 TO 30.9 IN ADULT, UNSPECIFIED OBESITY TYPE: ICD-10-CM

## 2025-07-23 PROCEDURE — 1159F MED LIST DOCD IN RCRD: CPT | Mod: CPTII,95,, | Performed by: INTERNAL MEDICINE

## 2025-07-23 PROCEDURE — 98004 SYNCH AUDIO-VIDEO EST SF 10: CPT | Mod: 95,,, | Performed by: INTERNAL MEDICINE

## 2025-07-23 PROCEDURE — 1160F RVW MEDS BY RX/DR IN RCRD: CPT | Mod: CPTII,95,, | Performed by: INTERNAL MEDICINE

## 2025-07-23 NOTE — PROGRESS NOTES
Subjective:   Patient ID:  Stephon Lynne is a 54 y.o. male who presents for follow up of No chief complaint on file.    The patient location is: home  The chief complaint leading to consultation is: abnormal ekg    Visit type: audiovisual    Face to Face time with patient: 11 minutes  14 minutes of total time spent on the encounter, which includes face to face time and non-face to face time preparing to see the patient (eg, review of tests), Obtaining and/or reviewing separately obtained history, Documenting clinical information in the electronic or other health record, Independently interpreting results (not separately reported) and communicating results to the patient/family/caregiver, or Care coordination (not separately reported).         Each patient to whom he or she provides medical services by telemedicine is:  (1) informed of the relationship between the physician and patient and the respective role of any other health care provider with respect to management of the patient; and (2) notified that he or she may decline to receive medical services by telemedicine and may withdraw from such care at any time.    Notes:    HPI  2/21/2024  A 54 yo male with h/o acromegaly fh cad premature  heart cath in 2019 for shortness of breath with normal coronaries due to continued symptoms. Who is here today because of recurrent exertional shortness of breath `over the past 3 months . He has noticed exertional symptoms with carrying garbage has pulmonary eval during 6 minute walk test had symptoms of chest tightness and shortness of breath w/o any change in o2 sat .he stopped symptoms resolved. He has been using his cpap machine less than before.   His EKG showed a wider qrs. He has a first degree av block    Er visit 7/`11/2025  istory of Present Illness: Stephon Lynne is a 54 y.o. male patient with a PMHx of sleep apnea, acromegaly, chest pain, syncope, abnormal ECG, family hx of premature CAD, congenital  pectus excavatum, and abnormal EKG who presents to the Emergency Department for evaluation of numbness and cerebrovascular accident which began 0800 today. Pt reports hammering mail box posts up outside. Pt states he went to restroom and could not grasp his zippers and buttons due to numbness and weakness from his fingertips to his elbows bilaterally. Symptoms are mainly in first 3 fingers on both hands. Pt also reports tremors at the time of the episode but subsided here in ED. Pt states that his family members have noticed a tremor and weakness in his hands over the last year. Symptoms are constant and moderate in severity. No mitigating or exacerbating factors reported. Patient denies any other sxs at this time. No prior Tx specified. No further complaints or concerns at this time.     7/23/2025   History of Present Illness             Here for f/u after er visit concerned about abnormal EKG his numbness was evaluated with neurology had emg nerve conduction.  He is concerned aBOUT EKG HE IS Asymptomatic cardiovascular wise he is very active,  His ef low normal cath unrevealing cardiolite normal. No concerns cardiac wise.  His EKG is abnormal since 2002 at least he has left axis deviation and lahb unchanged.  Review of Systems   Constitutional: Negative for malaise/fatigue.   Eyes:  Negative for blurred vision.   Cardiovascular:  Negative for chest pain, claudication, cyanosis, dyspnea on exertion, irregular heartbeat, leg swelling, near-syncope, orthopnea, palpitations and paroxysmal nocturnal dyspnea.   Respiratory:  Negative for cough, hemoptysis and shortness of breath.    Hematologic/Lymphatic: Negative for bleeding problem. Does not bruise/bleed easily.   Skin:  Negative for dry skin and itching.   Musculoskeletal:  Negative for falls, muscle weakness and myalgias.   Gastrointestinal:  Negative for abdominal pain, diarrhea, heartburn, hematemesis, hematochezia and melena.   Genitourinary:  Negative for flank  pain and hematuria.   Neurological:  Negative for dizziness, focal weakness, headaches, light-headedness, numbness, paresthesias, seizures and weakness.   Psychiatric/Behavioral:  Negative for altered mental status and memory loss. The patient is not nervous/anxious.    Allergic/Immunologic: Negative for hives.       Past Medical History:   Diagnosis Date    Abnormal echocardiogram 08/07/2019    Abnormal EKG 02/21/2024    Acromegaly     Congenital pectus excavatum     Family history of premature CAD 08/07/2019    Hypogonadism in male     Near syncope 08/07/2019    Other chest pain 08/07/2019    Sleep apnea        Past Surgical History:   Procedure Laterality Date    EYE SURGERY      HERNIA REPAIR      hydrocele      LEFT HEART CATHETERIZATION Left 9/26/2019    Procedure: CATHETERIZATION, HEART, LEFT/ptca;  Surgeon: Marilyn Seaman MD;  Location: [facility] CATH LAB;  Service: Cardiology;  Laterality: Left;  left radial approach       Social History[1]    Family History   Problem Relation Name Age of Onset    Aortic aneurysm Mother      Hyperlipidemia Father      Pulmonary embolism Sister phillip     Pulmonary embolism Sister josiah     Heart attack Brother Papito        Current Outpatient Medications   Medication Sig    albuterol (PROVENTIL/VENTOLIN HFA) 90 mcg/actuation inhaler Inhale 2 puffs into the lungs every 4 (four) hours as needed for Shortness of Breath. Rescue    brimonidine 0.2% (ALPHAGAN) 0.2 % Drop Place one drop in both eyes twice daily    naltrexone (DEPADE) 50 mg tablet Take 1 tablet (50 mg total) by mouth once daily.    ondansetron (ZOFRAN-ODT) 4 MG TbDL Take 1 tablet (4 mg total) by mouth every 6 (six) hours as needed.    testosterone (ANDROGEL) 20.25 mg/1.25 gram (1.62 %) GlPm Place 2 Pump onto the skin As instructed (Apply a total of 2 pumps every morning to your shoulders).     No current facility-administered medications for this visit.     Current Outpatient Medications on File Prior to Visit    Medication Sig    albuterol (PROVENTIL/VENTOLIN HFA) 90 mcg/actuation inhaler Inhale 2 puffs into the lungs every 4 (four) hours as needed for Shortness of Breath. Rescue    brimonidine 0.2% (ALPHAGAN) 0.2 % Drop Place one drop in both eyes twice daily    naltrexone (DEPADE) 50 mg tablet Take 1 tablet (50 mg total) by mouth once daily.    ondansetron (ZOFRAN-ODT) 4 MG TbDL Take 1 tablet (4 mg total) by mouth every 6 (six) hours as needed.    testosterone (ANDROGEL) 20.25 mg/1.25 gram (1.62 %) GlPm Place 2 Pump onto the skin As instructed (Apply a total of 2 pumps every morning to your shoulders).     No current facility-administered medications on file prior to visit.     Review of patient's allergies indicates:  No Known Allergies         Objective:   There were no vitals filed for this visit.  There is no height or weight on file to calculate BMI.  Physical Exam             Physical Exam  Constitutional:       Appearance: Normal appearance.   HENT:      Head: Normocephalic and atraumatic.   Pulmonary:      Effort: Pulmonary effort is normal.   Neurological:      Mental Status: He is alert and oriented to person, place, and time.   Psychiatric:         Mood and Affect: Mood normal.       Lab Results   Component Value Date    CHOL 148 07/11/2025    CHOL 144 02/22/2024    CHOL 139 05/09/2017     Lab Results   Component Value Date    HGBA1C 5.2 09/09/2024      BMP  Lab Results   Component Value Date     07/11/2025    K 3.7 07/11/2025     07/11/2025    CO2 24 07/11/2025    BUN 12 07/11/2025    CREATININE 0.9 07/11/2025    CALCIUM 9.1 07/11/2025    ANIONGAP 6 (L) 07/11/2025    EGFRNORACEVR >60 07/11/2025      Lab Results   Component Value Date    HDL 36 (L) 07/11/2025    HDL 38 (L) 02/22/2024    HDL 43 05/09/2017     Lab Results   Component Value Date    LDLCALC 91.6 07/11/2025    LDLCALC 89.8 02/22/2024     Lab Results   Component Value Date    TRIG 102 07/11/2025    TRIG 81 02/22/2024    TRIG 79  05/09/2017     Lab Results   Component Value Date    CHOLHDL 24.3 07/11/2025    CHOLHDL 26.4 02/22/2024    CHOLHDL 30.9 05/09/2017       Chemistry        Component Value Date/Time     07/11/2025 1556     09/09/2024 0820    K 3.7 07/11/2025 1556    K 4.1 09/09/2024 0820     07/11/2025 1556     09/09/2024 0820    CO2 24 07/11/2025 1556    CO2 27 09/09/2024 0820    BUN 12 07/11/2025 1556    CREATININE 0.9 07/11/2025 1556    GLU 88 07/11/2025 1556    GLU 93 09/09/2024 0820        Component Value Date/Time    CALCIUM 9.1 07/11/2025 1556    CALCIUM 9.5 09/09/2024 0820    ALKPHOS 53 07/11/2025 1556    ALKPHOS 54 (L) 09/09/2024 0820    AST 20 07/11/2025 1556    AST 18 09/09/2024 0820    ALT 20 07/11/2025 1556    ALT 16 09/09/2024 0820    BILITOT 2.3 (H) 07/11/2025 1556    BILITOT 0.8 09/09/2024 0820    ESTGFRAFRICA >60.0 09/20/2019 1715    EGFRNONAA >60.0 09/20/2019 1715          Lab Results   Component Value Date    TSH 1.514 07/11/2025     Lab Results   Component Value Date    INR 1.0 07/11/2025    INR 1.0 09/20/2019    PROTIME 11.4 07/11/2025     Lab Results   Component Value Date    WBC 6.51 07/11/2025    HGB 15.4 07/11/2025    HCT 45.9 07/11/2025    MCV 87 07/11/2025     07/11/2025     BMP  Sodium   Date Value Ref Range Status   07/11/2025 137 136 - 145 mmol/L Final   09/09/2024 139 136 - 145 mmol/L Final     Potassium   Date Value Ref Range Status   07/11/2025 3.7 3.5 - 5.1 mmol/L Final   09/09/2024 4.1 3.5 - 5.1 mmol/L Final     Chloride   Date Value Ref Range Status   07/11/2025 107 95 - 110 mmol/L Final   09/09/2024 106 95 - 110 mmol/L Final     CO2   Date Value Ref Range Status   07/11/2025 24 23 - 29 mmol/L Final   09/09/2024 27 23 - 29 mmol/L Final     BUN   Date Value Ref Range Status   07/11/2025 12 6 - 20 mg/dL Final     Creatinine   Date Value Ref Range Status   07/11/2025 0.9 0.5 - 1.4 mg/dL Final     Calcium   Date Value Ref Range Status   07/11/2025 9.1 8.7 - 10.5 mg/dL  Final   2024 9.5 8.7 - 10.5 mg/dL Final     Anion Gap   Date Value Ref Range Status   2025 6 (L) 8 - 16 mmol/L Final     eGFR if    Date Value Ref Range Status   2019 >60.0 >60 mL/min/1.73 m^2 Final     eGFR if non    Date Value Ref Range Status   2019 >60.0 >60 mL/min/1.73 m^2 Final     Comment:     Calculation used to obtain the estimated glomerular filtration  rate (eGFR) is the CKD-EPI equation.        CrCl cannot be calculated (Patient's most recent lab result is older than the maximum 7 days allowed.).    Assessment:     1. Abnormal EKG    2. YURI on CPAP    3. Near syncope    4. Abnormal echocardiogram    5. Family history of premature CAD    6. Class 1 obesity without serious comorbidity with body mass index (BMI) of 30.0 to 30.9 in adult, unspecified obesity type    7. Abnormal stress test    8. SOB (shortness of breath) on exertion    9. Congenital pectus excavatum    Reviewed er visit reviewed ekgs all the way back to  he has chronic lad due to his congenital pathology and chest wall deformity w/o any new EKG changes. He is asymptomatic cardiac wise. I reviewed his cath echo stress test. I reassured him that this is a stable chronic findings w/o any pathologic connotation or sequelae.would continue current active lifestyle and no restrictions.     Plan:         Reassure   F/u prn  Notes:                                [1]   Social History  Tobacco Use    Smoking status: Former     Current packs/day: 0.00     Average packs/day: 1.5 packs/day for 12.0 years (18.0 ttl pk-yrs)     Types: Cigarettes     Start date: 1990     Quit date: 2002     Years since quittin.5     Passive exposure: Never    Smokeless tobacco: Never   Substance Use Topics    Alcohol use: Not Currently     Alcohol/week: 0.0 standard drinks of alcohol    Drug use: No

## 2025-08-07 ENCOUNTER — HOSPITAL ENCOUNTER (OUTPATIENT)
Dept: RADIOLOGY | Facility: HOSPITAL | Age: 55
Discharge: HOME | End: 2025-08-07
Attending: PSYCHIATRY & NEUROLOGY
Payer: COMMERCIAL

## 2025-08-07 ENCOUNTER — HOSPITAL ENCOUNTER (OUTPATIENT)
Dept: PULMONOLOGY | Facility: HOSPITAL | Age: 55
Discharge: HOME | End: 2025-08-07
Attending: PSYCHIATRY & NEUROLOGY
Payer: COMMERCIAL

## 2025-08-07 DIAGNOSIS — R29.818 TRANSIENT NEUROLOGICAL SYMPTOMS: ICD-10-CM

## 2025-08-07 DIAGNOSIS — R56.9 SEIZURE IN CHILDHOOD: Primary | ICD-10-CM

## 2025-08-07 PROCEDURE — 70551 MRI BRAIN STEM W/O DYE: CPT | Mod: TC

## 2025-08-07 PROCEDURE — 70551 MRI BRAIN STEM W/O DYE: CPT | Mod: 26,,, | Performed by: STUDENT IN AN ORGANIZED HEALTH CARE EDUCATION/TRAINING PROGRAM

## 2025-08-07 RX ORDER — LEVETIRACETAM 500 MG/1
500 TABLET ORAL 2 TIMES DAILY
Qty: 60 TABLET | Refills: 3 | Status: SHIPPED | OUTPATIENT
Start: 2025-08-07 | End: 2025-12-05

## 2025-08-08 DIAGNOSIS — R56.9 SEIZURE IN CHILDHOOD: Primary | ICD-10-CM

## 2025-08-08 NOTE — PROCEDURES
[facility]   Ben Guevara  Department of Neurology  [address] [place] [address] ALYSSA Arambula  [id number]  Phone [phone]     Fax [phone]        Full Name: Stephon Lynne Gender: Male  Patient ID: [id number] YOB: 1970      Visit Date: 7/21/2025 4:02 PM  Age: 54 Years  Examining Physician: Wang Lugo M.D.  Referring Physician: Jamaal Hurt MD  Technician: JOSE CARLOS Shultz  History: CTS workup. Indication for study: Paresthesias from elbows to fingertips. PMHX: Acromegaly, congenital pectus excavatum, YURI, near syncope, headaches, intermittent tremors.    SUMMARY     Nerve conduction studies were performed in the right and left upper extremities. The right median motor study recording the abductor pollicis brevis showed a normal amplitude, normal distal latency, and normal conduction velocity. The right ulnar motor study recording the abductor digiti minimi showed a normal amplitude, normal distal latency, and normal conduction velocity. No conduction block or focal slowing was present across the elbow.       The right median sensory response recording digit two showed a normal amplitude, latency, and conduction velocity. The right ulnar sensory response recording digit five showed a normal amplitude, latency, and conduction velocity. The right radial sensory response recording over the extensor snuff box showed a normal amplitude, latency, and conduction velocity.     As routine median motor and sensory studies have a false negative rate of 25%, additional internal comparison studies were done to assess for possible median neuropathy at the wrist. These internal comparison studies (median vs. ulnar palmar mixed; median vs. ulnar sensory recording digit four; median vs. ulnar motor studies to the second lumbrical / interosseous; median vs. radial sensory studies recording digit one; median segmental sensory studies comparing the wrist-palm and palm-digit  velocities) increase the electrodiagnostic sensitivity rate to 95%. However, due to statistical issues with multiple tests, it is required that at least two studies are abnormal to reduce the false positive rate to acceptable levels. Right median-ulnar mixed palmar latencies showed a normal median latency compared to the ulnar.    The left median motor study recording the abductor pollicis brevis showed a normal amplitude, normal distal latency, and normal conduction velocity. The left ulnar motor study recording the abductor digiti minimi showed a normal amplitude, normal distal latency, and normal conduction velocity. No conduction block or focal slowing was present across the elbow.     The left median sensory response recording digit two showed a normal amplitude, latency, and conduction velocity. The left ulnar sensory response recording digit five showed a normal amplitude, latency, and conduction velocity. The left radial sensory response recording over the extensor snuff box showed a normal amplitude, latency, and conduction velocity.     As routine median motor and sensory studies have a false negative rate of 25%, additional internal comparison studies were done to assess for possible median neuropathy at the wrist. These internal comparison studies (median vs. ulnar palmar mixed; median vs. ulnar sensory recording digit four; median vs. ulnar motor studies to the second lumbrical / interosseous; median vs. radial sensory studies recording digit one; median segmental sensory studies comparing the wrist-palm and palm-digit velocities) increase the electrodiagnostic sensitivity rate to 95%. However, due to statistical issues with multiple tests, it is required that at least two studies are abnormal to reduce the false positive rate to acceptable levels. Left median-ulnar mixed palmar latencies showed a normal median latency compared to the ulnar.       IMPRESSION     This is a normal study.    There is no  electrophysiologic evidence of median mononeuropathy across the wrist (carpal tunnel syndrome) on either side.    ---------------------------------             Wang Lugo M.D., F.A.A.N.      Diplomate, American Board of Psychiatry and Neurology  Diplomate, American Board of Clinical Neurophysiology  Fellow, American Academy of Neurology             SENSORY NCS      Nerve / Sites Rec. Site Peak NP Amp PP Amp Dist Teofilo d Lat.2     ms µV µV cm m/s ms   L Median - Dig II      Wrist II 3.35 8.1 8.3 13 51.6 3.35      Ref.  3.60  15.0  48.0    L Median - Ulnar - Palmar      Median Wrist 2.27 14.4 12.4 8 50.5 2.27      Ulnar Wrist 2.17 3.2 11.8 8 1920.0 -0.10   L Ulnar - Dig V      Wrist Dig V 2.15 5.9 9.4 11 74.4 2.15      Ref.  3.10  10.0  48.0    L Radial - Snuff box      Forearm Snuff box 2.50 20.2 23.1 10 57.1 2.50      Ref.  2.70  14.0      R Median - Dig II      Wrist II 3.12 10.5 11.1 13 53.3 3.12      Ref.  3.60  15.0  48.0    R Median - Ulnar - Palmar      Median Wrist 2.06 11.5 14.2 8 55.7 2.06      Ulnar Wrist 2.06 14.3 3.4 8 960.0 0.00   R Ulnar - Dig V      Wrist Dig V 2.62 8.6 6.9 11 69.5 2.62      Ref.  3.10  10.0  48.0        MOTOR NCS      Nerve / Sites Rec. Site Lat Amp Dist Teofilo     ms mV cm m/s   L Median - APB      Wrist APB 3.85 7.9 8       Ref.  4.00 6.0        Elbow APB 8.25 6.6 23.5 53.5      Ref.     48.0   L Ulnar - ADM      Wrist ADM 2.71 10.3 8       Ref.  3.10 7.0        B. Elbow ADM 7.46 10.0 25 52.6      Ref.     50.0      A. Elbow ADM 9.52 9.6 11 53.3   R Median - APB      Wrist APB 3.85 7.7 8       Ref.  4.00 6.0        Elbow APB 8.60 7.2 23 48.4      Ref.     48.0                             ---------------------------------             Wang Lugo M.D., F.A.A.N.      Diplomate, American Board of Psychiatry and Neurology  Diplomate, American Board of Clinical Neurophysiology  Fellow, American Academy of Neurology

## 2025-08-11 ENCOUNTER — LAB VISIT (OUTPATIENT)
Dept: LAB | Facility: HOSPITAL | Age: 55
End: 2025-08-11
Attending: UROLOGY
Payer: COMMERCIAL

## 2025-08-11 DIAGNOSIS — E29.1 MALE HYPOGONADISM: ICD-10-CM

## 2025-08-11 DIAGNOSIS — Z12.5 PROSTATE CANCER SCREENING: ICD-10-CM

## 2025-08-11 PROBLEM — R29.818 TRANSIENT NEUROLOGICAL SYMPTOMS: Status: ACTIVE | Noted: 2025-08-11

## 2025-08-11 LAB
ALBUMIN SERPL BCP-MCNC: 4.5 G/DL (ref 3.5–5.2)
ALP SERPL-CCNC: 51 UNIT/L (ref 40–150)
ALT SERPL W/O P-5'-P-CCNC: 21 UNIT/L (ref 0–55)
ANION GAP (OHS): 9 MMOL/L (ref 8–16)
AST SERPL-CCNC: 20 UNIT/L (ref 0–50)
BILIRUB SERPL-MCNC: 2.7 MG/DL (ref 0.1–1)
BUN SERPL-MCNC: 16 MG/DL (ref 6–20)
CALCIUM SERPL-MCNC: 9.1 MG/DL (ref 8.7–10.5)
CHLORIDE SERPL-SCNC: 104 MMOL/L (ref 95–110)
CO2 SERPL-SCNC: 25 MMOL/L (ref 23–29)
CREAT SERPL-MCNC: 1 MG/DL (ref 0.5–1.4)
GFR SERPLBLD CREATININE-BSD FMLA CKD-EPI: >60 ML/MIN/1.73/M2
GLUCOSE SERPL-MCNC: 89 MG/DL (ref 70–110)
HCT VFR BLD AUTO: 44.7 % (ref 40–54)
HGB BLD-MCNC: 14.7 GM/DL (ref 14–18)
POTASSIUM SERPL-SCNC: 3.6 MMOL/L (ref 3.5–5.1)
PROT SERPL-MCNC: 7.7 GM/DL (ref 6–8.4)
PSA SERPL-MCNC: 0.25 NG/ML
SODIUM SERPL-SCNC: 138 MMOL/L (ref 136–145)

## 2025-08-11 PROCEDURE — 85018 HEMOGLOBIN: CPT

## 2025-08-11 PROCEDURE — 84153 ASSAY OF PSA TOTAL: CPT

## 2025-08-11 PROCEDURE — 84403 ASSAY OF TOTAL TESTOSTERONE: CPT

## 2025-08-11 PROCEDURE — 85014 HEMATOCRIT: CPT

## 2025-08-11 PROCEDURE — 82672 ASSAY OF ESTROGEN: CPT

## 2025-08-11 PROCEDURE — 80053 COMPREHEN METABOLIC PANEL: CPT

## 2025-08-11 PROCEDURE — 36415 COLL VENOUS BLD VENIPUNCTURE: CPT | Mod: PO

## 2025-08-12 LAB — TESTOST SERPL-MCNC: 1136 NG/DL (ref 304–1227)

## 2025-08-15 LAB
ESTRADIOL: 32 PG/ML
ESTROGEN SERPL-MCNC: 78 PG/ML
ESTRONE PG/ML: 46 PG/ML

## 2025-08-18 DIAGNOSIS — E34.8 EXCESS ESTROGEN IN MALE: ICD-10-CM

## 2025-08-18 DIAGNOSIS — E29.1 MALE HYPOGONADISM: Primary | ICD-10-CM

## 2025-08-18 RX ORDER — ANASTROZOLE 1 MG/1
1 TABLET ORAL WEEKLY
Qty: 12 TABLET | Refills: 3 | Status: SHIPPED | OUTPATIENT
Start: 2025-08-18

## 2025-08-22 ENCOUNTER — OFFICE VISIT (OUTPATIENT)
Dept: NEUROLOGY | Facility: CLINIC | Age: 55
End: 2025-08-22
Payer: COMMERCIAL

## 2025-08-22 VITALS
WEIGHT: 248 LBS | SYSTOLIC BLOOD PRESSURE: 130 MMHG | HEIGHT: 74 IN | HEART RATE: 87 BPM | DIASTOLIC BLOOD PRESSURE: 83 MMHG | BODY MASS INDEX: 31.83 KG/M2

## 2025-08-22 DIAGNOSIS — G56.00 CARPAL TUNNEL SYNDROME, UNSPECIFIED LATERALITY: ICD-10-CM

## 2025-08-22 DIAGNOSIS — G40.209 PARTIAL SYMPTOMATIC EPILEPSY WITH COMPLEX PARTIAL SEIZURES, NOT INTRACTABLE, WITHOUT STATUS EPILEPTICUS: ICD-10-CM

## 2025-08-22 DIAGNOSIS — R29.818 TRANSIENT NEUROLOGICAL SYMPTOMS: ICD-10-CM

## 2025-08-22 DIAGNOSIS — R56.9 SEIZURE IN CHILDHOOD: Primary | ICD-10-CM

## 2025-08-22 PROCEDURE — 99999 PR PBB SHADOW E&M-EST. PATIENT-LVL III: CPT | Mod: PBBFAC,,, | Performed by: PSYCHIATRY & NEUROLOGY

## 2025-08-24 ENCOUNTER — PATIENT MESSAGE (OUTPATIENT)
Dept: NEUROLOGY | Facility: CLINIC | Age: 55
End: 2025-08-24
Payer: COMMERCIAL